# Patient Record
Sex: FEMALE | Race: WHITE | NOT HISPANIC OR LATINO | Employment: OTHER | URBAN - METROPOLITAN AREA
[De-identification: names, ages, dates, MRNs, and addresses within clinical notes are randomized per-mention and may not be internally consistent; named-entity substitution may affect disease eponyms.]

---

## 2017-08-08 ENCOUNTER — CARE COORDINATION (OUTPATIENT)
Dept: GASTROENTEROLOGY | Facility: CLINIC | Age: 47
End: 2017-08-08

## 2017-08-08 DIAGNOSIS — K63.8219 SMALL INTESTINAL BACTERIAL OVERGROWTH: Primary | ICD-10-CM

## 2017-08-08 RX ORDER — NEOMYCIN SULFATE 500 MG/1
500 TABLET ORAL 2 TIMES DAILY
Qty: 28 TABLET | Refills: 0 | Status: SHIPPED | OUTPATIENT
Start: 2017-08-08 | End: 2017-08-22

## 2017-08-15 ENCOUNTER — TELEPHONE (OUTPATIENT)
Dept: GASTROENTEROLOGY | Facility: CLINIC | Age: 47
End: 2017-08-15

## 2017-08-15 NOTE — TELEPHONE ENCOUNTER
Prior Authorization Specialty Medication Request    Medication/Dose: xifaxan 550mg TID for 14 days.  Diagnosis and ICD: SIBO   New/Renewal/Insurance Change PA: New     Important Lab Values:     Previously Tried and Failed Therapies:  Diagnosed with SIBO on elevated hydrogen and methane breath test.  She did not respond to Cipro and Flagyl, but had improvement on rifaximin twice.     Rationale:     Would you like to include any research articles?    If yes please include the hyperlink(s) below or fax @ [phone].    (Include Name and MRN)    If you received a fax notification from an outside Pharmacy;

## 2017-08-15 NOTE — TELEPHONE ENCOUNTER
PA Initiation    Medication: xifaxan 550mg TID for 14 days.  Insurance Company: [employer] - Phone [phone] Fax [phone]  Pharmacy Filling the Rx: [facility] # [id number] - [facility] [address] [place] [address]  Filling Pharmacy Phone: [phone]  Filling Pharmacy Fax: [phone]  Start Date: 8/8/2017

## 2017-08-18 NOTE — TELEPHONE ENCOUNTER
Prior Authorization Approval    Authorization Effective Date: 8/15/2017  Authorization Expiration Date: 8/29/2017  Medication: xifaxan 550mg-APPROVED  Approved Dose/Quantity:    Reference #: [id number]   Insurance Company: [employer] - Phone [phone] Fax [phone]  Expected CoPay: $455     CoPay Card Available:      Foundation Assistance Needed:    Which Pharmacy is filling the prescription (Not needed for infusion/clinic administered): [facility] # [id number] - [place] [address] [facility]  Pharmacy Notified: Yes  Patient Notified: Yes    PATIENT IS AWARE OF HIGH COPAY.  UNABLE TO ADJUST COPAY DUE TO MEMBER'S INSURANCE PLAN.

## 2017-10-18 ENCOUNTER — RECORDS - HEALTHEAST (OUTPATIENT)
Dept: ADMINISTRATIVE | Facility: OTHER | Age: 47
End: 2017-10-18

## 2017-10-22 ENCOUNTER — HEALTH MAINTENANCE LETTER (OUTPATIENT)
Age: 47
End: 2017-10-22

## 2017-10-30 ENCOUNTER — OFFICE VISIT - HEALTHEAST (OUTPATIENT)
Dept: FAMILY MEDICINE | Facility: CLINIC | Age: 47
End: 2017-10-30

## 2017-10-30 DIAGNOSIS — Z13.228 ENCOUNTER FOR SCREENING FOR OTHER METABOLIC DISORDERS: ICD-10-CM

## 2017-10-30 DIAGNOSIS — M79.7 FIBROMYALGIA: ICD-10-CM

## 2017-10-30 DIAGNOSIS — K51.20 ULCERATIVE (CHRONIC) PROCTITIS (H): ICD-10-CM

## 2017-10-30 DIAGNOSIS — Z00.00 ROUTINE GENERAL MEDICAL EXAMINATION AT A HEALTH CARE FACILITY: ICD-10-CM

## 2017-10-30 DIAGNOSIS — E55.9 VITAMIN D DEFICIENCY: ICD-10-CM

## 2017-10-30 DIAGNOSIS — Z12.31 VISIT FOR SCREENING MAMMOGRAM: ICD-10-CM

## 2017-10-30 DIAGNOSIS — E03.9 HYPOTHYROIDISM: ICD-10-CM

## 2017-10-30 DIAGNOSIS — G93.32 CHRONIC FATIGUE SYNDROME: ICD-10-CM

## 2017-10-30 LAB
CHOLEST SERPL-MCNC: 208 MG/DL
FASTING STATUS PATIENT QL REPORTED: YES
HDLC SERPL-MCNC: 65 MG/DL
LDLC SERPL CALC-MCNC: 133 MG/DL
TRIGL SERPL-MCNC: 48 MG/DL

## 2017-11-09 ENCOUNTER — COMMUNICATION - HEALTHEAST (OUTPATIENT)
Dept: FAMILY MEDICINE | Facility: CLINIC | Age: 47
End: 2017-11-09

## 2017-11-09 ASSESSMENT — ENCOUNTER SYMPTOMS
STIFFNESS: 0
DIZZINESS: 0
BLOOD IN STOOL: 0
WEAKNESS: 0
FEVER: 0
ABDOMINAL PAIN: 1
MUSCLE WEAKNESS: 1
POLYPHAGIA: 0
BRUISES/BLEEDS EASILY: 1
DECREASED APPETITE: 0
CONSTIPATION: 1
NIGHT SWEATS: 1
ALTERED TEMPERATURE REGULATION: 1
PANIC: 0
DIARRHEA: 0
HEADACHES: 0
BOWEL INCONTINENCE: 0
CHILLS: 0
NAUSEA: 0
JOINT SWELLING: 0
FATIGUE: 1
PARALYSIS: 0
INSOMNIA: 0
MEMORY LOSS: 0
SPEECH CHANGE: 0
INCREASED ENERGY: 0
ARTHRALGIAS: 0
WEIGHT LOSS: 0
BACK PAIN: 0
SWOLLEN GLANDS: 0
NUMBNESS: 0
TINGLING: 0
SEIZURES: 0
NECK PAIN: 1
DECREASED CONCENTRATION: 1
NERVOUS/ANXIOUS: 0
RECTAL PAIN: 0
JAUNDICE: 0
HEARTBURN: 0
LOSS OF CONSCIOUSNESS: 0
BLOATING: 1
MUSCLE CRAMPS: 0
VOMITING: 0
POLYDIPSIA: 0
DISTURBANCES IN COORDINATION: 0
DEPRESSION: 1
HALLUCINATIONS: 0
MYALGIAS: 1
WEIGHT GAIN: 1

## 2017-11-13 ENCOUNTER — TELEPHONE (OUTPATIENT)
Dept: GASTROENTEROLOGY | Facility: CLINIC | Age: 47
End: 2017-11-13

## 2017-11-14 ENCOUNTER — OFFICE VISIT (OUTPATIENT)
Dept: GASTROENTEROLOGY | Facility: CLINIC | Age: 47
End: 2017-11-14

## 2017-11-14 ENCOUNTER — RECORDS - HEALTHEAST (OUTPATIENT)
Dept: ADMINISTRATIVE | Facility: OTHER | Age: 47
End: 2017-11-14

## 2017-11-14 VITALS
HEART RATE: 74 BPM | TEMPERATURE: 97.9 F | OXYGEN SATURATION: 100 % | SYSTOLIC BLOOD PRESSURE: 110 MMHG | HEIGHT: 70 IN | DIASTOLIC BLOOD PRESSURE: 74 MMHG | BODY MASS INDEX: 22.33 KG/M2 | WEIGHT: 156 LBS

## 2017-11-14 DIAGNOSIS — K59.00 CONSTIPATION, UNSPECIFIED CONSTIPATION TYPE: ICD-10-CM

## 2017-11-14 DIAGNOSIS — M62.89 PELVIC FLOOR DYSFUNCTION: ICD-10-CM

## 2017-11-14 DIAGNOSIS — K63.8219 SMALL INTESTINAL BACTERIAL OVERGROWTH: Primary | ICD-10-CM

## 2017-11-14 DIAGNOSIS — K51.20 ULCERATIVE PROCTITIS WITHOUT COMPLICATION (H): ICD-10-CM

## 2017-11-14 ASSESSMENT — PAIN SCALES - GENERAL: PAINLEVEL: NO PAIN (0)

## 2017-11-14 NOTE — NURSING NOTE
Printed after visit summary given to along with verbal instructions.  Called pelvic floor and left a message requesting pt be called for an appt for biofeedback.  Follow up appt given.  Hydrogen Breath test scheduled.

## 2017-11-14 NOTE — NURSING NOTE
"Chief Complaint   Patient presents with     RECHECK     F/U       Vitals:    11/14/17 0915   BP: 110/74   BP Location: Left arm   Patient Position: Chair   Cuff Size: Adult Regular   Pulse: 74   Temp: 97.9  F (36.6  C)   SpO2: 100%   Weight: 156 lb   Height: 5' 10\"       Body mass index is 22.38 kg/(m^2).      Charlene Collazo                          "

## 2017-11-14 NOTE — MR AVS SNAPSHOT
After Visit Summary   11/14/2017    Doris Fritz    MRN: [id number]           Patient Information     Date Of Birth          1970        Visit Information        Provider Department      11/14/2017 9:20 AM Denzel Hall MD [facility]        Today's Diagnoses     Small intestinal bacterial overgrowth    -  1    Ulcerative proctitis without complication (H)        Constipation, unspecified constipation type        Pelvic floor dysfunction          Care Instructions    Good to see you again!    Continue the canasa suppositories as needed    Referral to the pelvic floor center for biofeedback/physicial therapy for your known pelvic floor center    Repeat the hydrogen breath test  You are scheduled December 22  Check in time  845   [facility]   Endoscopy Unit [address]   [address]  You will be mailed the instructions   [phone]     Follow up with your new naturopath and nutritionist    Exercise at least 5 times for 30 min per time    Keep hydrated    Follow up in 6 month or sooner if needed         Thanks Kaci Painting RN Care Coordinator for Dr. Hall   Phone   [phone]      Call with questions  For questions regarding your care Monday through Friday, contact the RN GI care coordinator,  Call   [phone] option 3 . Your call will be  returned same day, or if consultation is needed with the provider, it may be following business day - or you may send a My Chart message.    For medication refills (prescribed by the GI clinic), contact your pharmacy.    For appointment rescheduling/cancellation, contact [phone]     After hours, or if you have an immediate GI concern and cannot wait for a return call, contact the GI Fellow at [phone] and select option #4.              Follow-ups after your visit        Additional Services     GASTROENTEROLOGY ADULT REF PROCEDURE ONLY       Last Lab Result: Creatinine (mg/dL)       Date                      Value                 02/08/2016               0.85             ----------  Body mass index is 22.38 kg/(m^2).     Needed:  No  Language:  English    Patient will be contacted to schedule procedure.     Please be aware that coverage of these services is subject to the terms and limitations of your health insurance plan.  Call member services at your health plan with any benefit or coverage questions.  Any procedures must be performed at a [place] facility OR coordinated by your clinic's referral office.    Please bring the following with you to your appointment:    (1) Any X-Rays, CTs or MRIs which have been performed.  Contact the facility where they were done to arrange for  prior to your scheduled appointment.    (2) List of current medications   (3) This referral request   (4) Any documents/labs given to you for this referral                  Your next 10 appointments already scheduled     Dec 22, 2017   Procedure with Karen Chan MD   [facility], [place], Endoscopy ([facility], [facility])    [address] [id number] [phone]           The [facility] is located on the corner of [address] and [address] on the [facility]. It is easily accessible from virtually any point in the [facility] [place] [place] [facility] area, via [address] and [address].            Apr 10, 2018  9:20 AM CDT   (Arrive by 9:05 AM)   RETURN INFLAMMATORY BOWEL DISEASE with Denzel Hall MD   [facility] ([facility])    [address]  [address] [facility] [id number] [phone]              Future tests that were ordered for you today     Open Future Orders        Priority Expected Expires Ordered    BREATH HYDROGEN TEST Routine  12/29/2017 11/14/2017            Who to contact     Please call your clinic at [phone] to:    Ask questions about your  "health    Make or cancel appointments    Discuss your medicines    Learn about your test results    Speak to your doctor   If you have compliments or concerns about an experience at your clinic, or if you wish to file a complaint, please contact [facility] Patient Relations at [phone] or email us at [email or web address]         Additional Information About Your Visit        [email or web address] Information     [employer] gives you secure access to your electronic health record. If you see a primary care provider, you can also send messages to your care team and make appointments. If you have questions, please call your primary care clinic.  If you do not have a primary care provider, please call [phone] and they will assist you.      [employer] is an electronic gateway that provides easy, online access to your medical records. With [employer], you can request a clinic appointment, read your test results, renew a prescription or communicate with your care team.     To access your existing account, please contact your [facility] or call [phone] for assistance.        Care EveryWhere ID     This is your Care EveryWhere ID. This could be used by other organizations to access your [facility] medical records  [id number]        Your Vitals Were     Pulse Temperature Height Pulse Oximetry BMI (Body Mass Index)       74 97.9  F (36.6  C) 1.778 m (5' 10\") 100% 22.38 kg/m2        Blood Pressure from Last 3 Encounters:   11/14/17 110/74   05/16/16 115/62   02/08/16 126/80    Weight from Last 3 Encounters:   11/14/17 70.8 kg (156 lb)   05/16/16 71 kg (156 lb 8 oz)   02/08/16 71.7 kg (158 lb 1.6 oz)              We Performed the Following     CBC with platelets differential     Comprehensive metabolic panel     CRP inflammation     Erythrocyte sedimentation rate auto     GASTROENTEROLOGY ADULT REF PROCEDURE ONLY          Today's Medication Changes          These " changes are accurate as of: 11/14/17 10:19 AM.  If you have any questions, ask your nurse or doctor.               Stop taking these medicines if you haven't already. Please contact your care team if you have questions.     5-HTP 50 MG Caps           ALPHA LIPOIC ACID PO           B COMPLEX PO           CO Q 10 PO           GARLIC PO           IMMUNOPRO RX Powd           L-Glutamic Acid Powd           MAGNESIUM CITRATE PO           OMEGA-3 FISH OIL PO           OREGANO PO           PROBIOTIC DAILY PO           PROBIOTIC MULTI-ENZYME Tabs           UNABLE TO FIND           VITAMIN D-3 PO                    Primary Care Provider Office Phone # Fax #    Kathleen Salazar [phone]       [place] [address]        Equal Access to Services     MARCY CLOUD : Hadii joe Hawk, waaxda sharon, qaybta kaalmada kia, abhishek jerez . So [facility] [phone].    ATENCIÓN: Si habla español, tiene a aguilera disposición servicios gratuitos de asistencia lingüística. Llame al [phone].    We comply with applicable federal civil rights laws and [place] laws. We do not discriminate on the basis of race, color, national origin, age, disability, sex, sexual orientation, or gender identity.            Thank you!     Thank you for choosing [facility]  for your care. Our goal is always to provide you with excellent care. Hearing back from our patients is one way we can continue to improve our services. Please take a few minutes to complete the written survey that you may receive in the mail after your visit with us. Thank you!             Your Updated Medication List - Protect others around you: Learn how to safely use, store and throw away your medicines at www.disposemymeds.org.          This list is accurate as of: 11/14/17 10:19 AM.  Always use your most recent med list.                   Brand Name  Dispense Instructions for use Diagnosis    Enema Bottle Misc     1 Device    Place 1 Device rectally as needed 1 - 2 tap water enemas morning of precedure    Ulcerative (chronic) proctitis, without complications (H)       HYDROcodone-acetaminophen 5-500 MG per tablet    VICODIN     Take 1-2 tablets by mouth every 6 hours as needed for moderate to severe pain        magnesium 250 MG tablet      Take 1 tablet by mouth daily        mesalamine 1000 MG Suppository    CANASA    90 suppository    Place 1 suppository (1,000 mg) rectally 2 times daily Place 1 suppository rectally  2 times a day for 2 weeks then 1 rectally at bedtime    Ulcerative colitis (H)       PROZAC PO      Take 20 mg by mouth daily        TRAZODONE HCL PO      Take 50 mg by mouth At Bedtime

## 2017-11-14 NOTE — LETTER
"11/14/2017       RE: Doris Fritz  [address]  [place] [address]     Dear Colleague,    Thank you for referring your patient, Doris Fritz, to the [facility] at [facility]. Please see a copy of my visit note below.    OUTPATIENT GI FOLLOWUP VISIT        PRIMARY CARE PHYSICIAN:  Dr. Kathleen Salazar.       CHIEF COMPLAINT:  Ulcerative proctitis and abdominal bloating/constipation.       ULCERATIVE COLITIS HISTORY:   1.  Age at diagnosis:  Mid 30s.  Diagnosed by Dr. Taylor in the early 2000s.   2.  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  Most recent flex sig at the end of 2015.  Biopsies of the descending, transverse and sigmoid colon only showed melanosis coli.  Rectal biopsies showed chronic active colitis.   3.  Previous ulcerative colitis surgeries:  None.   4.  Current ulcerative colitis medications:  None.     5.  Previous ulcerative colitis medications:  She has taken Canasa suppositories intermittently only.       HISTORY OF PRESENT ILLNESS:  Ms. Fritz is here for followup today.      She says she has had a rough year.    Has been taking the canasa supp intermittently (about quarterly) if she has any mucous. This controls that well.    Her biggest complaint is constipation. Takes magnesium and PRN enemas but still has issues. If she eats a liquid diet this is controlled but cannot expand her diet.    She has not follow up with the pelvic floor center.    She has not taken rifaximin for SIBO in quite some time. She does feel bloated but this is usually related to when she is constipated.    She has an appt with a new naturopath who she says treats SIBO \"naturally\" with diet.     She has not really tolerated miralax (she likes magnesium better) and does not tolerate fiber. She has tried various diets including low FODMAP and modified paleo in the past.      REVIEW OF SYSTEMS:  A " complete review of systems was performed.  Pertinent positives and negatives are as stated above in the HPI.  The remainder of a complete review of systems is unremarkable.       ADDITIONAL PAST MEDICAL HISTORY:   1.  Fibromyalgia.   2.  Chronic Lyme.  It is not clear to me how this was diagnosed.  She follows with an alternative medicine doctor.  She takes herbal antimicrobials for this.  I have cautioned her multiple times about taking herbal medications.   3.  Diagnosed with SIBO on elevated hydrogen and methane breath test.  She did not respond to Cipro and Flagyl, but had improvement on rifaximin twice.   4.  Endometriosis.  Reports this was diagnosed by laparoscopy in the 1990s.  She has not had any treatment for this.  She does have some worsening symptoms while menstruating; however, she has not had recent issues with this.  Please see my initial consult note for further details of this.   5.  History of depression.   6.  Chronic pain.  This is associated with her chronic fatigue and fibromyalgia.   7.  Cognitive impairment, details are unclear.   8.  Recurrent UTIs.  She previously was on recurrent antibiotics for this.       FAMILY HISTORY:  This was reviewed.  No history of IBD, liver disease or pancreas disease.  No family history of colon cancer or colon polyps.       SOCIAL HISTORY:   Reviewed. She is currently on disability.  She is not , but does have a fiancee.  She has never had any children.  She is waiting to get  until she figures out her GI symptoms.  She is not sexually active; she does have a low libido.  She does not smoke.  She does not use marijuana.  She formerly had a history of drinking too much alcohol, but now drinks only rarely.  No changes in symptoms with alcohol.       MEDICATIONS:   Current Outpatient Prescriptions   Medication     HYDROcodone-acetaminophen (VICODIN) 5-500 MG per tablet     FLUoxetine HCl (PROZAC PO)     mesalamine (CANASA) 1000 MG suppository      magnesium 250 MG tablet     TRAZODONE HCL PO     [employer] (ENEMA BOTTLE) MISC     No current facility-administered medications for this visit.          PHYSICAL EXAMINATION:   VITAL SIGNS:  156 pounds.  Blood pressure 115/62, pulse 81, temperature 98.2, BMI 22, satting 99% on room air.   GENERAL:  She is awake, alert and oriented x3, with no focal deficits.  Pleasant and interactive.   HEENT:  Head is atraumatic and normocephalic.  Sclerae are without injection.  Oropharynx is clear.   ABDOMEN:  Soft, nontender and nondistended, positive bowel sounds.       LABORATORY DATA:   Reviewed in EPIC from 2016      ASSESSMENT AND PLAN:   1.  Ulcerative proctitis.  This has been most recently seen on a flex sig at the end of 2015.   Controlled on intermittent canasa.  2.  Constipation.  I think this is a combination of her ulcerative proctitis, as well as her pelvic floor dysfunction.   UC proctitis is controlled. She has not been able to manage with enemas or magnesium. Can try miralax but she declines. I think she will benefit from pelvic floor biofeedback and PT. I think her constipation is driving much of her bloating and symptoms. I think she will benefit from this. If no improvement we can try Linzess or Amitiza but the patient prefers to avoid medications which is reasonable.   3.  IBS and small intestinal bacterial overgrowth.  Again I think much of her symptoms could be related to constipation. Manage as above. We will recheck hydrogen breath test. She will follow up with a naturopath and nutritionist.  4. Depression. We discussed this as well, including the its impact on symptoms. She will meet with Dr. Jernigan.  5.  Health care maintenance.  I did discuss this with the patient.  I do recommend a yearly flu shot; however, she does not like flu shots, but she will think about this.  I also recommend a pneumonia vaccine, and a 5-year booster if this has not been done.  She should be up-to-date on her health  maintenance vaccines, including Tdap.  She is okay to get live vaccines if needed.  She should take calcium and vitamin D on a daily basis.  She has never had steroids, so I do not think she needs a DEXA scan.  She should avoid NSAIDs and tobacco.  She does not need yearly skin exams, as she is not immunosuppressed.     Follow up in 6 months or sooner if needed      Thank you very much for the opportunity to take part in the care of this patient.  Please do not hesitate to call with questions.     Denzel Hall MD    [facility]  Division of Gastroenterology, Hepatology and Nutrition

## 2017-11-14 NOTE — PROGRESS NOTES
"OUTPATIENT GI FOLLOWUP VISIT        PRIMARY CARE PHYSICIAN:  Dr. Kathleen Salazar.       CHIEF COMPLAINT:  Ulcerative proctitis and abdominal bloating/constipation.       ULCERATIVE COLITIS HISTORY:   1.  Age at diagnosis:  Mid 30s.  Diagnosed by Dr. Taylor in the early 2000s.   2.  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  Most recent flex sig at the end of 2015.  Biopsies of the descending, transverse and sigmoid colon only showed melanosis coli.  Rectal biopsies showed chronic active colitis.   3.  Previous ulcerative colitis surgeries:  None.   4.  Current ulcerative colitis medications:  None.     5.  Previous ulcerative colitis medications:  She has taken Canasa suppositories intermittently only.       HISTORY OF PRESENT ILLNESS:  Ms. Fritz is here for followup today.      She says she has had a rough year.    Has been taking the canasa supp intermittently (about quarterly) if she has any mucous. This controls that well.    Her biggest complaint is constipation. Takes magnesium and PRN enemas but still has issues. If she eats a liquid diet this is controlled but cannot expand her diet.    She has not follow up with the pelvic floor center.    She has not taken rifaximin for SIBO in quite some time. She does feel bloated but this is usually related to when she is constipated.    She has an appt with a new naturopath who she says treats SIBO \"naturally\" with diet.     She has not really tolerated miralax (she likes magnesium better) and does not tolerate fiber. She has tried various diets including low FODMAP and modified paleo in the past.      REVIEW OF SYSTEMS:  A complete review of systems was performed.  Pertinent positives and negatives are as stated above in the HPI.  The remainder of a complete review of systems is unremarkable.       ADDITIONAL PAST MEDICAL HISTORY:   1.  Fibromyalgia.   2.  Chronic Lyme.  It is not clear to me how this was diagnosed.  She follows " with an alternative medicine doctor.  She takes herbal antimicrobials for this.  I have cautioned her multiple times about taking herbal medications.   3.  Diagnosed with SIBO on elevated hydrogen and methane breath test.  She did not respond to Cipro and Flagyl, but had improvement on rifaximin twice.   4.  Endometriosis.  Reports this was diagnosed by laparoscopy in the 1990s.  She has not had any treatment for this.  She does have some worsening symptoms while menstruating; however, she has not had recent issues with this.  Please see my initial consult note for further details of this.   5.  History of depression.   6.  Chronic pain.  This is associated with her chronic fatigue and fibromyalgia.   7.  Cognitive impairment, details are unclear.   8.  Recurrent UTIs.  She previously was on recurrent antibiotics for this.       FAMILY HISTORY:  This was reviewed.  No history of IBD, liver disease or pancreas disease.  No family history of colon cancer or colon polyps.       SOCIAL HISTORY:  Reviewed. She is currently on disability.  She is not , but does have a fiancee.  She has never had any children.  She is waiting to get  until she figures out her GI symptoms.  She is not sexually active; she does have a low libido.  She does not smoke.  She does not use marijuana.  She formerly had a history of drinking too much alcohol, but now drinks only rarely.  No changes in symptoms with alcohol.       MEDICATIONS:   Current Outpatient Prescriptions   Medication     HYDROcodone-acetaminophen (VICODIN) 5-500 MG per tablet     FLUoxetine HCl (PROZAC PO)     mesalamine (CANASA) 1000 MG suppository     magnesium 250 MG tablet     TRAZODONE HCL PO     [employer] (ENEMA BOTTLE) MISC     No current facility-administered medications for this visit.          PHYSICAL EXAMINATION:   VITAL SIGNS:  156 pounds.  Blood pressure 115/62, pulse 81, temperature 98.2, BMI 22, satting 99% on room air.   GENERAL:  She is  awake, alert and oriented x3, with no focal deficits.  Pleasant and interactive.   HEENT:  Head is atraumatic and normocephalic.  Sclerae are without injection.  Oropharynx is clear.   ABDOMEN:  Soft, nontender and nondistended, positive bowel sounds.       LABORATORY DATA:  Reviewed in EPIC from 2016      ASSESSMENT AND PLAN:   1.  Ulcerative proctitis.  This has been most recently seen on a flex sig at the end of 2015.  Controlled on intermittent canasa.  2.  Constipation.  I think this is a combination of her ulcerative proctitis, as well as her pelvic floor dysfunction.  UC proctitis is controlled. She has not been able to manage with enemas or magnesium. Can try miralax but she declines. I think she will benefit from pelvic floor biofeedback and PT. I think her constipation is driving much of her bloating and symptoms. I think she will benefit from this. If no improvement we can try Linzess or Amitiza but the patient prefers to avoid medications which is reasonable.   3.  IBS and small intestinal bacterial overgrowth.  Again I think much of her symptoms could be related to constipation. Manage as above. We will recheck hydrogen breath test. She will follow up with a naturopath and nutritionist.  4. Depression. We discussed this as well, including the its impact on symptoms. She will meet with Dr. Jernigan.  5.  Health care maintenance.  I did discuss this with the patient.  I do recommend a yearly flu shot; however, she does not like flu shots, but she will think about this.  I also recommend a pneumonia vaccine, and a 5-year booster if this has not been done.  She should be up-to-date on her health maintenance vaccines, including Tdap.  She is okay to get live vaccines if needed.  She should take calcium and vitamin D on a daily basis.  She has never had steroids, so I do not think she needs a DEXA scan.  She should avoid NSAIDs and tobacco.  She does not need yearly skin exams, as she is not immunosuppressed.      Follow up in 6 months or sooner if needed      Thank you very much for the opportunity to take part in the care of this patient.  Please do not hesitate to call with questions.     Denzel Hall MD    [facility]  Division of Gastroenterology, Hepatology and Nutrition    Answers for HPI/ROS submitted by the patient on 11/9/2017   General Symptoms: Yes  Skin Symptoms: No  HENT Symptoms: No  EYE SYMPTOMS: No  HEART SYMPTOMS: No  LUNG SYMPTOMS: No  INTESTINAL SYMPTOMS: Yes  URINARY SYMPTOMS: No  GYNECOLOGIC SYMPTOMS: No  BREAST SYMPTOMS: No  SKELETAL SYMPTOMS: Yes  BLOOD SYMPTOMS: Yes  NERVOUS SYSTEM SYMPTOMS: Yes  MENTAL HEALTH SYMPTOMS: Yes  Fever: No  Loss of appetite: No  Weight loss: No  Weight gain: Yes  Fatigue: Yes  Night sweats: Yes  Chills: No  Increased stress: No  Excessive hunger: No  Excessive thirst: No  Feeling hot or cold when others believe the temperature is normal: Yes  Loss of height: No  Post-operative complications: No  Surgical site pain: No  Hallucinations: No  Change in or Loss of Energy: No  Hyperactivity: No  Confusion: No  Heart burn or indigestion: No  Nausea: No  Vomiting: No  Abdominal pain: Yes  Bloating: Yes  Constipation: Yes  Diarrhea: No  Blood in stool: No  Black stools: No  Rectal or Anal pain: No  Fecal incontinence: No  Yellowing of skin or eyes: No  Vomit with blood: No  Change in stools: No  Back pain: No  Muscle aches: Yes  Neck pain: Yes  Swollen joints: No  Joint pain: No  Bone pain: No  Muscle cramps: No  Muscle weakness: Yes  Joint stiffness: No  Bone fracture: No  Anemia: No  Swollen glands: No  Easy bleeding or bruising: Yes  Edema or swelling: No  Trouble with coordination: No  Dizziness or trouble with balance: No  Fainting or black-out spells: No  Memory loss: No  Headache: No  Seizures: No  Speech problems: No  Tingling: No  Weakness: No  Difficulty walking: No  Paralysis: No  Numbness: No  Nervous or Anxious: No  Depression:  Yes  Trouble sleeping: No  Trouble thinking or concentrating: Yes  Mood changes: No  Panic attacks: No

## 2017-11-14 NOTE — PATIENT INSTRUCTIONS
Good to see you again!    Continue the canasa suppositories as needed    Referral to the pelvic floor center for biofeedback/physicial therapy for your known pelvic floor center    Repeat the hydrogen breath test  You are scheduled December 22  Check in time  845   [facility]   Endoscopy Unit [facility]   [address]  You will be mailed the instructions   [phone]     Follow up with your new naturopath and nutritionist    Exercise at least 5 times for 30 min per time    Keep hydrated    Follow up in 6 month or sooner if needed         Thanks Kaci Painting RN Care Coordinator for Dr. Hall   Phone   [phone]      Call with questions  For questions regarding your care Monday through Friday, contact the RN GI care coordinator,  Call   [phone] option 3 . Your call will be  returned same day, or if consultation is needed with the provider, it may be following business day - or you may send a My Chart message.    For medication refills (prescribed by the GI clinic), contact your pharmacy.    For appointment rescheduling/cancellation, contact [phone]     After hours, or if you have an immediate GI concern and cannot wait for a return call, contact the GI Fellow at [phone] and select option #4.

## 2017-11-22 ENCOUNTER — CARE COORDINATION (OUTPATIENT)
Dept: GASTROENTEROLOGY | Facility: CLINIC | Age: 47
End: 2017-11-22

## 2017-11-22 DIAGNOSIS — K51.90 ULCERATIVE COLITIS (H): ICD-10-CM

## 2017-11-22 RX ORDER — MESALAMINE 1000 MG/1
SUPPOSITORY RECTAL
Qty: 90 SUPPOSITORY | Refills: 3 | Status: SHIPPED | OUTPATIENT
Start: 2017-11-22 | End: 2021-11-01

## 2017-12-22 ENCOUNTER — HOSPITAL ENCOUNTER (OUTPATIENT)
Facility: CLINIC | Age: 47
Discharge: HOME | End: 2017-12-22
Attending: INTERNAL MEDICINE | Admitting: INTERNAL MEDICINE
Payer: MEDICARE

## 2017-12-22 ENCOUNTER — SURGERY (OUTPATIENT)
Age: 47
End: 2017-12-22

## 2017-12-22 VITALS — HEIGHT: 70 IN | BODY MASS INDEX: 22.62 KG/M2 | WEIGHT: 158 LBS

## 2017-12-22 PROCEDURE — 91065 BREATH HYDROGEN/METHANE TEST: CPT | Performed by: INTERNAL MEDICINE

## 2017-12-22 NOTE — IP AVS SNAPSHOT
[facility], [place], Endoscopy    [address] [place] [address]    Phone:  [phone]                                       After Visit Summary   12/22/2017    Doris Fritz    MRN: [id number]           After Visit Summary Signature Page     I have received my discharge instructions, and my questions have been answered. I have discussed any challenges I see with this plan with the nurse or doctor.    ..........................................................................................................................................  Patient/Patient Representative Signature      ..........................................................................................................................................  Patient Representative Print Name and Relationship to Patient    ..................................................               ................................................  Date                                            Time    ..........................................................................................................................................  Reviewed by Signature/Title    ...................................................              ..............................................  Date                                                            Time

## 2017-12-22 NOTE — IP AVS SNAPSHOT
MRN:[id number]                      After Visit Summary   12/22/2017    Doris Fritz    MRN: [id number]           Thank you!     Thank you for choosing [facility] for your care. Our goal is always to provide you with excellent care. Hearing back from our patients is one way we can continue to improve our services. Please take a few minutes to complete the written survey that you may receive in the mail after you visit with us. Thank you!        Patient Information     Date Of Birth          1970        About your hospital stay     You were admitted on:  December 22, 2017 You last received care in the:  [facility], Endoscopy    You were discharged on:  December 22, 2017       Who to Call     For medical emergencies, please call [phone].  For non-urgent questions about your medical care, please call your primary care provider or clinic, [phone]  For questions related to your surgery, please call your surgery clinic        Attending Provider     Provider Karen Reynolds MD Internal Medicine       Primary Care Provider Office Phone # Fax #    Kathleen Salazar [phone]      Your next 10 appointments already scheduled     Apr 10, 2018  9:20 AM CDT   (Arrive by 9:05 AM)   RETURN INFLAMMATORY BOWEL DISEASE with Denzel Hall MD   [facility] ([facility] [place])    [address] [place] [address] [phone]              Further instructions from your care team       Hydrogen Breath Test Discharge Instructions    1. You may experience diarrhea for the next 12-24 hours.  2. Resume a regular diet.  3. Follow-up with your referring doctor in clinic.  4. If you have questions call [phone] from 7:00am-4:30pm     Radha Blanchard RN    Pending Results     No orders found from 12/20/2017 to 12/23/2017.            Admission Information     Date & Time Provider  "Department Dept. Phone    12/22/2017 Karen Chan MD [facility], [place], Endoscopy [phone]      Your Vitals Were     Height Weight BMI (Body Mass Index)             1.778 m (5' 10\") 71.7 kg (158 lb) 22.67 kg/m2         MyChart Information     [employer] gives you secure access to your electronic health record. If you see a primary care provider, you can also send messages to your care team and make appointments. If you have questions, please call your primary care clinic.  If you do not have a primary care provider, please call [phone] and they will assist you.        Care EveryWhere ID     This is your Care EveryWhere ID. This could be used by other organizations to access your [place] medical records  [id number]        Equal Access to Services     MARCY CLOUD : Danna Hawk, francesca herrera, yariel adam, abhishek simon. So [facility] [phone].    ATENCIÓN: Si habla español, tiene a aguilera disposición servicios gratuitos de asistencia lingüística. Marni al [phone].    We comply with applicable federal civil rights laws and [place] laws. We do not discriminate on the basis of race, color, national origin, age, disability, sex, sexual orientation, or gender identity.               Review of your medicines      UNREVIEWED medicines. Ask your doctor about these medicines        Dose / Directions    HYDROcodone-acetaminophen 5-500 MG per tablet   Commonly known as:  VICODIN        Dose:  1-2 tablet   Take 1-2 tablets by mouth every 6 hours as needed for moderate to severe pain   Refills:  0       magnesium 250 MG tablet   Indication:  take 1-2 tabs nightly        Dose:  1 tablet   Take 1 tablet by mouth daily   Refills:  0       mesalamine 1000 MG Suppository   Commonly known as:  CANASA   Used for:  Ulcerative colitis (H)        Place 1 suppository rectally  at bedtime as needed.   Quantity:  90 suppository   Refills:  3       PROZAC PO "        Dose:  20 mg   Take 20 mg by mouth daily   Refills:  0       TRAZODONE HCL PO   Indication:  take 1-2 tabs as needed        Dose:  50 mg   Take 50 mg by mouth At Bedtime   Refills:  0         CONTINUE these medicines which have NOT CHANGED        Dose / Directions    Enema Bottle Misc   Used for:  Ulcerative (chronic) proctitis, without complications (H)        Dose:  1 Device   Place 1 Device rectally as needed 1 - 2 tap water enemas morning of precedure   Quantity:  1 Device   Refills:  0                Protect others around you: Learn how to safely use, store and throw away your medicines at www.disposemymeds.org.             Medication List: This is a list of all your medications and when to take them. Check marks below indicate your daily home schedule. Keep this list as a reference.      Medications           Morning Afternoon Evening Bedtime As Needed    Enema Bottle Misc   Place 1 Device rectally as needed 1 - 2 tap water enemas morning of precedure                                HYDROcodone-acetaminophen 5-500 MG per tablet   Commonly known as:  VICODIN   Take 1-2 tablets by mouth every 6 hours as needed for moderate to severe pain                                magnesium 250 MG tablet   Take 1 tablet by mouth daily                                mesalamine 1000 MG Suppository   Commonly known as:  CANASA   Place 1 suppository rectally  at bedtime as needed.                                PROZAC PO   Take 20 mg by mouth daily                                TRAZODONE HCL PO   Take 50 mg by mouth At Bedtime

## 2017-12-22 NOTE — OR NURSING
Pt here for HBT. Procedure explained and consent given. Pt tolerated test with complaints of bloating increasing during test. Baseline breath obtained prior to pt drinking 25 gms dxylose. Hydrogen level from 1 ppm at baseline, 2 at 60 min, 3 at 120 min, 2 at 180 min. Methane levels remain 0 ppm throughout test. DC instructions given. Pt will follow up with referring provider for test results.

## 2017-12-22 NOTE — DISCHARGE INSTRUCTIONS
Hydrogen Breath Test Discharge Instructions    1. You may experience diarrhea for the next 12-24 hours.  2. Resume a regular diet.  3. Follow-up with your referring doctor in clinic.  4. If you have questions call [phone] from 7:00am-4:30pm     Radha Blanchard RN

## 2017-12-26 ENCOUNTER — OFFICE VISIT - HEALTHEAST (OUTPATIENT)
Dept: FAMILY MEDICINE | Facility: CLINIC | Age: 47
End: 2017-12-26

## 2017-12-26 ENCOUNTER — COMMUNICATION - HEALTHEAST (OUTPATIENT)
Dept: FAMILY MEDICINE | Facility: CLINIC | Age: 47
End: 2017-12-26

## 2017-12-26 DIAGNOSIS — J34.89 NASAL CRUSTING: ICD-10-CM

## 2017-12-26 DIAGNOSIS — R23.2 HOT FLASHES: ICD-10-CM

## 2017-12-26 DIAGNOSIS — B36.9 FUNGAL DERMATITIS: ICD-10-CM

## 2018-01-15 ENCOUNTER — OFFICE VISIT - HEALTHEAST (OUTPATIENT)
Dept: FAMILY MEDICINE | Facility: CLINIC | Age: 48
End: 2018-01-15

## 2018-01-15 DIAGNOSIS — R53.83 FATIGUE: ICD-10-CM

## 2018-01-15 LAB
ALBUMIN SERPL-MCNC: 3.6 G/DL (ref 3.5–5)
ALP SERPL-CCNC: 57 U/L (ref 45–120)
ALT SERPL W P-5'-P-CCNC: 16 U/L (ref 0–45)
ANION GAP SERPL CALCULATED.3IONS-SCNC: 11 MMOL/L (ref 5–18)
AST SERPL W P-5'-P-CCNC: 18 U/L (ref 0–40)
BILIRUB SERPL-MCNC: 0.7 MG/DL (ref 0–1)
BUN SERPL-MCNC: 9 MG/DL (ref 8–22)
C REACTIVE PROTEIN LHE: <0.1 MG/DL (ref 0–0.8)
CALCIUM SERPL-MCNC: 9.1 MG/DL (ref 8.5–10.5)
CHLORIDE BLD-SCNC: 104 MMOL/L (ref 98–107)
CO2 SERPL-SCNC: 26 MMOL/L (ref 22–31)
CREAT SERPL-MCNC: 0.75 MG/DL (ref 0.6–1.1)
ERYTHROCYTE [DISTWIDTH] IN BLOOD BY AUTOMATED COUNT: 12.7 % (ref 11–14.5)
ERYTHROCYTE [SEDIMENTATION RATE] IN BLOOD BY WESTERGREN METHOD: 4 MM/HR (ref 0–20)
FERRITIN SERPL-MCNC: 24 NG/ML (ref 10–130)
FOLATE SERPL-MCNC: 16.2 NG/ML
GFR SERPL CREATININE-BSD FRML MDRD: >60 ML/MIN/1.73M2
GLUCOSE BLD-MCNC: 88 MG/DL (ref 70–125)
HCT VFR BLD AUTO: 42.1 % (ref 35–47)
HGB BLD-MCNC: 13.8 G/DL (ref 12–16)
MCH RBC QN AUTO: 30.2 PG (ref 27–34)
MCHC RBC AUTO-ENTMCNC: 32.7 G/DL (ref 32–36)
MCV RBC AUTO: 92 FL (ref 80–100)
MONOCYTES NFR BLD AUTO: NEGATIVE %
PLATELET # BLD AUTO: 222 THOU/UL (ref 140–440)
PMV BLD AUTO: 6.9 FL (ref 7–10)
POTASSIUM BLD-SCNC: 4.4 MMOL/L (ref 3.5–5)
PROT SERPL-MCNC: 6.4 G/DL (ref 6–8)
RBC # BLD AUTO: 4.56 MILL/UL (ref 3.8–5.4)
SODIUM SERPL-SCNC: 141 MMOL/L (ref 136–145)
T3FREE SERPL-MCNC: 2.4 PG/ML (ref 1.9–3.9)
T4 FREE SERPL-MCNC: 1 NG/DL (ref 0.7–1.8)
TSH SERPL DL<=0.005 MIU/L-ACNC: 2.3 UIU/ML (ref 0.3–5)
VIT B12 SERPL-MCNC: 982 PG/ML (ref 213–816)
WBC: 5 THOU/UL (ref 4–11)

## 2018-02-13 ENCOUNTER — OFFICE VISIT - HEALTHEAST (OUTPATIENT)
Dept: FAMILY MEDICINE | Facility: CLINIC | Age: 48
End: 2018-02-13

## 2018-02-13 DIAGNOSIS — G93.32 CHRONIC FATIGUE SYNDROME: ICD-10-CM

## 2018-02-13 DIAGNOSIS — E03.8 SUBCLINICAL HYPOTHYROIDISM: ICD-10-CM

## 2018-02-13 ASSESSMENT — MIFFLIN-ST. JEOR: SCORE: 1423.95

## 2018-05-30 ENCOUNTER — COMMUNICATION - HEALTHEAST (OUTPATIENT)
Dept: FAMILY MEDICINE | Facility: CLINIC | Age: 48
End: 2018-05-30

## 2018-05-30 DIAGNOSIS — E03.8 SUBCLINICAL HYPOTHYROIDISM: ICD-10-CM

## 2018-05-30 DIAGNOSIS — G47.00 INSOMNIA: ICD-10-CM

## 2018-05-30 DIAGNOSIS — F32.4 MAJOR DEPRESSION IN PARTIAL REMISSION (H): ICD-10-CM

## 2018-06-05 ASSESSMENT — ENCOUNTER SYMPTOMS
FATIGUE: 1
HALLUCINATIONS: 0
DEPRESSION: 1
DECREASED LIBIDO: 1
NIGHT SWEATS: 1
DECREASED APPETITE: 0
INCREASED ENERGY: 0
POLYDIPSIA: 0
WEIGHT LOSS: 0
FEVER: 0
NERVOUS/ANXIOUS: 1
DECREASED CONCENTRATION: 1
CHILLS: 0
POLYPHAGIA: 0
ALTERED TEMPERATURE REGULATION: 1
PANIC: 0
WEIGHT GAIN: 1
INSOMNIA: 0
HOT FLASHES: 0

## 2018-06-18 ENCOUNTER — RECORDS - HEALTHEAST (OUTPATIENT)
Dept: ADMINISTRATIVE | Facility: OTHER | Age: 48
End: 2018-06-18

## 2018-06-18 LAB — MAMMOGRAM: NORMAL

## 2018-06-19 ENCOUNTER — RECORDS - HEALTHEAST (OUTPATIENT)
Dept: ADMINISTRATIVE | Facility: OTHER | Age: 48
End: 2018-06-19

## 2018-06-19 ENCOUNTER — OFFICE VISIT (OUTPATIENT)
Dept: GASTROENTEROLOGY | Facility: CLINIC | Age: 48
End: 2018-06-19
Payer: COMMERCIAL

## 2018-06-19 VITALS
BODY MASS INDEX: 22.04 KG/M2 | DIASTOLIC BLOOD PRESSURE: 73 MMHG | HEART RATE: 89 BPM | WEIGHT: 153.6 LBS | OXYGEN SATURATION: 100 % | SYSTOLIC BLOOD PRESSURE: 117 MMHG | TEMPERATURE: 98.2 F

## 2018-06-19 DIAGNOSIS — K51.20 ULCERATIVE PROCTITIS WITHOUT COMPLICATION (H): Primary | ICD-10-CM

## 2018-06-19 DIAGNOSIS — K51.20 ULCERATIVE PROCTITIS WITHOUT COMPLICATION (H): ICD-10-CM

## 2018-06-19 LAB
ALBUMIN SERPL-MCNC: 3.9 G/DL (ref 3.4–5)
ALP SERPL-CCNC: 72 U/L (ref 40–150)
ALT SERPL W P-5'-P-CCNC: 27 U/L (ref 0–50)
ANION GAP SERPL CALCULATED.3IONS-SCNC: 10 MMOL/L (ref 3–14)
AST SERPL W P-5'-P-CCNC: 19 U/L (ref 0–45)
BASOPHILS # BLD AUTO: 0.1 10E9/L (ref 0–0.2)
BASOPHILS NFR BLD AUTO: 1.1 %
BILIRUB SERPL-MCNC: 0.5 MG/DL (ref 0.2–1.3)
BUN SERPL-MCNC: 9 MG/DL (ref 7–30)
CALCIUM SERPL-MCNC: 8.2 MG/DL (ref 8.5–10.1)
CHLORIDE SERPL-SCNC: 105 MMOL/L (ref 94–109)
CO2 SERPL-SCNC: 25 MMOL/L (ref 20–32)
CREAT SERPL-MCNC: 0.81 MG/DL (ref 0.52–1.04)
CRP SERPL-MCNC: <2.9 MG/L (ref 0–8)
DIFFERENTIAL METHOD BLD: NORMAL
EOSINOPHIL # BLD AUTO: 0.4 10E9/L (ref 0–0.7)
EOSINOPHIL NFR BLD AUTO: 9 %
ERYTHROCYTE [DISTWIDTH] IN BLOOD BY AUTOMATED COUNT: 12.8 % (ref 10–15)
ERYTHROCYTE [SEDIMENTATION RATE] IN BLOOD BY WESTERGREN METHOD: 4 MM/H (ref 0–20)
GFR SERPL CREATININE-BSD FRML MDRD: 75 ML/MIN/1.7M2
GLUCOSE SERPL-MCNC: 88 MG/DL (ref 70–99)
HCT VFR BLD AUTO: 43.5 % (ref 35–47)
HGB BLD-MCNC: 14.7 G/DL (ref 11.7–15.7)
IMM GRANULOCYTES # BLD: 0 10E9/L (ref 0–0.4)
IMM GRANULOCYTES NFR BLD: 0.2 %
LYMPHOCYTES # BLD AUTO: 1.6 10E9/L (ref 0.8–5.3)
LYMPHOCYTES NFR BLD AUTO: 34 %
MCH RBC QN AUTO: 30.9 PG (ref 26.5–33)
MCHC RBC AUTO-ENTMCNC: 33.8 G/DL (ref 31.5–36.5)
MCV RBC AUTO: 92 FL (ref 78–100)
MONOCYTES # BLD AUTO: 0.5 10E9/L (ref 0–1.3)
MONOCYTES NFR BLD AUTO: 11.2 %
NEUTROPHILS # BLD AUTO: 2 10E9/L (ref 1.6–8.3)
NEUTROPHILS NFR BLD AUTO: 44.5 %
NRBC # BLD AUTO: 0 10*3/UL
NRBC BLD AUTO-RTO: 0 /100
PLATELET # BLD AUTO: 206 10E9/L (ref 150–450)
POTASSIUM SERPL-SCNC: 4.2 MMOL/L (ref 3.4–5.3)
PROT SERPL-MCNC: 7 G/DL (ref 6.8–8.8)
RBC # BLD AUTO: 4.75 10E12/L (ref 3.8–5.2)
SODIUM SERPL-SCNC: 140 MMOL/L (ref 133–144)
WBC # BLD AUTO: 4.6 10E9/L (ref 4–11)

## 2018-06-19 RX ORDER — CHLORAL HYDRATE 500 MG
2 CAPSULE ORAL
COMMUNITY
End: 2019-02-06

## 2018-06-19 RX ORDER — VITAMIN B COMPLEX
2 TABLET ORAL
COMMUNITY
End: 2019-02-06

## 2018-06-19 ASSESSMENT — PAIN SCALES - GENERAL: PAINLEVEL: NO PAIN (0)

## 2018-06-19 NOTE — PATIENT INSTRUCTIONS
Great to see you again today. I am so happy you are doing so well.    Keep up the good work    Continue the Canasa as needed    Check kidney function blood work today    Follow up in 1 year     Call with questions

## 2018-06-19 NOTE — MR AVS SNAPSHOT
After Visit Summary   6/19/2018    Doris Fritz    MRN: [id number]           Patient Information     Date Of Birth          1970        Visit Information        Provider Department      6/19/2018 10:00 AM Denzel Hall MD [facility]        Today's Diagnoses     Ulcerative proctitis without complication (H)    -  1      Care Instructions    Great to see you again today. I am so happy you are doing so well.    Keep up the good work    Continue the Canasa as needed    Check kidney function blood work today    Follow up in 1 year     Call with questions          Follow-ups after your visit        Your next 10 appointments already scheduled     Jun 19, 2018 10:45 AM CDT   LAB with  LAB   [facility] ([facility])    [address]  1st Floor  [facility] [address] [phone]           Please do not eat 10-12 hours before your appointment if you are coming in fasting for labs on lipids, cholesterol, or glucose (sugar). This does not apply to pregnant women. Water, hot tea and black coffee (with nothing added) are okay. Do not drink other fluids, diet soda or chew gum.              Future tests that were ordered for you today     Open Future Orders        Priority Expected Expires Ordered    Creatinine Routine  6/19/2019 6/19/2018            Who to contact     Please call your clinic at [phone] to:    Ask questions about your health    Make or cancel appointments    Discuss your medicines    Learn about your test results    Speak to your doctor            Additional Information About Your Visit        MyChart Information     [employer] gives you secure access to your electronic health record. If you see a primary care provider, you can also send messages to your care team and make appointments. If you have questions, please call your primary care clinic.  If you do not have a primary care provider, please call  [phone] and they will assist you.      [employer] is an electronic gateway that provides easy, online access to your medical records. With [employer], you can request a clinic appointment, read your test results, renew a prescription or communicate with your care team.     To access your existing account, please contact your [facility] or call [phone] for assistance.        Care EveryWhere ID     This is your Care EveryWhere ID. This could be used by other organizations to access your [place] medical records  [id number]        Your Vitals Were     Pulse Temperature Pulse Oximetry BMI (Body Mass Index)          89 98.2  F (36.8  C) (Oral) 100% 22.04 kg/m2         Blood Pressure from Last 3 Encounters:   06/19/18 117/73   11/14/17 110/74   05/16/16 115/62    Weight from Last 3 Encounters:   06/19/18 69.7 kg (153 lb 9.6 oz)   12/22/17 71.7 kg (158 lb)   11/14/17 70.8 kg (156 lb)               Primary Care Provider Office Phone # Fax #    Kathleenelisa Salazar [phone]       [place] [address]        Equal Access to Services     MARCY CLOUD AH: Hadii joe ybarra hadasho Soomaali, waaxda luqadaha, qaybta kaalmada adeegyada, abhishek simon. So [facility] [phone].    ATENCIÓN: Si habla español, tiene a aguilera disposición servicios gratuitos de asistencia lingüística. Llame al [phone].    We comply with applicable federal civil rights laws and [place] laws. We do not discriminate on the basis of race, color, national origin, age, disability, sex, sexual orientation, or gender identity.            Thank you!     Thank you for choosing [facility]  for your care. Our goal is always to provide you with excellent care. Hearing back from our patients is one way we can continue to improve our services. Please take a few minutes to complete the written survey that you may receive in  the mail after your visit with us. Thank you!             Your Updated Medication List - Protect others around you: Learn how to safely use, store and throw away your medicines at www.disposemymeds.org.          This list is accurate as of 6/19/18 10:28 AM.  Always use your most recent med list.                   Brand Name Dispense Instructions for use Diagnosis    Enema Bottle Misc     1 Device    Place 1 Device rectally as needed 1 - 2 tap water enemas morning of precedure    Ulcerative (chronic) proctitis, without complications (H)       HYDROcodone-acetaminophen 5-500 MG per tablet    VICODIN     Take 1-2 tablets by mouth every 6 hours as needed for moderate to severe pain        IRON PO      Take 2 tablets by mouth        Levothyroxine-Liothyronine 15 MG Tabs      Take one po in am        magnesium 250 MG tablet      Take 1 tablet by mouth daily        mesalamine 1000 MG Suppository    CANASA    90 suppository    Place 1 suppository rectally  at bedtime as needed.    Ulcerative colitis (H)       Omega-3 1000 MG Caps      Take 2 capsules by mouth        PROZAC PO      Take 20 mg by mouth daily        TRAZODONE HCL PO      Take 50 mg by mouth At Bedtime        Vitamin-B Complex Tabs      Take 2 tablets by mouth

## 2018-06-19 NOTE — NURSING NOTE
Chief Complaint   Patient presents with     Gastrointestinal Problem     6 month follow up for Irritable Bowel Disease       Vitals:    06/19/18 1004   BP: 117/73   BP Location: Left arm   Patient Position: Sitting   Cuff Size: Adult Regular   Pulse: 89   Temp: 98.2  F (36.8  C)   TempSrc: Oral   SpO2: 100%   Weight: 69.7 kg (153 lb 9.6 oz)       Body mass index is 22.04 kg/(m^2).      Sugey English LPN

## 2018-06-19 NOTE — LETTER
6/19/2018       RE: Doris Fritz  [address]  [facility] [address]     Dear Colleague,    Thank you for referring your patient, Doris Fritz, to the [facility] GASTRO[facility]OLOGY AND IBD CLINIC at [facility]. Please see a copy of my visit note below.    OUTPATIENT GI FOLLOWUP VISIT        PRIMARY CARE PHYSICIAN:  Dr. Kathleen Salazar.       CHIEF COMPLAINT:  Ulcerative proctitis and abdominal bloating/constipation.       ULCERATIVE COLITIS HISTORY:   1.  Age at diagnosis:  Mid 30s.  Diagnosed by Dr. Taylor in the early 2000s.   2.  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  Most recent flex sig at the end of 2015.  Biopsies of the descending, transverse and sigmoid colon only showed melanosis coli.  Rectal biopsies showed chronic active colitis.   3.  Previous ulcerative colitis surgeries:  None.   4.  Current ulcerative colitis medications:  None.     5.  Previous ulcerative colitis medications:  She has taken Canasa suppositories intermittently only.       HPI/Today:    Doris is doing great today!    She is exercising regularly which really helps.    She did go to biofeedback (pelvic floor). Very helpful.    Having 1 BM every other day. Also added flax seed crackers.    No EIM.    Has not needed Canasa recently. No symptoms.    Please see prior notes.      REVIEW OF SYSTEMS:  A complete review of systems was performed.  Pertinent positives and negatives are as stated above in the HPI.  The remainder of a complete review of systems is unremarkable.       ADDITIONAL PAST MEDICAL HISTORY:   1.  Fibromyalgia.   2.  Chronic Lyme.  It is not clear to me how this was diagnosed.  She follows with an alternative medicine doctor.  She takes herbal antimicrobials for this.  I have cautioned her multiple times about taking herbal medications.   3.  Diagnosed with SIBO on elevated hydrogen and methane breath test.  She did not  respond to Cipro and Flagyl, but had improvement on rifaximin twice.   4.  Endometriosis.  Reports this was diagnosed by laparoscopy in the 1990s.  She has not had any treatment for this.  She does have some worsening symptoms while menstruating; however, she has not had recent issues with this.  Please see my initial consult note for further details of this.   5.  History of depression.   6.  Chronic pain.  This is associated with her chronic fatigue and fibromyalgia.   7.  Cognitive impairment, details are unclear.   8.  Recurrent UTIs.  She previously was on recurrent antibiotics for this.       FAMILY HISTORY:  This was reviewed.  No history of IBD, liver disease or pancreas disease.  No family history of colon cancer or colon polyps.       SOCIAL HISTORY:  Reviewed with no changes      MEDICATIONS:   Current Outpatient Prescriptions   Medication     FLUoxetine HCl (PROZAC PO)     HYDROcodone-acetaminophen (VICODIN) 5-500 MG per tablet     magnesium 250 MG tablet     Thyroid (LEVOTHYROXINE-LIOTHYRONINE) 15 MG TABS     TRAZODONE HCL PO     B Complex Vitamins (VITAMIN-B COMPLEX) TABS     IRON PO     mesalamine (CANASA) 1000 MG Suppository     Omega-3 1000 MG CAPS     Rubber Goods (ENEMA BOTTLE) MISC     No current facility-administered medications for this visit.          PHYSICAL EXAMINATION:   /73 (BP Location: Left arm, Patient Position: Sitting, Cuff Size: Adult Regular)  Pulse 89  Temp 98.2  F (36.8  C) (Oral)  Wt 69.7 kg (153 lb 9.6 oz)  SpO2 100%  BMI 22.04 kg/m2  GENERAL:  She is awake, alert and oriented x3, with no focal deficits.  Pleasant and interactive.   HEENT:  Head is atraumatic and normocephalic.  Sclerae are without injection.  Oropharynx is clear.   ABDOMEN:  Soft, nontender and nondistended, positive bowel sounds.       LABORATORY DATA:  Reviewed in care everywhere from 1/2018      ASSESSMENT AND PLAN:   1.  Ulcerative proctitis.  PRN canasa. No changes today    2.   Constipation/pelvic floor dysfunction. Improved with biofeedback and flaxseed crackers. Has not tolerated other fiber or miralax. Continue exercise.    3.  IBS and small intestinal bacterial overgrowth. Hydrogen breath test negative. Manage conservatively    4. Depression. No changes    5.  Health care maintenance.  I did discuss this with the patient.  I do recommend a yearly flu shot; however, she does not like flu shots, but she will think about this.  I also recommend a pneumonia vaccine, and a 5-year booster if this has not been done.  She should be up-to-date on her health maintenance vaccines, including Tdap.  She is okay to get live vaccines if needed.  She should take calcium and vitamin D on a daily basis.  She has never had steroids, so I do not think she needs a DEXA scan.  She should avoid NSAIDs and tobacco.  She does not need yearly skin exams, as she is not immunosuppressed.      Follow up in 12 months or sooner if needed      Thank you very much for the opportunity to take part in the care of this patient.  Please do not hesitate to call with questions.      Denzel Hall MD    [facility]  Division of Gastroenterology, Hepatology and Nutrition

## 2018-06-19 NOTE — PROGRESS NOTES
OUTPATIENT GI FOLLOWUP VISIT        PRIMARY CARE PHYSICIAN:  Dr. Kathleen Salazar.       CHIEF COMPLAINT:  Ulcerative proctitis and abdominal bloating/constipation.       ULCERATIVE COLITIS HISTORY:   1.  Age at diagnosis:  Mid 30s.  Diagnosed by Dr. Taylor in the early 2000s.   2.  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  Most recent flex sig at the end of 2015.  Biopsies of the descending, transverse and sigmoid colon only showed melanosis coli.  Rectal biopsies showed chronic active colitis.   3.  Previous ulcerative colitis surgeries:  None.   4.  Current ulcerative colitis medications:  None.     5.  Previous ulcerative colitis medications:  She has taken Canasa suppositories intermittently only.       HPI/Today:    Doris is doing great today!    She is exercising regularly which really helps.    She did go to biofeedback (pelvic floor). Very helpful.    Having 1 BM every other day. Also added flax seed crackers.    No EIM.    Has not needed Canasa recently. No symptoms.    Please see prior notes.      REVIEW OF SYSTEMS:  A complete review of systems was performed.  Pertinent positives and negatives are as stated above in the HPI.  The remainder of a complete review of systems is unremarkable.       ADDITIONAL PAST MEDICAL HISTORY:   1.  Fibromyalgia.   2.  Chronic Lyme.  It is not clear to me how this was diagnosed.  She follows with an alternative medicine doctor.  She takes herbal antimicrobials for this.  I have cautioned her multiple times about taking herbal medications.   3.  Diagnosed with SIBO on elevated hydrogen and methane breath test.  She did not respond to Cipro and Flagyl, but had improvement on rifaximin twice.   4.  Endometriosis.  Reports this was diagnosed by laparoscopy in the 1990s.  She has not had any treatment for this.  She does have some worsening symptoms while menstruating; however, she has not had recent issues with this.  Please see my  initial consult note for further details of this.   5.  History of depression.   6.  Chronic pain.  This is associated with her chronic fatigue and fibromyalgia.   7.  Cognitive impairment, details are unclear.   8.  Recurrent UTIs.  She previously was on recurrent antibiotics for this.       FAMILY HISTORY:  This was reviewed.  No history of IBD, liver disease or pancreas disease.  No family history of colon cancer or colon polyps.       SOCIAL HISTORY:  Reviewed with no changes      MEDICATIONS:   Current Outpatient Prescriptions   Medication     FLUoxetine HCl (PROZAC PO)     HYDROcodone-acetaminophen (VICODIN) 5-500 MG per tablet     magnesium 250 MG tablet     Thyroid (LEVOTHYROXINE-LIOTHYRONINE) 15 MG TABS     TRAZODONE HCL PO     B Complex Vitamins (VITAMIN-B COMPLEX) TABS     IRON PO     mesalamine (CANASA) 1000 MG Suppository     Omega-3 1000 MG CAPS     Rubber Goods (ENEMA BOTTLE) MISC     No current facility-administered medications for this visit.          PHYSICAL EXAMINATION:   /73 (BP Location: Left arm, Patient Position: Sitting, Cuff Size: Adult Regular)  Pulse 89  Temp 98.2  F (36.8  C) (Oral)  Wt 69.7 kg (153 lb 9.6 oz)  SpO2 100%  BMI 22.04 kg/m2  GENERAL:  She is awake, alert and oriented x3, with no focal deficits.  Pleasant and interactive.   HEENT:  Head is atraumatic and normocephalic.  Sclerae are without injection.  Oropharynx is clear.   ABDOMEN:  Soft, nontender and nondistended, positive bowel sounds.       LABORATORY DATA:  Reviewed in care everywhere from 1/2018      ASSESSMENT AND PLAN:   1.  Ulcerative proctitis.  PRN canasa. No changes today    2.  Constipation/pelvic floor dysfunction. Improved with biofeedback and flaxseed crackers. Has not tolerated other fiber or miralax. Continue exercise.    3.  IBS and small intestinal bacterial overgrowth. Hydrogen breath test negative. Manage conservatively    4. Depression. No changes    5.  Health care maintenance.  I did  discuss this with the patient.  I do recommend a yearly flu shot; however, she does not like flu shots, but she will think about this.  I also recommend a pneumonia vaccine, and a 5-year booster if this has not been done.  She should be up-to-date on her health maintenance vaccines, including Tdap.  She is okay to get live vaccines if needed.  She should take calcium and vitamin D on a daily basis.  She has never had steroids, so I do not think she needs a DEXA scan.  She should avoid NSAIDs and tobacco.  She does not need yearly skin exams, as she is not immunosuppressed.      Follow up in 12 months or sooner if needed      Thank you very much for the opportunity to take part in the care of this patient.  Please do not hesitate to call with questions.      Denzel Hall MD    [facility]  Division of Gastroenterology, Hepatology and Nutrition    Answers for HPI/ROS submitted by the patient on 6/5/2018   General Symptoms: Yes  Skin Symptoms: No  HENT Symptoms: No  EYE SYMPTOMS: No  HEART SYMPTOMS: No  LUNG SYMPTOMS: No  INTESTINAL SYMPTOMS: No  URINARY SYMPTOMS: No  GYNECOLOGIC SYMPTOMS: Yes  BREAST SYMPTOMS: No  SKELETAL SYMPTOMS: No  BLOOD SYMPTOMS: No  NERVOUS SYSTEM SYMPTOMS: No  MENTAL HEALTH SYMPTOMS: Yes  Fever: No  Loss of appetite: No  Weight loss: No  Weight gain: Yes  Fatigue: Yes  Night sweats: Yes  Chills: No  Increased stress: No  Excessive hunger: No  Excessive thirst: No  Feeling hot or cold when others believe the temperature is normal: Yes  Loss of height: No  Post-operative complications: No  Surgical site pain: No  Hallucinations: No  Change in or Loss of Energy: No  Hyperactivity: No  Confusion: No  Bleeding or spotting between periods: No  Heavy or painful periods: No  Irregular periods: No  Vaginal discharge: No  Hot flashes: No  Vaginal dryness: Yes  Genital ulcers: No  Reduced libido: Yes  Painful intercourse: Yes  Difficulty with sexual arousal:  Yes  Post-menopausal bleeding: No  Nervous or Anxious: Yes  Depression: Yes  Trouble sleeping: No  Trouble thinking or concentrating: Yes  Mood changes: No  Panic attacks: No

## 2018-07-17 ENCOUNTER — AMBULATORY - HEALTHEAST (OUTPATIENT)
Dept: LAB | Facility: CLINIC | Age: 48
End: 2018-07-17

## 2018-07-17 DIAGNOSIS — E03.8 SUBCLINICAL HYPOTHYROIDISM: ICD-10-CM

## 2018-07-17 LAB
T3FREE SERPL-MCNC: 2.2 PG/ML (ref 1.9–3.9)
T4 FREE SERPL-MCNC: 1 NG/DL (ref 0.7–1.8)
TSH SERPL DL<=0.005 MIU/L-ACNC: 1.06 UIU/ML (ref 0.3–5)

## 2018-07-18 ENCOUNTER — COMMUNICATION - HEALTHEAST (OUTPATIENT)
Dept: FAMILY MEDICINE | Facility: CLINIC | Age: 48
End: 2018-07-18

## 2018-07-23 ENCOUNTER — RECORDS - HEALTHEAST (OUTPATIENT)
Dept: ADMINISTRATIVE | Facility: OTHER | Age: 48
End: 2018-07-23

## 2018-08-21 ENCOUNTER — COMMUNICATION - HEALTHEAST (OUTPATIENT)
Dept: FAMILY MEDICINE | Facility: CLINIC | Age: 48
End: 2018-08-21

## 2018-08-21 DIAGNOSIS — E03.8 SUBCLINICAL HYPOTHYROIDISM: ICD-10-CM

## 2018-08-24 ENCOUNTER — TRANSFERRED RECORDS (OUTPATIENT)
Dept: HEALTH INFORMATION MANAGEMENT | Facility: CLINIC | Age: 48
End: 2018-08-24

## 2018-08-24 ENCOUNTER — OFFICE VISIT - HEALTHEAST (OUTPATIENT)
Dept: FAMILY MEDICINE | Facility: CLINIC | Age: 48
End: 2018-08-24

## 2018-08-24 DIAGNOSIS — E03.9 HYPOTHYROIDISM, UNSPECIFIED TYPE: ICD-10-CM

## 2018-08-24 DIAGNOSIS — R63.5 WEIGHT GAIN: ICD-10-CM

## 2018-08-24 DIAGNOSIS — K92.9 DIGESTIVE DISORDER: ICD-10-CM

## 2018-08-24 LAB
CRP SERPL HS-MCNC: 3.2 MG/L (ref 0–3)
HBA1C MFR BLD: 4.7 % (ref 3.5–6)

## 2018-08-24 ASSESSMENT — MIFFLIN-ST. JEOR: SCORE: 1408.07

## 2018-08-28 ENCOUNTER — COMMUNICATION - HEALTHEAST (OUTPATIENT)
Dept: FAMILY MEDICINE | Facility: CLINIC | Age: 48
End: 2018-08-28

## 2018-08-28 LAB — T3REVERSE SERPL-MCNC: 18.2 NG/DL (ref 9–27)

## 2018-09-13 ENCOUNTER — OFFICE VISIT - HEALTHEAST (OUTPATIENT)
Dept: FAMILY MEDICINE | Facility: CLINIC | Age: 48
End: 2018-09-13

## 2018-09-13 DIAGNOSIS — Z71.9 HEALTH EDUCATION: ICD-10-CM

## 2018-09-14 ENCOUNTER — TRANSFERRED RECORDS (OUTPATIENT)
Dept: HEALTH INFORMATION MANAGEMENT | Facility: CLINIC | Age: 48
End: 2018-09-14

## 2018-09-14 ENCOUNTER — OFFICE VISIT - HEALTHEAST (OUTPATIENT)
Dept: FAMILY MEDICINE | Facility: CLINIC | Age: 48
End: 2018-09-14

## 2018-09-14 DIAGNOSIS — K58.9 IRRITABLE BOWEL SYNDROME: ICD-10-CM

## 2018-09-14 DIAGNOSIS — R19.8 ENLARGEMENT ABDOMEN: ICD-10-CM

## 2018-09-14 DIAGNOSIS — F50.9 EATING DISORDER: ICD-10-CM

## 2018-09-14 DIAGNOSIS — K59.00 CONSTIPATION: ICD-10-CM

## 2018-09-14 ASSESSMENT — MIFFLIN-ST. JEOR: SCORE: 1402.17

## 2018-09-18 ENCOUNTER — HOSPITAL ENCOUNTER (OUTPATIENT)
Dept: CT IMAGING | Facility: HOSPITAL | Age: 48
Discharge: HOME | End: 2018-09-18
Attending: FAMILY MEDICINE

## 2018-09-18 ENCOUNTER — TRANSFERRED RECORDS (OUTPATIENT)
Dept: HEALTH INFORMATION MANAGEMENT | Facility: CLINIC | Age: 48
End: 2018-09-18

## 2018-09-18 ENCOUNTER — COMMUNICATION - HEALTHEAST (OUTPATIENT)
Dept: FAMILY MEDICINE | Facility: CLINIC | Age: 48
End: 2018-09-18

## 2018-09-18 DIAGNOSIS — R19.8 ENLARGEMENT ABDOMEN: ICD-10-CM

## 2018-09-18 DIAGNOSIS — R19.00 PELVIC MASS: ICD-10-CM

## 2018-09-21 ENCOUNTER — TRANSFERRED RECORDS (OUTPATIENT)
Dept: HEALTH INFORMATION MANAGEMENT | Facility: CLINIC | Age: 48
End: 2018-09-21

## 2018-09-21 ENCOUNTER — HOSPITAL ENCOUNTER (OUTPATIENT)
Dept: ULTRASOUND IMAGING | Facility: HOSPITAL | Age: 48
Discharge: HOME | End: 2018-09-21
Attending: FAMILY MEDICINE

## 2018-09-21 ENCOUNTER — COMMUNICATION - HEALTHEAST (OUTPATIENT)
Dept: FAMILY MEDICINE | Facility: CLINIC | Age: 48
End: 2018-09-21

## 2018-09-21 DIAGNOSIS — R19.00 PELVIC MASS: ICD-10-CM

## 2018-10-01 ENCOUNTER — CARE COORDINATION (OUTPATIENT)
Dept: GASTROENTEROLOGY | Facility: CLINIC | Age: 48
End: 2018-10-01

## 2018-10-01 NOTE — PROGRESS NOTES
Left a message for pt to sign a release of information for images to be pushed to the [facility] so our radiologist can read the images. Emailed the release of form for pt to sign and return.

## 2018-10-11 ENCOUNTER — CARE COORDINATION (OUTPATIENT)
Dept: GASTROENTEROLOGY | Facility: CLINIC | Age: 48
End: 2018-10-11

## 2018-10-11 DIAGNOSIS — K51.20 ULCERATIVE PROCTITIS (H): Primary | ICD-10-CM

## 2018-10-11 DIAGNOSIS — R14.0 BLOATING: ICD-10-CM

## 2018-10-11 DIAGNOSIS — K59.00 CONSTIPATION: ICD-10-CM

## 2018-10-12 ENCOUNTER — HOSPITAL ENCOUNTER (INPATIENT)
Dept: GENERAL RADIOLOGY | Facility: CLINIC | Age: 48
End: 2018-10-12
Attending: INTERNAL MEDICINE

## 2018-10-12 DIAGNOSIS — R14.0 BLOATING: ICD-10-CM

## 2018-10-12 DIAGNOSIS — K59.00 CONSTIPATION: ICD-10-CM

## 2018-10-12 DIAGNOSIS — K51.20 ULCERATIVE PROCTITIS (H): ICD-10-CM

## 2018-10-18 ENCOUNTER — CARE COORDINATION (OUTPATIENT)
Dept: GASTROENTEROLOGY | Facility: CLINIC | Age: 48
End: 2018-10-18

## 2018-10-18 DIAGNOSIS — N36.1 URETHRA, DIVERTICULUM: ICD-10-CM

## 2018-10-18 DIAGNOSIS — N39.0 CHRONIC UTI: Primary | ICD-10-CM

## 2018-10-23 ENCOUNTER — PRE VISIT (OUTPATIENT)
Dept: UROLOGY | Facility: CLINIC | Age: 48
End: 2018-10-23

## 2018-10-23 NOTE — TELEPHONE ENCOUNTER
MEDICAL RECORDS REQUEST   [facility]  Urology Clinic  [address] [place] [address]  PHONE: [phone]  Fax: [phone]        FUTURE VISIT INFORMATION                                                   Doris Fritz : 1970 scheduled for future visit at [facility]    APPOINTMENT INFORMATION:    Date: 2018    Provider:  Paco Brand    Reason for Visit/Diagnosis: Consult for UTI    REFERRAL INFORMATION:    Referring provider:  Denzel Hall    Specialty: MD    Referring providers clinic: GI    Clinic contact number: [phone]     RECORDS REQUESTED FOR VISIT                                                     NOTES  STATUS/DETAILS   OFFICE NOTE from referring provider  yes   OFFICE NOTE from other specialist  yes   DISCHARGE SUMMARY from hospital  no   DISCHARGE REPORT from the ER  no   OPERATIVE REPORT  no   MEDICATION LIST  yes       PRE-VISIT CHECKLIST      Record collection complete Yes   Appointment appropriately scheduled           (right time/right provider) Yes   MyChart activation Yes   Questionnaire complete If no, please explain in process     Completed by: Hayley Gerard

## 2018-11-15 ENCOUNTER — TRANSFERRED RECORDS (OUTPATIENT)
Dept: HEALTH INFORMATION MANAGEMENT | Facility: CLINIC | Age: 48
End: 2018-11-15

## 2018-11-16 ENCOUNTER — COMMUNICATION - HEALTHEAST (OUTPATIENT)
Dept: FAMILY MEDICINE | Facility: CLINIC | Age: 48
End: 2018-11-16

## 2018-11-20 ENCOUNTER — PRE VISIT (OUTPATIENT)
Dept: UROLOGY | Facility: CLINIC | Age: 48
End: 2018-11-20

## 2018-11-20 NOTE — TELEPHONE ENCOUNTER
Patient with history of rUTIs coming in for consult. Patient chart reviewed, no need for call, all records available and ready for appointment.

## 2018-11-25 ASSESSMENT — ENCOUNTER SYMPTOMS
ALTERED TEMPERATURE REGULATION: 0
BRUISES/BLEEDS EASILY: 1
DYSURIA: 0
POOR WOUND HEALING: 1
DEPRESSION: 1
SWOLLEN GLANDS: 0
NAIL CHANGES: 0
NERVOUS/ANXIOUS: 0
HOT FLASHES: 1
DIARRHEA: 0
JOINT SWELLING: 0
INSOMNIA: 0
NAUSEA: 0
FEVER: 0
DIFFICULTY URINATING: 1
MUSCLE WEAKNESS: 1
RECTAL PAIN: 0
ABDOMINAL PAIN: 0
WEIGHT LOSS: 0
STIFFNESS: 1
CHILLS: 0
BACK PAIN: 1
JAUNDICE: 0
HALLUCINATIONS: 0
VOMITING: 0
ARTHRALGIAS: 0
CONSTIPATION: 1
MUSCLE CRAMPS: 0
SKIN CHANGES: 0
MYALGIAS: 1
FLANK PAIN: 0
BLOOD IN STOOL: 0
POLYPHAGIA: 0
BLOATING: 1
FATIGUE: 1
HEARTBURN: 0
INCREASED ENERGY: 0
PANIC: 0
POLYDIPSIA: 0
NIGHT SWEATS: 0
BOWEL INCONTINENCE: 0
DECREASED LIBIDO: 1
WEIGHT GAIN: 1
DECREASED CONCENTRATION: 1
DECREASED APPETITE: 1
NECK PAIN: 1
HEMATURIA: 0

## 2018-11-28 ENCOUNTER — OFFICE VISIT (OUTPATIENT)
Dept: UROLOGY | Facility: CLINIC | Age: 48
End: 2018-11-28
Payer: COMMERCIAL

## 2018-11-28 ENCOUNTER — RECORDS - HEALTHEAST (OUTPATIENT)
Dept: ADMINISTRATIVE | Facility: OTHER | Age: 48
End: 2018-11-28

## 2018-11-28 VITALS
BODY MASS INDEX: 22.48 KG/M2 | HEART RATE: 71 BPM | SYSTOLIC BLOOD PRESSURE: 123 MMHG | HEIGHT: 70 IN | DIASTOLIC BLOOD PRESSURE: 79 MMHG | WEIGHT: 157 LBS

## 2018-11-28 DIAGNOSIS — N36.1 URETHRAL DIVERTICULUM: Primary | ICD-10-CM

## 2018-11-28 RX ORDER — THYROID 15 MG/1
TABLET ORAL
COMMUNITY
Start: 2018-08-21 | End: 2019-02-06

## 2018-11-28 ASSESSMENT — PAIN SCALES - GENERAL: PAINLEVEL: NO PAIN (0)

## 2018-11-28 NOTE — NURSING NOTE
"Chief Complaint   Patient presents with     Consult     rUTIs and urethral diverticulum       Blood pressure 123/79, pulse 71, height 1.778 m (5' 10\"), weight 71.2 kg (157 lb), not currently breastfeeding. Body mass index is 22.53 kg/(m^2).    There is no problem list on file for this patient.      Allergies   Allergen Reactions     Tramadol Other (See Comments)     serotonin syndrome     Trazodone Other (See Comments)       Current Outpatient Prescriptions   Medication Sig Dispense Refill     B Complex Vitamins (VITAMIN-B COMPLEX) TABS Take 2 tablets by mouth       FLUoxetine HCl (PROZAC PO) Take 20 mg by mouth daily       HYDROcodone-acetaminophen (VICODIN) 5-500 MG per tablet Take 1-2 tablets by mouth every 6 hours as needed for moderate to severe pain       magnesium 250 MG tablet Take 1 tablet by mouth daily       Omega-3 1000 MG CAPS Take 2 capsules by mouth       Rubber Goods (ENEMA BOTTLE) MISC Place 1 Device rectally as needed 1 - 2 tap water enemas morning of precedure 1 Device 0     thyroid (ARMOUR THYROID) 15 MG tablet Take one po in am       TRAZODONE HCL PO Take 50 mg by mouth At Bedtime       IRON PO Take 2 tablets by mouth       mesalamine (CANASA) 1000 MG Suppository Place 1 suppository rectally  at bedtime as needed. (Patient not taking: Reported on 6/19/2018) 90 suppository 3     Thyroid (LEVOTHYROXINE-LIOTHYRONINE) 15 MG TABS Take one po in am         Social History   Substance Use Topics     Smoking status: Never Smoker     Smokeless tobacco: Never Used     Alcohol use 1.2 oz/week     0 Standard drinks or equivalent, 1 Glasses of wine, 1 Shots of liquor per week      Comment: occasional wine or vodka and soda       Tiara Mendosa LPN  11/28/2018  8:59 AM    "

## 2018-11-28 NOTE — MR AVS SNAPSHOT
"              After Visit Summary   11/28/2018    Doris Fritz    MRN: [id number]           Patient Information     Date Of Birth          1970        Visit Information        Provider Department      11/28/2018 9:00 AM Paco Brand MD [facility] Urology and [facility]        Today's Diagnoses     Urethral diverticulum    -  1       Follow-ups after your visit        Follow-up notes from your care team     Return if symptoms worsen or fail to improve.      Who to contact     Please call your clinic at [phone] to:    Ask questions about your health    Make or cancel appointments    Discuss your medicines    Learn about your test results    Speak to your doctor            Additional Information About Your Visit        [employer] Information     [employer] gives you secure access to your electronic health record. If you see a primary care provider, you can also send messages to your care team and make appointments. If you have questions, please call your primary care clinic.  If you do not have a primary care provider, please call [phone] and they will assist you.      [employer] is an electronic gateway that provides easy, online access to your medical records. With [employer], you can request a clinic appointment, read your test results, renew a prescription or communicate with your care team.     To access your existing account, please contact your [facility] or call [phone] for assistance.        Care EveryWhere ID     This is your Care EveryWhere ID. This could be used by other organizations to access your [facility] medical records  [id number]        Your Vitals Were     Pulse Height BMI (Body Mass Index)             71 1.778 m (5' 10\") 22.53 kg/m2          Blood Pressure from Last 3 Encounters:   11/28/18 123/79   06/19/18 117/73   11/14/17 110/74    Weight from Last 3 Encounters:   11/28/18 71.2 kg (157 lb)   06/19/18 69.7 kg (153 lb 9.6 " oz)   12/22/17 71.7 kg (158 lb)              We Performed the Following     POST-VOID RESIDUAL BLADDER SCAN        Primary Care Provider Office Phone # Fax #    Kathleen Salazra [phone]       [place] [phone]        Equal Access to Services     LENNYALMA AI : Hadii aad ku hadasho Soomaali, waaxda luqadaha, qaybta kaalmada adeegyada, waxay idiin hayaan adezehra dorado lajazmin . So [facility] [phone].    ATENCIÓN: Si habla español, tiene a aguilera disposición servicios gratuitos de asistencia lingüística. Marni al [phone].    We comply with applicable federal civil rights laws and [place] laws. We do not discriminate on the basis of race, color, national origin, age, disability, sex, sexual orientation, or gender identity.            Thank you!     Thank you for choosing [facility] UROLOGY AND [facility] FOR PROST[facility] AND UROLOGIC CANCERS  for your care. Our goal is always to provide you with excellent care. Hearing back from our patients is one way we can continue to improve our services. Please take a few minutes to complete the written survey that you may receive in the mail after your visit with us. Thank you!             Your Updated Medication List - Protect others around you: Learn how to safely use, store and throw away your medicines at www.disposemymeds.org.          This list is accurate as of 11/28/18 10:20 AM.  Always use your most recent med list.                   Brand Name Dispense Instructions for use Diagnosis    Enema Bottle Misc     1 Device    Place 1 Device rectally as needed 1 - 2 tap water enemas morning of precedure    Ulcerative (chronic) proctitis, without complications (H)       HYDROcodone-acetaminophen 5-500 MG per tablet    VICODIN     Take 1-2 tablets by mouth every 6 hours as needed for moderate to severe pain        IRON PO      Take 2 tablets by mouth        * Levothyroxine-Liothyronine 15 MG Tabs      Take one po in am         * LAUREN THYROID 15 MG tablet   Generic drug:  thyroid      Take one po in am        magnesium 250 MG tablet      Take 1 tablet by mouth daily        mesalamine 1000 MG suppository    CANASA    90 suppository    Place 1 suppository rectally  at bedtime as needed.    Ulcerative colitis (H)       Omega-3 1000 MG capsule      Take 2 capsules by mouth        PROZAC PO      Take 20 mg by mouth daily        TRAZODONE HCL PO      Take 50 mg by mouth At Bedtime        Vitamin-B Complex Tabs      Take 2 tablets by mouth        * Notice:  This list has 2 medication(s) that are the same as other medications prescribed for you. Read the directions carefully, and ask your doctor or other care provider to review them with you.

## 2018-11-28 NOTE — PROGRESS NOTES
"We are pleased to see Mr. Doris Fritz in consultation at the request of Denzel Hall for the evaluation of chief complaint listed below    Chief Complaint:    Urethral diverticulum          History of Present Illness:   Doris Fritz is a(n) 48 year old female w/ PMH of ulcerative colitis, endometriosis,  and fibromyalgia, and urologic history of UTIs (patient doesn't have Cx taken, usually takes abx course when she calls in, frequency is once a year for the past couple years, presenting symptoms are frequency and dysuria) who presents for incidental finding of urethral diverticulum on CT scan done for UC.   Other than when she has a UTI, patient urinary symptoms are notable for frequency with small voids, nocturia 1-4, and \"shy bladder\" which she describes that she has difficulty starting stream since she was a kid, she has to close her eras in public restrooms and relax to be able to urinate            Past Medical History:     Past Medical History:   Diagnosis Date     Depressive disorder      Fibromyalgia    Ulcerative Colitis   Endometriosis            Past Surgical History:     Past Surgical History:   Procedure Laterality Date     ABDOMEN SURGERY      laparoscopy check ovaries     AS REDUCTION OF LARGE BREAST              Social History:   Doesn't work at the moment   Smoking: none   Alcohol: occasional   IV Drug Use: None         Family History:   History reviewed. No pertinent family history.  No urologic cancers in the family.         Allergies:     Allergies   Allergen Reactions     Tramadol Other (See Comments)     serotonin syndrome     Trazodone Other (See Comments)            Medications:     Current Outpatient Prescriptions   Medication Sig     B Complex Vitamins (VITAMIN-B COMPLEX) TABS Take 2 tablets by mouth     FLUoxetine HCl (PROZAC PO) Take 20 mg by mouth daily     HYDROcodone-acetaminophen (VICODIN) 5-500 MG per tablet Take 1-2 tablets by mouth every 6 hours as needed for " "moderate to severe pain     magnesium 250 MG tablet Take 1 tablet by mouth daily     Omega-3 1000 MG CAPS Take 2 capsules by mouth     Rubber Goods (ENEMA BOTTLE) MISC Place 1 Device rectally as needed 1 - 2 tap water enemas morning of precedure     thyroid (ARMOUR THYROID) 15 MG tablet Take one po in am     TRAZODONE HCL PO Take 50 mg by mouth At Bedtime     IRON PO Take 2 tablets by mouth     mesalamine (CANASA) 1000 MG Suppository Place 1 suppository rectally  at bedtime as needed. (Patient not taking: Reported on 6/19/2018)     Thyroid (LEVOTHYROXINE-LIOTHYRONINE) 15 MG TABS Take one po in am     No current facility-administered medications for this visit.             REVIEW OF SYSTEMS:    See HPI for pertinent details.  Remainder of 10-point ROS negative.         PHYSICAL EXAM   /79  Pulse 71  Ht 1.778 m (5' 10\")  Wt 71.2 kg (157 lb)  BMI 22.53 kg/m2  GENERAL: No acute distress. Well nourished.   HEENT:  Sclerae anicteric.  Conjunctivae pink.  Moist mucous membranes.  NECK:  Supple.  No lymphadenopathy.  CARDIAC:  Regular rate and rhythm.  LUNGS:  Non-labored breathing  BACK:  No costovertebral tenderness.  ABDOMEN: Soft, non-tender, no surgical scars, no organomegaly, non-tender.  SKIN: No rashes.  Dry.     EXTREMITIES:  Warm, well perfused.  no lower extremity edema bilaterally.  NEURO: normal gait, no focal deficits.           LABS AND IMAGING:   CT scan images reviewed today in urine   10/12/18      IMPRESSION:   1. 4.5 cm exophytic uterine fibroid.   2. 3.2 cm urethral diverticulum. Correlate with patient's clinical  history regarding urinary symptoms.  3. Moderate amount of stool in the colon. No evidence for active  inflammatory bowel disease on this routine CT abdomen pelvis.        ASSESSMENT/plan:   Doris Fritz is a 48 year old female who presents for incidental finding of urethral diverticulum on CT scan for UC   Patient doesn't have symptoms from it (feeling of bulge, painful " intercourse, recurrent UTIs) and isnt interested in treatment a this time.  - We explained the indication for treatment as well as surgical excision, approach and complications. Patient wants to hold off   - recommended PT for dysfunctional voiding to see if that is contributing to her straining, she had PT done for constipation that helped. She will use those techniques and would like to avoid anymore PT for now   - follow up ELENA Chapman MD  Urology PGY4    Thank you for allowing me to participate in the care of  Ms. Doris Fritz and I will keep you updated on her progress.  Patient was seen, evaluated and plan was formulated in conjunction with me and I agree with the above.    Paco Brand MD    CC: Rafael    Answers for HPI/ROS submitted by the patient on 11/25/2018   General Symptoms: Yes  Skin Symptoms: Yes  HENT Symptoms: No  EYE SYMPTOMS: No  HEART SYMPTOMS: No  LUNG SYMPTOMS: No  INTESTINAL SYMPTOMS: Yes  URINARY SYMPTOMS: Yes  GYNECOLOGIC SYMPTOMS: Yes  BREAST SYMPTOMS: No  SKELETAL SYMPTOMS: Yes  BLOOD SYMPTOMS: Yes  NERVOUS SYSTEM SYMPTOMS: No  MENTAL HEALTH SYMPTOMS: Yes  Fever: No  Loss of appetite: Yes  Weight loss: No  Weight gain: Yes  Fatigue: Yes  Night sweats: No  Chills: No  Increased stress: No  Excessive hunger: No  Excessive thirst: No  Feeling hot or cold when others believe the temperature is normal: No  Loss of height: No  Post-operative complications: No  Surgical site pain: No  Hallucinations: No  Change in or Loss of Energy: No  Hyperactivity: No  Confusion: No  Changes in hair: No  Changes in moles/birth marks: No  Itching: No  Rashes: No  Changes in nails: No  Acne: No  Hair in places you don't want it: No  Change in facial hair: No  Warts: No  Non-healing sores: Yes  Scarring: No  Flaking of skin: No  Color changes of hands/feet in cold : No  Sun sensitivity: No  Skin thickening: No  Heart burn or indigestion: No  Nausea: No  Vomiting: No  Abdominal pain:  No  Bloating: Yes  Constipation: Yes  Diarrhea: No  Blood in stool: No  Black stools: No  Rectal or Anal pain: No  Fecal incontinence: No  Yellowing of skin or eyes: No  Vomit with blood: No  Change in stools: No  Trouble holding urine or incontinence: No  Pain or burning: No  Trouble starting or stopping: Yes  Increased frequency of urination: No  Blood in urine: No  Decreased frequency of urination: No  Frequent nighttime urination: No  Flank pain: No  Difficulty emptying bladder: Yes  Back pain: Yes  Muscle aches: Yes  Neck pain: Yes  Swollen joints: No  Joint pain: No  Bone pain: No  Muscle cramps: No  Muscle weakness: Yes  Joint stiffness: Yes  Bone fracture: No  Anemia: No  Swollen glands: No  Easy bleeding or bruising: Yes  Edema or swelling: No  Bleeding or spotting between periods: No  Heavy or painful periods: No  Irregular periods: No  Vaginal discharge: No  Hot flashes: Yes  Vaginal dryness: Yes  Genital ulcers: No  Reduced libido: Yes  Painful intercourse: Yes  Difficulty with sexual arousal: Yes  Post-menopausal bleeding: No  Nervous or Anxious: No  Depression: Yes  Trouble sleeping: No  Trouble thinking or concentrating: Yes  Mood changes: No  Panic attacks: No

## 2018-11-28 NOTE — LETTER
"11/28/2018       RE: Doris Fritz  [address]  [facility] [address]     Dear Colleague,    Thank you for referring your patient, Doris Fritz, to the [facility] UROLOGIC CANCERS at [facility]. Please see a copy of my visit note below.    We are pleased to see Mr. Doris Fritz in consultation at the request of Denzel Hall for the evaluation of chief complaint listed below    Chief Complaint:    Urethral diverticulum          History of Present Illness:   Doris Fritz is a(n) 48 year old female w/ PMH of ulcerative colitis, endometriosis,  and fibromyalgia, and urologic history of UTIs (patient doesn't have Cx taken, usually takes abx course when she calls in, frequency is once a year for the past couple years, presenting symptoms are frequency and dysuria) who presents for incidental finding of urethral diverticulum on CT scan done for UC.   Other than when she has a UTI, patient urinary symptoms are notable for frequency with small voids, nocturia 1-4, and \"shy bladder\" which she describes that she has difficulty starting stream since she was a kid, she has to close her eras in public restrooms and relax to be able to urinate            Past Medical History:     Past Medical History:   Diagnosis Date     Depressive disorder      Fibromyalgia    Ulcerative Colitis   Endometriosis            Past Surgical History:     Past Surgical History:   Procedure Laterality Date     ABDOMEN SURGERY      laparoscopy check ovaries     AS REDUCTION OF LARGE BREAST              Social History:   Doesn't work at the moment   Smoking: none   Alcohol: occasional   IV Drug Use: None         Family History:   History reviewed. No pertinent family history.  No urologic cancers in the family.         Allergies:     Allergies   Allergen Reactions     Tramadol Other (See Comments)     serotonin syndrome     Trazodone Other (See " "Comments)            Medications:     Current Outpatient Prescriptions   Medication Sig     B Complex Vitamins (VITAMIN-B COMPLEX) TABS Take 2 tablets by mouth     FLUoxetine HCl (PROZAC PO) Take 20 mg by mouth daily     HYDROcodone-acetaminophen (VICODIN) 5-500 MG per tablet Take 1-2 tablets by mouth every 6 hours as needed for moderate to severe pain     magnesium 250 MG tablet Take 1 tablet by mouth daily     Omega-3 1000 MG CAPS Take 2 capsules by mouth     Rubber Goods (ENEMA BOTTLE) MISC Place 1 Device rectally as needed 1 - 2 tap water enemas morning of precedure     thyroid (ARMOUR THYROID) 15 MG tablet Take one po in am     TRAZODONE HCL PO Take 50 mg by mouth At Bedtime     IRON PO Take 2 tablets by mouth     mesalamine (CANASA) 1000 MG Suppository Place 1 suppository rectally  at bedtime as needed. (Patient not taking: Reported on 6/19/2018)     Thyroid (LEVOTHYROXINE-LIOTHYRONINE) 15 MG TABS Take one po in am     No current facility-administered medications for this visit.           PHYSICAL EXAM   /79  Pulse 71  Ht 1.778 m (5' 10\")  Wt 71.2 kg (157 lb)  BMI 22.53 kg/m2  GENERAL: No acute distress. Well nourished.   HEENT:  Sclerae anicteric.  Conjunctivae pink.  Moist mucous membranes.  NECK:  Supple.  No lymphadenopathy.  CARDIAC:  Regular rate and rhythm.  LUNGS:  Non-labored breathing  BACK:  No costovertebral tenderness.  ABDOMEN: Soft, non-tender, no surgical scars, no organomegaly, non-tender.  SKIN: No rashes.  Dry.     EXTREMITIES:  Warm, well perfused.  no lower extremity edema bilaterally.  NEURO: normal gait, no focal deficits.           LABS AND IMAGING:   CT scan images reviewed today in urine   10/12/18      IMPRESSION:   1. 4.5 cm exophytic uterine fibroid.   2. 3.2 cm urethral diverticulum. Correlate with patient's clinical  history regarding urinary symptoms.  3. Moderate amount of stool in the colon. No evidence for active  inflammatory bowel disease on this routine CT " abdomen pelvis.        ASSESSMENT/plan:   Doris Fritz is a 48 year old female who presents for incidental finding of urethral diverticulum on CT scan for UC   Patient doesn't have symptoms from it (feeling of bulge, painful intercourse, recurrent UTIs) and isnt interested in treatment a this time.  - We explained the indication for treatment as well as surgical excision, approach and complications. Patient wants to hold off   - recommended PT for dysfunctional voiding to see if that is contributing to her straining, she had PT done for constipation that helped. She will use those techniques and would like to avoid anymore PT for now   - follow up ELENA Chapman MD  Urology PGY4    Thank you for allowing me to participate in the care of  Ms. Doris Fritz and I will keep you updated on her progress.  Patient was seen, evaluated and plan was formulated in conjunction with me and I agree with the above.    Paco Brand MD    CC: Rafael

## 2019-02-06 ENCOUNTER — OFFICE VISIT (OUTPATIENT)
Dept: FAMILY MEDICINE | Facility: CLINIC | Age: 49
End: 2019-02-06
Payer: COMMERCIAL

## 2019-02-06 VITALS
OXYGEN SATURATION: 98 % | HEART RATE: 83 BPM | SYSTOLIC BLOOD PRESSURE: 110 MMHG | BODY MASS INDEX: 22.12 KG/M2 | DIASTOLIC BLOOD PRESSURE: 78 MMHG | TEMPERATURE: 97.7 F | HEIGHT: 71 IN | WEIGHT: 158 LBS

## 2019-02-06 DIAGNOSIS — K59.09 CHRONIC CONSTIPATION: Primary | ICD-10-CM

## 2019-02-06 DIAGNOSIS — F41.9 ANXIETY AND DEPRESSION: ICD-10-CM

## 2019-02-06 DIAGNOSIS — T78.1XXA GASTROINTESTINAL FOOD SENSITIVITY: ICD-10-CM

## 2019-02-06 DIAGNOSIS — M79.7 FIBROMYALGIA: ICD-10-CM

## 2019-02-06 DIAGNOSIS — F32.A ANXIETY AND DEPRESSION: ICD-10-CM

## 2019-02-06 DIAGNOSIS — Z23 NEED FOR TDAP VACCINATION: ICD-10-CM

## 2019-02-06 DIAGNOSIS — R53.82 CHRONIC FATIGUE: ICD-10-CM

## 2019-02-06 RX ORDER — TRAZODONE HYDROCHLORIDE 50 MG/1
50 TABLET, FILM COATED ORAL AT BEDTIME
Qty: 90 TABLET | Refills: 3 | Status: SHIPPED | OUTPATIENT
Start: 2019-02-06 | End: 2022-11-11

## 2019-02-06 ASSESSMENT — MIFFLIN-ST. JEOR: SCORE: 1438.81

## 2019-02-06 NOTE — PROGRESS NOTES
Doris Fritz is a 48 year old female here to establish care. She would like to discuss the following issues:    Food sensitivities  Doris reports she has many food sensitivities. She cannot eat dairy, nuts,  Egg or wheat. Ingesting these foods triggers bloating and constipation. No hives or vomiting. She has seen Johnny Grigsby twice to help with her symptoms.     Chronic constipation  She reports previous hx of proctitis, first diagnosed in 2003. She reports she gets a flare about once a year and uses mesalamine suppositories prn.  She has chronic constipation and usually uses magnesium or enemas to alleviate her symptoms. She has never had a small bowel work up. Has never had a trial of Amitiza or Linzess and is not sure she wants to take medication. Her last colonoscopy at [facility] was in 2010. She had a Flex sig in 2015 at the [facility] with Dr. Hall.     Fibromyalgia  She reports chronic history of muscle tightness in her neck, shoulders, lower back, and hips. She does a stretching program and has a walking program. She finds massage therapy and acupuncture helpful. She reports she has tried and failed Lyrica and Gabapentin. Prolonged sitting or standing can trigger symptoms. No headaches.     Endometriosis  She has followed with ob/gyn for history of endometriosis. She has had laparoscopy, has been on OCP to help treat. She has been in menopause x 2 yrs   She now has hot flashes. She is sexually active with male partner and reports vaginal dryness and discomfort with intercourse.     Chronic fatigue  She reports chronic fatigue history spanning at least 3 yrs. She reports her sleep is consistent, 6-8 hr but she awakens, without refreshed sleep. She tires easily with activity. She was recently started on iodine supplements by Dr. Grigsby.     Depression and anxiety  She currently sees a psychiatrist and a counselor at the [facility]. She takes fluoxetine 40mg daily  "and trazodone 50mg po q hs which helps with sleep. Previous hx of bullemia in remission, no hx of anorexia. No use of alcohol or drugs. No suicidal ideation. No hospitalizations. She is asking if I would be willing to write trazodone rx for her. This works well. She understands that constipation is a known side effect.     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 (on disability)    Habits  Tob: none  Etoh: rare intake  Caffeine 2-3 per day  Calcium: rare intake, hx of low vitamin D  Exercise 3x per week    Patient Active Problem List   Diagnosis     Urethral diverticulum       Current Outpatient Medications   Medication Sig Dispense Refill     FLUoxetine HCl (PROZAC PO) Take 40 mg by mouth daily        HYDROcodone-acetaminophen (VICODIN) 5-500 MG per tablet Take 1-2 tablets by mouth every 6 hours as needed for moderate to severe pain       magnesium 250 MG tablet Take 1 tablet by mouth daily       mesalamine (CANASA) 1000 MG Suppository Place 1 suppository rectally  at bedtime as needed. 90 suppository 3     traZODone (DESYREL) 50 MG tablet Take 1 tablet (50 mg) by mouth At Bedtime 90 tablet 3     vitamin B6 (PYRIDOXINE) 250 MG tablet Take 1 tablet (250 mg) by mouth daily 90 tablet 3       Allergies   Allergen Reactions     Tramadol Other (See Comments)     serotonin syndrome     Trazodone Other (See Comments)      EXAM  /78   Pulse 83   Temp 97.7  F (36.5  C) (Oral)   Ht 1.805 m (5' 11.06\")   Wt 71.7 kg (158 lb)   SpO2 98%   BMI 22.00 kg/m    Gen: Alert, pleasant, NAD  HEENT:  Conjunctiva nl, TM normal bilaterally, OP clear, no posterior erythema  COR: S1,S2,  no murmur  Lungs: CTA bilaterally, no rhonchi, wheezes or rales  Abdomen: Soft, " non tender, normal bowel sounds, no HSM or mass  Ext: no peripheral edema, pulses full  MS: Point tenderness over trapezius, rhomboid muscles bilaterally    PHQ9 score = 11  TJ 7 score = 8    Assessment:  (K59.09) Chronic constipation  (primary encounter diagnosis)  Comment: longstanding issue.   Plan: GASTROENTEROLOGY ADULT REF CONSULT ONLY        Recommend she make an appt with GI clinic. May benefit from comprehensive workup, SIBO testing. Discussed trial of miralax, use once daily x 4-6 days then titrate to effect.     (Z23) Need for Tdap vaccination  Comment: due   Plan: TDAP VACCINE (BOOSTRIX)        given    (F41.9,  F32.9) Anxiety and depression  Comment: longstanding issues, sees psychiatrist and counselor  Plan: traZODone (DESYREL) 50 MG tablet        Ok with refilling trazodone, continue counseling, discussed that trazodone may trigger constipation    (R53.82) Chronic fatigue  Comment: ongoing, disabled  Plan: continue work with Dr. Grigsby    (R19.8) Gastrointestinal food sensitivity  Comment: avoids, dairy, wheat, egg, nut  Plan: work with GI clinic regarding food sensitivities. Consider FODMAP diet    (M79.7) Fibromyalgia  Comment: ongoing issues, muscle tension, fatigue, failed lyrica and gabapentin  Plan: massage, acupuncture, daily walking program, continue counseling.    Lenora Do MD  Internal Medicine/Pediatrics

## 2019-02-06 NOTE — NURSING NOTE
"48 year old  Chief Complaint   Patient presents with     Establish Care     pt coming from [facility]       Blood pressure 110/78, pulse 83, temperature 97.7  F (36.5  C), temperature source Oral, height 1.805 m (5' 11.06\"), weight 71.7 kg (158 lb), SpO2 98 %, not currently breastfeeding. Body mass index is 22 kg/m .  Patient Active Problem List   Diagnosis     Urethral diverticulum       Wt Readings from Last 2 Encounters:   02/06/19 71.7 kg (158 lb)   11/28/18 71.2 kg (157 lb)     BP Readings from Last 3 Encounters:   02/06/19 110/78   11/28/18 123/79   06/19/18 117/73         Current Outpatient Medications   Medication     B Complex Vitamins (VITAMIN-B COMPLEX) TABS     FLUoxetine HCl (PROZAC PO)     HYDROcodone-acetaminophen (VICODIN) 5-500 MG per tablet     IRON PO     magnesium 250 MG tablet     mesalamine (CANASA) 1000 MG Suppository     Omega-3 1000 MG CAPS     Rubber Goods (ENEMA BOTTLE) MISC     thyroid (ARMOUR THYROID) 15 MG tablet     Thyroid (LEVOTHYROXINE-LIOTHYRONINE) 15 MG TABS     TRAZODONE HCL PO     No current facility-administered medications for this visit.        Social History     Tobacco Use     Smoking status: Never Smoker     Smokeless tobacco: Never Used   Substance Use Topics     Alcohol use: Yes     Alcohol/week: 1.2 oz     Types: 1 Glasses of wine, 1 Shots of liquor per week     Comment: occasional wine or vodka and soda     Drug use: No       Health Maintenance Due   Topic Date Due     HIV SCREEN (SYSTEM ASSIGNED)  02/25/1987     LIPID SCREEN Q5 YR FEMALE (SYSTEM ASSIGNED)  02/25/2014     DTAP/TDAP/TD IMMUNIZATION (3 - Td) 07/31/2018     ZOSTER IMMUNIZATION (1 of 2) 02/25/2019       No results found for: PAP      February 6, 2019 8:35 AM  "

## 2019-02-10 PROBLEM — T78.1XXA GASTROINTESTINAL FOOD SENSITIVITY: Status: ACTIVE | Noted: 2019-02-10

## 2019-02-10 PROBLEM — R53.82 CHRONIC FATIGUE: Status: ACTIVE | Noted: 2019-02-10

## 2019-02-10 PROBLEM — M79.7 FIBROMYALGIA: Status: ACTIVE | Noted: 2019-02-10

## 2019-02-10 PROBLEM — F41.9 ANXIETY AND DEPRESSION: Status: ACTIVE | Noted: 2019-02-10

## 2019-02-10 PROBLEM — F32.A ANXIETY AND DEPRESSION: Status: ACTIVE | Noted: 2019-02-10

## 2019-02-10 PROBLEM — K59.09 CHRONIC CONSTIPATION: Status: ACTIVE | Noted: 2019-02-10

## 2019-02-22 ENCOUNTER — TRANSFERRED RECORDS (OUTPATIENT)
Dept: HEALTH INFORMATION MANAGEMENT | Facility: CLINIC | Age: 49
End: 2019-02-22

## 2019-02-25 ENCOUNTER — ANCILLARY PROCEDURE (OUTPATIENT)
Dept: GENERAL RADIOLOGY | Facility: CLINIC | Age: 49
End: 2019-02-25
Attending: FAMILY MEDICINE
Payer: COMMERCIAL

## 2019-02-25 ENCOUNTER — ANCILLARY PROCEDURE (OUTPATIENT)
Dept: CT IMAGING | Facility: CLINIC | Age: 49
End: 2019-02-25
Attending: FAMILY MEDICINE
Payer: COMMERCIAL

## 2019-02-25 ENCOUNTER — OFFICE VISIT (OUTPATIENT)
Dept: ORTHOPEDICS | Facility: CLINIC | Age: 49
End: 2019-02-25
Payer: COMMERCIAL

## 2019-02-25 ENCOUNTER — PRE VISIT (OUTPATIENT)
Dept: ORTHOPEDICS | Facility: CLINIC | Age: 49
End: 2019-02-25

## 2019-02-25 VITALS — HEIGHT: 71 IN | RESPIRATION RATE: 16 BRPM | WEIGHT: 158 LBS | BODY MASS INDEX: 22.12 KG/M2

## 2019-02-25 DIAGNOSIS — M79.671 RIGHT FOOT PAIN: Primary | ICD-10-CM

## 2019-02-25 DIAGNOSIS — S92.341A CLOSED DISPLACED FRACTURE OF FOURTH METATARSAL BONE OF RIGHT FOOT, INITIAL ENCOUNTER: ICD-10-CM

## 2019-02-25 DIAGNOSIS — S92.324A CLOSED NONDISPLACED FRACTURE OF SECOND METATARSAL BONE OF RIGHT FOOT, INITIAL ENCOUNTER: ICD-10-CM

## 2019-02-25 DIAGNOSIS — M79.671 RIGHT FOOT PAIN: ICD-10-CM

## 2019-02-25 DIAGNOSIS — S92.334A CLOSED NONDISPLACED FRACTURE OF THIRD METATARSAL BONE OF RIGHT FOOT, INITIAL ENCOUNTER: ICD-10-CM

## 2019-02-25 RX ORDER — HYDROCODONE BITARTRATE AND ACETAMINOPHEN 5; 325 MG/1; MG/1
1-2 TABLET ORAL EVERY 6 HOURS PRN
Qty: 35 TABLET | Refills: 0 | Status: SHIPPED | OUTPATIENT
Start: 2019-02-25 | End: 2019-04-08

## 2019-02-25 ASSESSMENT — MIFFLIN-ST. JEOR: SCORE: 1433.76

## 2019-02-25 NOTE — LETTER
2019     Seen today: yes    Patient:  Doris Fritz  :   1970  MRN:     [id number]  Physician: JAIME GARCIA    To whom it may concern,    Doris Fritz is under my professional care for fractures in her right foot. She needs to be in a boot and use crutches for this injury. Please allow her to go through airport security with her boot and crutches.             Jaime Garcia MD

## 2019-02-25 NOTE — PROGRESS NOTES
Subjective:   Doris Fritz is a 49 year old female who presents with a right foot fracture. The patient reports getting tangled with the dog while she was visiting her father in [place]. She doesn't recall if the foot twisted. The patient reports swelling.     She was walking her father's dog and in the hallway of his home on a wooden floor pulled her down and she had a twisting right foot injury.  She was seen at an urgent care and had radiographs.  I am reviewing those radiographs today and they demonstrate a nondisplaced proximal second and third metatarsal fractures and a mildly displaced proximal fourth metatarsal fracture of the right foot.  She has been in a postop shoe and utilizing crutches.  She has been having any difficulty trying to weight-bear.  She has not had prior fractures.    Background:   Date of injury: 2/21/2019  Duration of symptoms: 4 days  Mechanism of Injury: Acute  Intensity: 5/10   Aggravating factors: WB  Relieving Factors: Crutches NWB, tylenol   Prior Evaluation: [facility], Xrays    PAST MEDICAL, SOCIAL, SURGICAL AND FAMILY HISTORY: She  has a past medical history of Depressive disorder and Fibromyalgia.  She  has a past surgical history that includes Abdomen surgery; REDUCTION OF LARGE BREAST; Dental surgery; and Dilation and curettage.  Her family history includes Anxiety Disorder in her sister; Chronic Obstructive Pulmonary Disease in her father; Heart Disease in her paternal grandfather; Hypothyroidism in her sister; Psoriasis in her sister; Pulmonary fibrosis (age of onset: 65) in her mother.  She reports that  has never smoked. she has never used smokeless tobacco. She reports that she drinks about 1.2 oz of alcohol per week. She reports that she does not use drugs.    ALLERGIES: She is allergic to tramadol.    CURRENT MEDICATIONS: She has a current medication list which includes the following prescription(s): fluoxetine hcl,  "hydrocodone-acetaminophen, magnesium, mesalamine, trazodone, and vitamin b6.     REVIEW OF SYSTEMS: 12 point review of systems is negative except as noted above.     Exam:   Resp 16   Ht 1.805 m (5' 11.06\")   Wt 71.7 kg (158 lb)   BMI 22.00 kg/m        CONSTITUTIONAL: healthy, alert and no distress  GAIT: normal and crutches  NEUROLOGIC: Non-focal  PSYCHIATRIC: affect normal/bright and mentation appears normal.    MUSCULOSKELETAL:   Right foot: She is nontender along the shaft of the first metatarsal she is nontender along the shaft of the fifth metatarsal.  The midfoot is nontender.  She is nontender over the talar dome or talar neck or the calcaneus.  The medial and lateral malleoli are nontender.  Distal color, motor, sensory is intact.  She is discretely tender at the proximal aspect of the second third and fourth metatarsals consistent with where her fractures have occurred.  She has some dorsal foot ecchymosis consistent with approximately a 3-4-day old injury.  The Zakiya Eyad articulation is tender to palpation. She is unable to weight bear.       Assessment/Plan:   (M79.671) Right foot pain  (primary encounter diagnosis)  Plan: X-ray rt Foot G/E 3 vws*    (S92.324A) Closed nondisplaced fracture of second metatarsal bone of right foot, initial encounter  Plan: CT Foot Right w/o Contrast,         HYDROcodone-acetaminophen (NORCO) 5-325 MG         tablet    (S92.334A) Closed nondisplaced fracture of third metatarsal bone of right foot, initial encounter  Plan: CT Foot Right w/o Contrast,         HYDROcodone-acetaminophen (NORCO) 5-325 MG         tablet    (S92.341A) Closed displaced fracture of fourth metatarsal bone of right foot, initial encounter  Plan: CT Foot Right w/o Contrast,         HYDROcodone-acetaminophen (NORCO) 5-325 MG         tablet          Doris is a 49-year old female who sustained fractures of the right first second third and fourth metatarsals.  She was unable to weight-bear and so I " was concerned about an avulsion at the medial proximal aspect of the second metatarsal which would involve the Lisfranc articulation.  To that end I obtained a CT of the foot.  I reviewed this with [facility] radiology and we agree that there is no fracture of the Lisfranc articulation.  They will further review it for possible ligamentous injury.  I placed her in a long or tall boot.  She can partial weight-bear as tolerated.  She will utilize crutches.  She understands she is not to weight-bear with pain.  She is given some Norco to use primarily at night and she understands that use will be primarily as 1-2 tabs p.o. nightly and then she can repeat in the middle of the night if needed.  For the next 5-7 days she will wear her boot during the day and at night.  If she finds that she is not weightbearing or limiting her weightbearing to a significant degree that I have asked her to start aspirin for DVT prophylaxis.  She will return in 2 weeks to see me.  At that point in time we will obtain 3 views of the foot and these can be weightbearing at that time.  That will allow us to see if there is any significant gapping of the Lisfranc articulation.  She will take 5000 units of vitamin D per day while she is healing.  She will also start calcium 500 mg p.o. twice daily.  All other questions were answered for her and her .    Thank you for allowing me to participate in her care.    3 view radiographs of the right foot are obtained and demonstrate a nondisplaced second and fourth metatarsal fractures of the right foot and a minimally displaced third metatarsal fracture.  There appears to be an evulsion of the medial aspect of the proximal second metatarsal at the Lisfranc articulation.

## 2019-02-25 NOTE — LETTER
2/25/2019      RE: Doris Fritz  [address]  [facility] [address]        Subjective:   Doris Fritz is a 49 year old female who presents with a right foot fracture. The patient reports getting tangled with the dog while she was visiting her father in [place]. She doesn't recall if the foot twisted. The patient reports swelling.     She was walking her father's dog and in the hallway of his home on a wooden floor pulled her down and she had a twisting right foot injury.  She was seen at an urgent care and had radiographs.  I am reviewing those radiographs today and they demonstrate a nondisplaced proximal second and third metatarsal fractures and a mildly displaced proximal fourth metatarsal fracture of the right foot.  She has been in a postop shoe and utilizing crutches.  She has been having any difficulty trying to weight-bear.  She has not had prior fractures.    Background:   Date of injury: 2/21/2019  Duration of symptoms: 4 days  Mechanism of Injury: Acute  Intensity: 5/10   Aggravating factors: WB  Relieving Factors: Crutches NWB, tylenol   Prior Evaluation: [facility], Xrays    PAST MEDICAL, SOCIAL, SURGICAL AND FAMILY HISTORY: She  has a past medical history of Depressive disorder and Fibromyalgia.  She  has a past surgical history that includes Abdomen surgery; REDUCTION OF LARGE BREAST; Dental surgery; and Dilation and curettage.  Her family history includes Anxiety Disorder in her sister; Chronic Obstructive Pulmonary Disease in her father; Heart Disease in her paternal grandfather; Hypothyroidism in her sister; Psoriasis in her sister; Pulmonary fibrosis (age of onset: 65) in her mother.  She reports that  has never smoked. she has never used smokeless tobacco. She reports that she drinks about 1.2 oz of alcohol per week. She reports that she does not use drugs.    ALLERGIES: She is allergic to tramadol.    CURRENT MEDICATIONS: She has a current medication  "list which includes the following prescription(s): fluoxetine hcl, hydrocodone-acetaminophen, magnesium, mesalamine, trazodone, and vitamin b6.     REVIEW OF SYSTEMS: 12 point review of systems is negative except as noted above.     Exam:   Resp 16   Ht 1.805 m (5' 11.06\")   Wt 71.7 kg (158 lb)   BMI 22.00 kg/m         CONSTITUTIONAL: healthy, alert and no distress  GAIT: normal and crutches  NEUROLOGIC: Non-focal  PSYCHIATRIC: affect normal/bright and mentation appears normal.    MUSCULOSKELETAL:   Right foot: She is nontender along the shaft of the first metatarsal she is nontender along the shaft of the fifth metatarsal.  The midfoot is nontender.  She is nontender over the talar dome or talar neck or the calcaneus.  The medial and lateral malleoli are nontender.  Distal color, motor, sensory is intact.  She is discretely tender at the proximal aspect of the second third and fourth metatarsals consistent with where her fractures have occurred.  She has some dorsal foot ecchymosis consistent with approximately a 3-4-day old injury.  The Zakiya Eyad articulation is tender to palpation. She is unable to weight bear.       Assessment/Plan:   (M79.675) Right foot pain  (primary encounter diagnosis)  Plan: X-ray rt Foot G/E 3 vws*    (S92.324A) Closed nondisplaced fracture of second metatarsal bone of right foot, initial encounter  Plan: CT Foot Right w/o Contrast,         HYDROcodone-acetaminophen (NORCO) 5-325 MG         tablet    (S92.334A) Closed nondisplaced fracture of third metatarsal bone of right foot, initial encounter  Plan: CT Foot Right w/o Contrast,         HYDROcodone-acetaminophen (NORCO) 5-325 MG         tablet    (S92.341A) Closed displaced fracture of fourth metatarsal bone of right foot, initial encounter  Plan: CT Foot Right w/o Contrast,         HYDROcodone-acetaminophen (NORCO) 5-325 MG         tablet          Doris is a 49-year old female who sustained fractures of the right first second " third and fourth metatarsals.  She was unable to weight-bear and so I was concerned about an avulsion at the medial proximal aspect of the second metatarsal which would involve the Lisfranc articulation.  To that end I obtained a CT of the foot.  I reviewed this with [facility] and we agree that there is no fracture of the Lisfranc articulation.  They will further review it for possible ligamentous injury.  I placed her in a long or tall boot.  She can partial weight-bear as tolerated.  She will utilize crutches.  She understands she is not to weight-bear with pain.  She is given some Norco to use primarily at night and she understands that use will be primarily as 1-2 tabs p.o. nightly and then she can repeat in the middle of the night if needed.  For the next 5-7 days she will wear her boot during the day and at night.  If she finds that she is not weightbearing or limiting her weightbearing to a significant degree that I have asked her to start aspirin for DVT prophylaxis.  She will return in 2 weeks to see me.  At that point in time we will obtain 3 views of the foot and these can be weightbearing at that time.  That will allow us to see if there is any significant gapping of the Lisfranc articulation.  She will take 5000 units of vitamin D per day while she is healing.  She will also start calcium 500 mg p.o. twice daily.  All other questions were answered for her and her .    Thank you for allowing me to participate in her care.    3 view radiographs of the right foot are obtained and demonstrate a nondisplaced second and fourth metatarsal fractures of the right foot and a minimally displaced third metatarsal fracture.  There appears to be an evulsion of the medial aspect of the proximal second metatarsal at the Lisfranc articulation.    Fernando Landeros MD

## 2019-02-26 ENCOUNTER — DOCUMENTATION ONLY (OUTPATIENT)
Dept: CARE COORDINATION | Facility: CLINIC | Age: 49
End: 2019-02-26

## 2019-02-26 ENCOUNTER — TELEPHONE (OUTPATIENT)
Dept: ORTHOPEDICS | Facility: CLINIC | Age: 49
End: 2019-02-26

## 2019-02-26 DIAGNOSIS — S92.309A METATARSAL FRACTURE: Primary | ICD-10-CM

## 2019-02-27 NOTE — TELEPHONE ENCOUNTER
Called patient at the request of Dr. Landeros, informing her that he consulted with Dr. Coppola, our foot and ankle surgeon, and that she should be nonweightbearing in the boot instead of partial weightbearing. She was offered a rollabout to use, which she agreed to. Order was placed for [facility]. She asked about traveling in the next week and was informed that they would be able to check the rollabout at the airport gate and she should use crutches to get on and off the plane. Or if she chooses to cancel the trip, Dr. Landeros would write a letter to the airline to potential avoid change fees. She verbalized understanding and will call back with any questions or concerns.

## 2019-02-28 ENCOUNTER — TELEPHONE (OUTPATIENT)
Dept: ORTHOPEDICS | Facility: CLINIC | Age: 49
End: 2019-02-28

## 2019-02-28 NOTE — TELEPHONE ENCOUNTER
[facility] Call Center    Phone Message    May a detailed message be left on voicemail: yes    Reason for Call: Other: Pt requesting call back. Pt stated that she was told she would be getting a call from  [employer] in regards to a knee cart. Pt stated she was instructed to call the clinic if she did not receive that call     Action Taken: Message routed to:  [facility] ([facility]): sports med

## 2019-02-28 NOTE — TELEPHONE ENCOUNTER
Returned call to patient and supplied her with the phone number to [facility] to set up a rental for a roll-a-bout. She had no further questions.

## 2019-03-08 DIAGNOSIS — M79.671 RIGHT FOOT PAIN: Primary | ICD-10-CM

## 2019-03-11 ENCOUNTER — OFFICE VISIT (OUTPATIENT)
Dept: ORTHOPEDICS | Facility: CLINIC | Age: 49
End: 2019-03-11
Payer: COMMERCIAL

## 2019-03-11 ENCOUNTER — ANCILLARY PROCEDURE (OUTPATIENT)
Dept: GENERAL RADIOLOGY | Facility: CLINIC | Age: 49
End: 2019-03-11
Payer: COMMERCIAL

## 2019-03-11 DIAGNOSIS — M79.671 RIGHT FOOT PAIN: ICD-10-CM

## 2019-03-11 DIAGNOSIS — S92.314D CLOSED NONDISPLACED FRACTURE OF FIRST METATARSAL BONE OF RIGHT FOOT WITH ROUTINE HEALING, SUBSEQUENT ENCOUNTER: Primary | ICD-10-CM

## 2019-03-11 DIAGNOSIS — S92.334D CLOSED NONDISPLACED FRACTURE OF THIRD METATARSAL BONE OF RIGHT FOOT WITH ROUTINE HEALING, SUBSEQUENT ENCOUNTER: ICD-10-CM

## 2019-03-11 DIAGNOSIS — S92.341D CLOSED DISPLACED FRACTURE OF FOURTH METATARSAL BONE OF RIGHT FOOT WITH ROUTINE HEALING, SUBSEQUENT ENCOUNTER: ICD-10-CM

## 2019-03-11 DIAGNOSIS — S92.324D CLOSED NONDISPLACED FRACTURE OF SECOND METATARSAL BONE OF RIGHT FOOT WITH ROUTINE HEALING, SUBSEQUENT ENCOUNTER: ICD-10-CM

## 2019-03-11 NOTE — PROGRESS NOTES
Subjective:   Doris Fritz is a 49 year old female who follows up with right foot fracture.  She is doing well.  She has been nonweightbearing.  She has no calf or leg pain or swelling.  She has had no interval trauma.  She is taking her vitamin D supplementation and calcium as directed.    Date of injury: 2/21/19  Date last seen: 3/8/2019  Following Therapeutic Plan: Yes, boot and rollabout   Pain: Improving  Function: Improving    PAST MEDICAL, SOCIAL, SURGICAL AND FAMILY HISTORY: She  has a past medical history of Depressive disorder and Fibromyalgia.  She  has a past surgical history that includes Abdomen surgery; REDUCTION OF LARGE BREAST; Dental surgery; and Dilation and curettage.  Her family history includes Anxiety Disorder in her sister; Chronic Obstructive Pulmonary Disease in her father; Heart Disease in her paternal grandfather; Hypothyroidism in her sister; Psoriasis in her sister; Pulmonary fibrosis (age of onset: 65) in her mother.  She reports that  has never smoked. she has never used smokeless tobacco. She reports that she drinks about 1.2 oz of alcohol per week. She reports that she does not use drugs.    ALLERGIES: She is allergic to tramadol.    CURRENT MEDICATIONS: She has a current medication list which includes the following prescription(s): fluoxetine hcl, magnesium, mesalamine, trazodone, and vitamin b6.     REVIEW OF SYSTEMS: 10 point review of systems is negative except as noted above. No leg swelling, proximal or calf leg pain, no cough, no dyspnea, no hemoptysis.     Exam:   There were no vitals taken for this visit.      CONSTITUTIONAL: healthy, alert and no distress  GAIT: Roll-a-bout  NEUROLOGIC: Non-focal  PSYCHIATRIC: affect normal/bright and mentation appears normal.    MUSCULOSKELETAL:   Right foot: There is very mild dorsal lateral soft tissue swelling.  She is essentially nontender over the plantar aspect of the proximal first metatarsal.  She is nontender at the Lisfranc  articulation.  She is mildly tender at the proximal second metatarsal near her fracture site.  She is moderately tender over the proximal third and fourth metatarsals at the fracture site.  The fifth metatarsal is nontender.  Distal color, motor, and sensory are intact.    Repeat radiographs demonstrate no change in alignment of the first through fourth metatarsal fractures.       Assessment/Plan:   (S92.314D) Closed nondisplaced fracture of first metatarsal bone of right foot with routine healing, subsequent encounter  (primary encounter diagnosis)  (S92.324D) Closed nondisplaced fracture of second metatarsal bone of right foot with routine healing, subsequent encounter  (S92.334D) Closed nondisplaced fracture of third metatarsal bone of right foot with routine healing, subsequent encounter  (S92.341D) Closed displaced fracture of fourth metatarsal bone of right foot with routine healing, subsequent encounter  Comment: She is doing well.  She will continue with vitamin D and calcium supplementation.  She will continue nonweightbearing status.  She can remove the boot at night when she is in bed as long as she keeps her bed sheets loose at the foot of the bed.  She will return to see me in 4 weeks with repeat radiographs at that time.  These radiographs can be out of the boot and weightbearing.  If she has demonstrated healing then will spend the next 2 weeks weightbearing in her boot.  She is continuing with aspirin 325 mg/day as DVT prophylaxis.

## 2019-03-11 NOTE — LETTER
3/11/2019    RE: Doris Fritz  [address]  [facility] [address]      Subjective:   Doris Fritz is a 49 year old female who follows up with right foot fracture.  She is doing well.  She has been nonweightbearing.  She has no calf or leg pain or swelling.  She has had no interval trauma.  She is taking her vitamin D supplementation and calcium as directed.    Date of injury: 2/21/19  Date last seen: 3/8/2019  Following Therapeutic Plan: Yes, boot and rollabout   Pain: Improving  Function: Improving    PAST MEDICAL, SOCIAL, SURGICAL AND FAMILY HISTORY: She  has a past medical history of Depressive disorder and Fibromyalgia.  She  has a past surgical history that includes Abdomen surgery; REDUCTION OF LARGE BREAST; Dental surgery; and Dilation and curettage.  Her family history includes Anxiety Disorder in her sister; Chronic Obstructive Pulmonary Disease in her father; Heart Disease in her paternal grandfather; Hypothyroidism in her sister; Psoriasis in her sister; Pulmonary fibrosis (age of onset: 65) in her mother.  She reports that  has never smoked. she has never used smokeless tobacco. She reports that she drinks about 1.2 oz of alcohol per week. She reports that she does not use drugs.    ALLERGIES: She is allergic to tramadol.    CURRENT MEDICATIONS: She has a current medication list which includes the following prescription(s): fluoxetine hcl, magnesium, mesalamine, trazodone, and vitamin b6.     REVIEW OF SYSTEMS: 10 point review of systems is negative except as noted above. No leg swelling, proximal or calf leg pain, no cough, no dyspnea, no hemoptysis.     Exam:   There were no vitals taken for this visit.      CONSTITUTIONAL: healthy, alert and no distress  GAIT: Roll-a-bout  NEUROLOGIC: Non-focal  PSYCHIATRIC: affect normal/bright and mentation appears normal.    MUSCULOSKELETAL:   Right foot: There is very mild dorsal lateral soft tissue swelling.  She is essentially nontender over  the plantar aspect of the proximal first metatarsal.  She is nontender at the Lisfranc articulation.  She is mildly tender at the proximal second metatarsal near her fracture site.  She is moderately tender over the proximal third and fourth metatarsals at the fracture site.  The fifth metatarsal is nontender.  Distal color, motor, and sensory are intact.    Repeat radiographs demonstrate no change in alignment of the first through fourth metatarsal fractures.       Assessment/Plan:   (S92.314D) Closed nondisplaced fracture of first metatarsal bone of right foot with routine healing, subsequent encounter  (primary encounter diagnosis)  (S92.324D) Closed nondisplaced fracture of second metatarsal bone of right foot with routine healing, subsequent encounter  (S92.334D) Closed nondisplaced fracture of third metatarsal bone of right foot with routine healing, subsequent encounter  (S92.341D) Closed displaced fracture of fourth metatarsal bone of right foot with routine healing, subsequent encounter  Comment: She is doing well.  She will continue with vitamin D and calcium supplementation.  She will continue nonweightbearing status.  She can remove the boot at night when she is in bed as long as she keeps her bed sheets loose at the foot of the bed.  She will return to see me in 4 weeks with repeat radiographs at that time.  These radiographs can be out of the boot and weightbearing.  If she has demonstrated healing then will spend the next 2 weeks weightbearing in her boot.  She is continuing with aspirin 325 mg/day as DVT prophylaxis.    Fernando Landeros MD

## 2019-04-08 ENCOUNTER — ANCILLARY PROCEDURE (OUTPATIENT)
Dept: GENERAL RADIOLOGY | Facility: CLINIC | Age: 49
End: 2019-04-08
Payer: COMMERCIAL

## 2019-04-08 ENCOUNTER — OFFICE VISIT (OUTPATIENT)
Dept: ORTHOPEDICS | Facility: CLINIC | Age: 49
End: 2019-04-08
Payer: COMMERCIAL

## 2019-04-08 DIAGNOSIS — S92.324D CLOSED NONDISPLACED FRACTURE OF SECOND METATARSAL BONE OF RIGHT FOOT WITH ROUTINE HEALING, SUBSEQUENT ENCOUNTER: ICD-10-CM

## 2019-04-08 DIAGNOSIS — S92.334D CLOSED NONDISPLACED FRACTURE OF THIRD METATARSAL BONE OF RIGHT FOOT WITH ROUTINE HEALING, SUBSEQUENT ENCOUNTER: ICD-10-CM

## 2019-04-08 DIAGNOSIS — S92.314D CLOSED NONDISPLACED FRACTURE OF FIRST METATARSAL BONE OF RIGHT FOOT WITH ROUTINE HEALING, SUBSEQUENT ENCOUNTER: Primary | ICD-10-CM

## 2019-04-08 DIAGNOSIS — S92.341D CLOSED DISPLACED FRACTURE OF FOURTH METATARSAL BONE OF RIGHT FOOT WITH ROUTINE HEALING, SUBSEQUENT ENCOUNTER: ICD-10-CM

## 2019-04-08 DIAGNOSIS — S92.314D CLOSED NONDISPLACED FRACTURE OF FIRST METATARSAL BONE OF RIGHT FOOT WITH ROUTINE HEALING, SUBSEQUENT ENCOUNTER: ICD-10-CM

## 2019-04-08 LAB — DEPRECATED CALCIDIOL+CALCIFEROL SERPL-MC: 71 UG/L (ref 20–75)

## 2019-04-08 NOTE — PROGRESS NOTES
Subjective:   Doris Fritz is a 49 year old female who presents for a recheck of the right foot.  She sustained a first, second, third, and fourth proximal metatarsal fractures either nondisplaced or minimally displaced for approximately 6 weeks prior.  She has been in a boot and nonweightbearing.  She feels much improved but still does not feel that she is improved enough to be weightbearing or such that she could drive a vehicle.  She is taking calcium twice daily.  She is taking a vitamin D at 3000 international units/day and this is a supplemental dose.  She notes that in the past she was vitamin D deficient but has never taken replacement level dosing.  She has had no interval trauma.    Date of injury: 2/21/19  Date last seen: 3/11/2019  Following Therapeutic Plan: Yes NON-WB  Pain: Improving  Function: Unchanged  Interval History:      PAST MEDICAL, SOCIAL, SURGICAL AND FAMILY HISTORY: She  has a past medical history of Depressive disorder and Fibromyalgia.  She  has a past surgical history that includes Abdomen surgery; REDUCTION OF LARGE BREAST; Dental surgery; and Dilation and curettage.  Her family history includes Anxiety Disorder in her sister; Chronic Obstructive Pulmonary Disease in her father; Heart Disease in her paternal grandfather; Hypothyroidism in her sister; Psoriasis in her sister; Pulmonary fibrosis (age of onset: 65) in her mother.  She reports that she has never smoked. She has never used smokeless tobacco. She reports that she drinks about 1.2 oz of alcohol per week. She reports that she does not use drugs.    ALLERGIES: She is allergic to tramadol.    CURRENT MEDICATIONS: She has a current medication list which includes the following prescription(s): fluoxetine hcl, magnesium, mesalamine, trazodone, and vitamin b6.     REVIEW OF SYSTEMS: 10 point review of systems is negative except as noted above.     Exam:   There were no vitals taken for this visit.      CONSTITUTIONAL: healthy,  alert and no distress  SKIN: no suspicious lesions or rashes  GAIT: cam boot and scooter  NEUROLOGIC: Non-focal  PSYCHIATRIC: affect normal/bright and mentation appears normal.    MUSCULOSKELETAL:   Right foot: There is no swelling, no deformity, no erythema, skin is in good condition.  She is mildly tender at the proximal second, third, and fourth metatarsals.  She is nontender at the plantar base of the first metatarsal.  Distal color, motor, and sensory are intact.    Right foot radiographs demonstrate continued visible fractures at the second third and fourth metatarsals which are non-or minimally displaced.  We have reviewed her prior CT demonstrating an intact Lisfranc articulation as well as small avulsion at the base of the first metatarsal.  She has had no change in alignment.  There is no demonstrable healing.       Assessment/Plan:   (S92.314D) Closed nondisplaced fracture of first metatarsal bone of right foot with routine healing, subsequent encounter  (primary encounter diagnosis)  Plan: Vitamin D Deficiency    (S92.324D) Closed nondisplaced fracture of second metatarsal bone of right foot with routine healing, subsequent encounter  Plan: Vitamin D Deficiency    (S92.334D) Closed nondisplaced fracture of third metatarsal bone of right foot with routine healing, subsequent encounter  Plan: Vitamin D Deficiency    (S92.341D) Closed displaced fracture of fourth metatarsal bone of right foot with routine healing, subsequent encounter  Plan: Vitamin D Deficiency          Doris is a 49-year-old female with forefoot fractures.  She has delayed healing.  We are going to check a vitamin D level today.  She will remain in her boot and nonweightbearing for an additional 3 weeks.  When she returns in 3 weeks we will obtain 3 views of the right foot radiographs.  If she is vitamin D deficient or low normal then I am going to go ahead and replace her with 50,000 international units/week in addition to her current  supplemental dose of 3000 international units/day.  We will plan on a 12-week replacement and then repeat vitamin D testing at 14-16 weeks.  If she has normal vitamin D status then she will follow-up as scheduled in 3 weeks.      [facility] on [address]

## 2019-04-08 NOTE — LETTER
4/8/2019    RE: Doris Fritz  [address]  [facility] [place] [address]        Subjective:   Doris Fritz is a 49 year old female who presents for a recheck of the right foot.  She sustained a first, second, third, and fourth proximal metatarsal fractures either nondisplaced or minimally displaced for approximately 6 weeks prior.  She has been in a boot and nonweightbearing.  She feels much improved but still does not feel that she is improved enough to be weightbearing or such that she could drive a vehicle.  She is taking calcium twice daily.  She is taking a vitamin D at 3000 international units/day and this is a supplemental dose.  She notes that in the past she was vitamin D deficient but has never taken replacement level dosing.  She has had no interval trauma.    Date of injury: 2/21/19  Date last seen: 3/11/2019  Following Therapeutic Plan: Yes NON-WB  Pain: Improving  Function: Unchanged  Interval History:      PAST MEDICAL, SOCIAL, SURGICAL AND FAMILY HISTORY: She  has a past medical history of Depressive disorder and Fibromyalgia.  She  has a past surgical history that includes Abdomen surgery; REDUCTION OF LARGE BREAST; Dental surgery; and Dilation and curettage.  Her family history includes Anxiety Disorder in her sister; Chronic Obstructive Pulmonary Disease in her father; Heart Disease in her paternal grandfather; Hypothyroidism in her sister; Psoriasis in her sister; Pulmonary fibrosis (age of onset: 65) in her mother.  She reports that she has never smoked. She has never used smokeless tobacco. She reports that she drinks about 1.2 oz of alcohol per week. She reports that she does not use drugs.    ALLERGIES: She is allergic to tramadol.    CURRENT MEDICATIONS: She has a current medication list which includes the following prescription(s): fluoxetine hcl, magnesium, mesalamine, trazodone, and vitamin b6.     REVIEW OF SYSTEMS: 10 point review of systems is negative except as noted  above.     Exam:   There were no vitals taken for this visit.      CONSTITUTIONAL: healthy, alert and no distress  SKIN: no suspicious lesions or rashes  GAIT: cam boot and scooter  NEUROLOGIC: Non-focal  PSYCHIATRIC: affect normal/bright and mentation appears normal.    MUSCULOSKELETAL:   Right foot: There is no swelling, no deformity, no erythema, skin is in good condition.  She is mildly tender at the proximal second, third, and fourth metatarsals.  She is nontender at the plantar base of the first metatarsal.  Distal color, motor, and sensory are intact.    Right foot radiographs demonstrate continued visible fractures at the second third and fourth metatarsals which are non-or minimally displaced.  We have reviewed her prior CT demonstrating an intact Lisfranc articulation as well as small avulsion at the base of the first metatarsal.  She has had no change in alignment.  There is no demonstrable healing.       Assessment/Plan:   (S92.314D) Closed nondisplaced fracture of first metatarsal bone of right foot with routine healing, subsequent encounter  (primary encounter diagnosis)  Plan: Vitamin D Deficiency    (S92.324D) Closed nondisplaced fracture of second metatarsal bone of right foot with routine healing, subsequent encounter  Plan: Vitamin D Deficiency    (S92.334D) Closed nondisplaced fracture of third metatarsal bone of right foot with routine healing, subsequent encounter  Plan: Vitamin D Deficiency    (S92.341D) Closed displaced fracture of fourth metatarsal bone of right foot with routine healing, subsequent encounter  Plan: Vitamin D Deficiency          Doris is a 49-year-old female with forefoot fractures.  She has delayed healing.  We are going to check a vitamin D level today.  She will remain in her boot and nonweightbearing for an additional 3 weeks.  When she returns in 3 weeks we will obtain 3 views of the right foot radiographs.  If she is vitamin D deficient or low normal then I am going  to go ahead and replace her with 50,000 international units/week in addition to her current supplemental dose of 3000 international units/day.  We will plan on a 12-week replacement and then repeat vitamin D testing at 14-16 weeks.  If she has normal vitamin D status then she will follow-up as scheduled in 3 weeks.    [facility] on [address]    Fernando Landeros MD

## 2019-04-15 ENCOUNTER — OFFICE VISIT (OUTPATIENT)
Dept: FAMILY MEDICINE | Facility: CLINIC | Age: 49
End: 2019-04-15
Payer: COMMERCIAL

## 2019-04-15 VITALS
WEIGHT: 161 LBS | BODY MASS INDEX: 22.54 KG/M2 | HEIGHT: 71 IN | SYSTOLIC BLOOD PRESSURE: 131 MMHG | OXYGEN SATURATION: 95 % | HEART RATE: 100 BPM | DIASTOLIC BLOOD PRESSURE: 83 MMHG

## 2019-04-15 DIAGNOSIS — S92.301G CLOSED NONDISPLACED FRACTURE OF METATARSAL BONE OF RIGHT FOOT WITH DELAYED HEALING, UNSPECIFIED METATARSAL, SUBSEQUENT ENCOUNTER: Primary | ICD-10-CM

## 2019-04-15 DIAGNOSIS — Z78.0 MENOPAUSE: ICD-10-CM

## 2019-04-15 ASSESSMENT — MIFFLIN-ST. JEOR: SCORE: 1447.42

## 2019-04-15 NOTE — PROGRESS NOTES
Doris Fritz is a 49 year old female here for the following issues:    Closed nondisplaced fracture of metatarsal bone of right foot with delayed healing  Doris got tangled in her dogs leash and fell on 2/21/19 while in [place], fracturing 3 metatarsal bones of her right foot. . She was seen by Dr. Landeros in sports medicine on 2/25/19, and an x-ray confirmed the fractures (nondisplaced). She has been in a boot and is nonweightbearing. Her pain has improved, but xrays done on 4/8/19, showed very slow healing of her fracture. . She has been taking calcium 375mg daily and vitamin D 6000 IU daily. At her last appointment, she had repeat x-rays, which showed redemonstration of known mildly displaced fractures involving the metatarsals as described, with some ongoing healing. Dr. Landeros rechecked her vitamin D, which was normal at 70, and advised her to continue wearing her boot and to avoid bearing weight for another 3 weeks.    No hx of previous fracture. She was on calcium and vitamin D before the fracture, and has increased her dose since then, to a total of 6800 iu of vitamin D daily, and calcium 370mg daily. She is lactose and soy intolerant but she eats nuts and green leafy vegetables.     She takes a B6 supplement, vitamin K, magnesium glycinate, and a multivitamin. She has been menopausal for 2 years,, no HRT. Nonsmoker. She eats a varied diet. She has a history of chronic constipation. She notes that her feet are often cold, and wonders about a circulation problem. No family history of osteoporosis. No personal history of kidney stones.     She has a post-surgery shoe that she wears at night, and denies any trouble sleeping. She has her next appointment with Dr. Landeros in 2 weeks.    Depression and anxiety  She currently sees a psychiatrist and a counselor at [facility]. She takes fluoxetine 40mg daily and trazodone 50mg po q hs which helps with sleep. She reports that her  "recent injury has impacted her mental health.    PHQ9 score = 16  TJ 7 score = 15    Patient Active Problem List   Diagnosis     Urethral diverticulum     Chronic constipation     Anxiety and depression     Chronic fatigue     Gastrointestinal food sensitivity     Fibromyalgia       Current Outpatient Medications   Medication Sig Dispense Refill     FLUoxetine HCl (PROZAC PO) Take 40 mg by mouth daily        magnesium 250 MG tablet Take 1 tablet by mouth daily       mesalamine (CANASA) 1000 MG Suppository Place 1 suppository rectally  at bedtime as needed. 90 suppository 3     traZODone (DESYREL) 50 MG tablet Take 1 tablet (50 mg) by mouth At Bedtime 90 tablet 3     vitamin B6 (PYRIDOXINE) 250 MG tablet Take 1 tablet (250 mg) by mouth daily 90 tablet 3       Allergies   Allergen Reactions     Tramadol Other (See Comments)     serotonin syndrome        EXAM  /83   Pulse 100   Ht 1.805 m (5' 11.06\")   Wt 73 kg (161 lb)   SpO2 95%   BMI 22.42 kg/m    Gen: Alert, pleasant, NAD  Right ankle: mild tenderness with palpation over dorsum of right foot        Assessment:  (S92.301G) Closed nondisplaced fracture of metatarsal bone of right foot with delayed healing, unspecified metatarsal, subsequent encounter  (primary encounter diagnosis)  Comment: slow healing metatarsal fractures, check for underlying cause  Plan: Dexa hip/pelvis/spine*        Gave calcium and Vitamin D guidelines to patient.     Total visit time today 25 minutes.  More than 50% of the time was spent in counseling on fracture and diagnosis of osteopenia.        Lenora Do MD  Internal Medicine    I, Gifty Pardo, am serving as a scribe to document services personally performed by Dr. Lenora Do, based on data collection and the provider's statements to me. Dr. Do has reviewed, edited, and approved the above note.     "

## 2019-04-15 NOTE — NURSING NOTE
"49 year old  Chief Complaint   Patient presents with     RECHECK     pt wants to discuss her injuries to her foot and the bones not healing correctly.       Blood pressure 131/83, pulse 100, height 1.805 m (5' 11.06\"), weight 73 kg (161 lb), SpO2 95 %, not currently breastfeeding. Body mass index is 22.42 kg/m .  Patient Active Problem List   Diagnosis     Urethral diverticulum     Chronic constipation     Anxiety and depression     Chronic fatigue     Gastrointestinal food sensitivity     Fibromyalgia       Wt Readings from Last 2 Encounters:   04/15/19 73 kg (161 lb)   02/25/19 71.7 kg (158 lb)     BP Readings from Last 3 Encounters:   04/15/19 131/83   02/06/19 110/78   11/28/18 123/79         Current Outpatient Medications   Medication     FLUoxetine HCl (PROZAC PO)     magnesium 250 MG tablet     mesalamine (CANASA) 1000 MG Suppository     traZODone (DESYREL) 50 MG tablet     vitamin B6 (PYRIDOXINE) 250 MG tablet     No current facility-administered medications for this visit.        Social History     Tobacco Use     Smoking status: Never Smoker     Smokeless tobacco: Never Used   Substance Use Topics     Alcohol use: Yes     Alcohol/week: 1.2 oz     Types: 1 Glasses of wine, 1 Shots of liquor per week     Comment: occasional wine or vodka and soda     Drug use: No       Health Maintenance Due   Topic Date Due     DEPRESSION ACTION PLAN  02/25/1987     HIV SCREEN (SYSTEM ASSIGNED)  02/25/1987     PHQ-9 Q6 MONTHS  02/25/1987     LIPID SCREEN Q5 YR FEMALE (SYSTEM ASSIGNED)  02/25/2014     ZOSTER IMMUNIZATION (1 of 2) 02/25/2019       No results found for: PAP      April 15, 2019 3:57 PM  "

## 2019-04-17 ENCOUNTER — MYC MEDICAL ADVICE (OUTPATIENT)
Dept: FAMILY MEDICINE | Facility: CLINIC | Age: 49
End: 2019-04-17

## 2019-04-26 ENCOUNTER — ANCILLARY PROCEDURE (OUTPATIENT)
Dept: BONE DENSITY | Facility: CLINIC | Age: 49
End: 2019-04-26
Attending: INTERNAL MEDICINE
Payer: COMMERCIAL

## 2019-04-26 DIAGNOSIS — Z78.0 MENOPAUSE: ICD-10-CM

## 2019-04-29 ENCOUNTER — OFFICE VISIT (OUTPATIENT)
Dept: ORTHOPEDICS | Facility: CLINIC | Age: 49
End: 2019-04-29
Payer: COMMERCIAL

## 2019-04-29 VITALS — BODY MASS INDEX: 22.54 KG/M2 | RESPIRATION RATE: 16 BRPM | WEIGHT: 161 LBS | HEIGHT: 71 IN

## 2019-04-29 DIAGNOSIS — S92.314D CLOSED NONDISPLACED FRACTURE OF FIRST METATARSAL BONE OF RIGHT FOOT WITH ROUTINE HEALING, SUBSEQUENT ENCOUNTER: Primary | ICD-10-CM

## 2019-04-29 DIAGNOSIS — S92.324D CLOSED NONDISPLACED FRACTURE OF SECOND METATARSAL BONE OF RIGHT FOOT WITH ROUTINE HEALING, SUBSEQUENT ENCOUNTER: ICD-10-CM

## 2019-04-29 DIAGNOSIS — S92.334D CLOSED NONDISPLACED FRACTURE OF THIRD METATARSAL BONE OF RIGHT FOOT WITH ROUTINE HEALING, SUBSEQUENT ENCOUNTER: ICD-10-CM

## 2019-04-29 DIAGNOSIS — S92.341D CLOSED DISPLACED FRACTURE OF FOURTH METATARSAL BONE OF RIGHT FOOT WITH ROUTINE HEALING, SUBSEQUENT ENCOUNTER: ICD-10-CM

## 2019-04-29 ASSESSMENT — MIFFLIN-ST. JEOR: SCORE: 1447.37

## 2019-04-29 NOTE — PROGRESS NOTES
" Subjective:   Doris Fritz is a 49 year old female who is here following closed nondisplaced fracture of her right 1-4 metatarsals.  Her vitamin D level is normal.  She continues taking calcium and vitamin D supplements.  She had a DEXA scan recently and I have reviewed that with her.  I do not have the database for her Z scores but she will be on the borderline between normal and osteopenia and explained that to her.  She will continue with her calcium and vitamin D supplementation is doing.  She has been continuing with the scooter and her boot use.  She has had no interval trauma.    Date of injury: 2/21/19  Date last seen: 4/8/2019  Following Therapeutic Plan: Yes Cam boot and scooter  Pain: Improving  Function: NA  Interval History:      PAST MEDICAL, SOCIAL, SURGICAL AND FAMILY HISTORY: She  has a past medical history of Depressive disorder and Fibromyalgia.  She  has a past surgical history that includes Abdomen surgery; REDUCTION OF LARGE BREAST; Dental surgery; and Dilation and curettage.  Her family history includes Anxiety Disorder in her sister; Chronic Obstructive Pulmonary Disease in her father; Heart Disease in her paternal grandfather; Hypothyroidism in her sister; Psoriasis in her sister; Pulmonary fibrosis (age of onset: 65) in her mother.  She reports that she has never smoked. She has never used smokeless tobacco. She reports that she drinks about 1.2 oz of alcohol per week. She reports that she does not use drugs.    ALLERGIES: She is allergic to tramadol.    CURRENT MEDICATIONS: She has a current medication list which includes the following prescription(s): fluoxetine hcl, magnesium, mesalamine, trazodone, and vitamin b6.     REVIEW OF SYSTEMS: 12 point review of systems is negative except as noted above.     Exam:   Resp 16   Ht 1.805 m (5' 11.06\")   Wt 73 kg (161 lb)   BMI 22.42 kg/m        CONSTITUTIONAL: healthy, alert and no distress  SKIN: no suspicious lesions or " rashes  NEUROLOGIC: Non-focal  PSYCHIATRIC: affect normal/bright and mentation appears normal.    MUSCULOSKELETAL:   Right foot: She is mildly tender to palpation on the plantar aspect of the first metatarsal and across the dorsal aspect of the second third and fourth metatarsals.  There is no significant swelling.  There is no other bony tenderness.  She does note that she has some paresthesias over the dorsal aspect of the second third and fourth toes and distal forefoot.  Otherwise distal color, motor and other sensory is intact.       Assessment/Plan:   Doris is a 49-year-old female who sustained nondisplaced fractures of the first through fourth metatarsals.  She is somewhat prolonged in her recovery.  She will transition to protected weightbearing in a boot at this time.  She will continue with her calcium and vitamin D.  She will return in 3 to 4 weeks and will get repeat 3 views of the right foot at that time and I anticipate she will be able to transition to a cast shoe or other stiff soled shoe at that time.  We did discuss that in order to be eligible for a bone stimulator she will be need to be 90 days out from her fracture with continued fracture lines on her radiographs.    Thank you for allowing me to participate in her care.

## 2019-04-29 NOTE — LETTER
"  4/29/2019      RE: Doris Fritz  [address]  [facility] [address]        Subjective:   Doris Fritz is a 49 year old female who is here following closed nondisplaced fracture of her right 1-4 metatarsals.  Her vitamin D level is normal.  She continues taking calcium and vitamin D supplements.  She had a DEXA scan recently and I have reviewed that with her.  I do not have the database for her Z scores but she will be on the borderline between normal and osteopenia and explained that to her.  She will continue with her calcium and vitamin D supplementation is doing.  She has been continuing with the scooter and her boot use.  She has had no interval trauma.    Date of injury: 2/21/19  Date last seen: 4/8/2019  Following Therapeutic Plan: Yes Cam boot and scooter  Pain: Improving  Function: NA  Interval History:      PAST MEDICAL, SOCIAL, SURGICAL AND FAMILY HISTORY: She  has a past medical history of Depressive disorder and Fibromyalgia.  She  has a past surgical history that includes Abdomen surgery; REDUCTION OF LARGE BREAST; Dental surgery; and Dilation and curettage.  Her family history includes Anxiety Disorder in her sister; Chronic Obstructive Pulmonary Disease in her father; Heart Disease in her paternal grandfather; Hypothyroidism in her sister; Psoriasis in her sister; Pulmonary fibrosis (age of onset: 65) in her mother.  She reports that she has never smoked. She has never used smokeless tobacco. She reports that she drinks about 1.2 oz of alcohol per week. She reports that she does not use drugs.    ALLERGIES: She is allergic to tramadol.    CURRENT MEDICATIONS: She has a current medication list which includes the following prescription(s): fluoxetine hcl, magnesium, mesalamine, trazodone, and vitamin b6.     REVIEW OF SYSTEMS: 12 point review of systems is negative except as noted above.     Exam:   Resp 16   Ht 1.805 m (5' 11.06\")   Wt 73 kg (161 lb)   BMI 22.42 kg/m       "   CONSTITUTIONAL: healthy, alert and no distress  SKIN: no suspicious lesions or rashes  NEUROLOGIC: Non-focal  PSYCHIATRIC: affect normal/bright and mentation appears normal.    MUSCULOSKELETAL:   Right foot: She is mildly tender to palpation on the plantar aspect of the first metatarsal and across the dorsal aspect of the second third and fourth metatarsals.  There is no significant swelling.  There is no other bony tenderness.  She does note that she has some paresthesias over the dorsal aspect of the second third and fourth toes and distal forefoot.  Otherwise distal color, motor and other sensory is intact.       Assessment/Plan:   Doris is a 49-year-old female who sustained nondisplaced fractures of the first through fourth metatarsals.  She is somewhat prolonged in her recovery.  She will transition to protected weightbearing in a boot at this time.  She will continue with her calcium and vitamin D.  She will return in 3 to 4 weeks and will get repeat 3 views of the right foot at that time and I anticipate she will be able to transition to a cast shoe or other stiff soled shoe at that time.  We did discuss that in order to be eligible for a bone stimulator she will be need to be 90 days out from her fracture with continued fracture lines on her radiographs.    Thank you for allowing me to participate in her care.    Fernando Landeros MD

## 2019-05-03 ASSESSMENT — ANXIETY QUESTIONNAIRES
7. FEELING AFRAID AS IF SOMETHING AWFUL MIGHT HAPPEN: MORE THAN HALF THE DAYS
2. NOT BEING ABLE TO STOP OR CONTROL WORRYING: MORE THAN HALF THE DAYS
6. BECOMING EASILY ANNOYED OR IRRITABLE: MORE THAN HALF THE DAYS
IF YOU CHECKED OFF ANY PROBLEMS ON THIS QUESTIONNAIRE, HOW DIFFICULT HAVE THESE PROBLEMS MADE IT FOR YOU TO DO YOUR WORK, TAKE CARE OF THINGS AT HOME, OR GET ALONG WITH OTHER PEOPLE: SOMEWHAT DIFFICULT
5. BEING SO RESTLESS THAT IT IS HARD TO SIT STILL: MORE THAN HALF THE DAYS
3. WORRYING TOO MUCH ABOUT DIFFERENT THINGS: NEARLY EVERY DAY
1. FEELING NERVOUS, ANXIOUS, OR ON EDGE: MORE THAN HALF THE DAYS
GAD7 TOTAL SCORE: 15

## 2019-05-03 ASSESSMENT — PATIENT HEALTH QUESTIONNAIRE - PHQ9
5. POOR APPETITE OR OVEREATING: MORE THAN HALF THE DAYS
SUM OF ALL RESPONSES TO PHQ QUESTIONS 1-9: 16

## 2019-05-04 ASSESSMENT — ANXIETY QUESTIONNAIRES: GAD7 TOTAL SCORE: 15

## 2019-05-22 DIAGNOSIS — M79.671 RIGHT FOOT PAIN: Primary | ICD-10-CM

## 2019-05-23 ENCOUNTER — ANCILLARY PROCEDURE (OUTPATIENT)
Dept: GENERAL RADIOLOGY | Facility: CLINIC | Age: 49
End: 2019-05-23
Payer: COMMERCIAL

## 2019-05-23 ENCOUNTER — OFFICE VISIT (OUTPATIENT)
Dept: ORTHOPEDICS | Facility: CLINIC | Age: 49
End: 2019-05-23
Payer: COMMERCIAL

## 2019-05-23 VITALS — WEIGHT: 161 LBS | BODY MASS INDEX: 22.54 KG/M2 | HEIGHT: 71 IN

## 2019-05-23 DIAGNOSIS — S92.334K CLOSED NONDISPLACED FRACTURE OF THIRD METATARSAL BONE OF RIGHT FOOT WITH NONUNION, SUBSEQUENT ENCOUNTER: ICD-10-CM

## 2019-05-23 DIAGNOSIS — S92.314K CLOSED NONDISPLACED FRACTURE OF FIRST METATARSAL BONE OF RIGHT FOOT WITH NONUNION, SUBSEQUENT ENCOUNTER: Primary | ICD-10-CM

## 2019-05-23 DIAGNOSIS — M79.671 RIGHT FOOT PAIN: ICD-10-CM

## 2019-05-23 DIAGNOSIS — S92.341D CLOSED DISPLACED FRACTURE OF FOURTH METATARSAL BONE OF RIGHT FOOT WITH ROUTINE HEALING, SUBSEQUENT ENCOUNTER: ICD-10-CM

## 2019-05-23 DIAGNOSIS — S92.324K CLOSED NONDISPLACED FRACTURE OF SECOND METATARSAL BONE OF RIGHT FOOT WITH NONUNION, SUBSEQUENT ENCOUNTER: ICD-10-CM

## 2019-05-23 ASSESSMENT — MIFFLIN-ST. JEOR: SCORE: 1446.42

## 2019-05-23 NOTE — PROGRESS NOTES
Subjective:   Doris Fritz is a 49 year old female who is returning for her Right foot fx follow up.    Date of injury: 2/21/19  Date last seen: 5/22/2019  Following Therapeutic Plan: Yes wearing CAM boot  Pain: Improved, but it fluctuates  Function: Improving    She states that she is continued to have some discomfort and has remained in her boot.  She has had some discomfort with weightbearing activity in the boot so she has also maintained her rollabout.  She is taking her calcium and vitamin D.  She has had no interval trauma.    Repeat radiographs today demonstrate continued fracture line visibility.    On examination of the right foot, she has no swelling or deformity but continues to have tenderness at the base of the first metatarsal as well as the proximal second and third metatarsals.    Assessment: Doris is a 49-year-old female on calcium and vitamin D who has nondisplaced fractures of the first, second, third metatarsals.  She is greater than 3 months from the time of her initial radiographs in her injury.  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    Plan: I am going to order a bone stimulator for her use.  She will use this for a full 6 weeks and then return to see me with follow-up radiographs of the right foot including 3 views.  I am going to extend her use of her rollabout to 3 months from today.  I am going to place her in a short boot due to way her and breakdown of her tall boot.  She will continue with her calcium and vitamin D is doing.

## 2019-05-23 NOTE — LETTER
5/23/2019      RE: Doris Fritz  [address]  [facility] [address]        Subjective:   Doris Fritz is a 49 year old female who is returning for her Right foot fx follow up.    Date of injury: 2/21/19  Date last seen: 5/22/2019  Following Therapeutic Plan: Yes wearing CAM boot  Pain: Improved, but it fluctuates  Function: Improving    She states that she is continued to have some discomfort and has remained in her boot.  She has had some discomfort with weightbearing activity in the boot so she has also maintained her rollabout.  She is taking her calcium and vitamin D.  She has had no interval trauma.    Repeat radiographs today demonstrate continued fracture line visibility.    On examination of the right foot, she has no swelling or deformity but continues to have tenderness at the base of the first metatarsal as well as the proximal second and third metatarsals.    Assessment: Doris is a 49-year-old female on calcium and vitamin D who has nondisplaced fractures of the first, second, third metatarsals.  She is greater than 3 months from the time of her initial radiographs in her injury.  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    Plan: I am going to order a bone stimulator for her use.  She will use this for a full 6 weeks and then return to see me with follow-up radiographs of the right foot including 3 views.  I am going to extend her use of her rollabout to 3 months from today.  I am going to place her in a short boot due to way her and breakdown of her tall boot.  She will continue with her calcium and vitamin D is doing.      Fernando Landeros MD

## 2019-05-30 ENCOUNTER — MEDICAL CORRESPONDENCE (OUTPATIENT)
Dept: HEALTH INFORMATION MANAGEMENT | Facility: CLINIC | Age: 49
End: 2019-05-30

## 2019-06-04 ENCOUNTER — MYC MEDICAL ADVICE (OUTPATIENT)
Dept: ORTHOPEDICS | Facility: CLINIC | Age: 49
End: 2019-06-04

## 2019-06-04 DIAGNOSIS — S92.314K CLOSED NONDISPLACED FRACTURE OF FIRST METATARSAL BONE OF RIGHT FOOT WITH NONUNION, SUBSEQUENT ENCOUNTER: Primary | ICD-10-CM

## 2019-07-08 ENCOUNTER — ANCILLARY PROCEDURE (OUTPATIENT)
Dept: GENERAL RADIOLOGY | Facility: CLINIC | Age: 49
End: 2019-07-08
Attending: FAMILY MEDICINE
Payer: COMMERCIAL

## 2019-07-08 ENCOUNTER — OFFICE VISIT (OUTPATIENT)
Dept: ORTHOPEDICS | Facility: CLINIC | Age: 49
End: 2019-07-08
Payer: COMMERCIAL

## 2019-07-08 VITALS — DIASTOLIC BLOOD PRESSURE: 76 MMHG | SYSTOLIC BLOOD PRESSURE: 138 MMHG | HEART RATE: 74 BPM | RESPIRATION RATE: 16 BRPM

## 2019-07-08 DIAGNOSIS — S92.314K CLOSED NONDISPLACED FRACTURE OF FIRST METATARSAL BONE OF RIGHT FOOT WITH NONUNION, SUBSEQUENT ENCOUNTER: ICD-10-CM

## 2019-07-08 DIAGNOSIS — S92.324K CLOSED NONDISPLACED FRACTURE OF SECOND METATARSAL BONE OF RIGHT FOOT WITH NONUNION, SUBSEQUENT ENCOUNTER: ICD-10-CM

## 2019-07-08 DIAGNOSIS — S92.314K CLOSED NONDISPLACED FRACTURE OF FIRST METATARSAL BONE OF RIGHT FOOT WITH NONUNION, SUBSEQUENT ENCOUNTER: Primary | ICD-10-CM

## 2019-07-08 DIAGNOSIS — S92.334K CLOSED NONDISPLACED FRACTURE OF THIRD METATARSAL BONE OF RIGHT FOOT WITH NONUNION, SUBSEQUENT ENCOUNTER: ICD-10-CM

## 2019-07-08 DIAGNOSIS — S92.341D CLOSED DISPLACED FRACTURE OF FOURTH METATARSAL BONE OF RIGHT FOOT WITH ROUTINE HEALING, SUBSEQUENT ENCOUNTER: ICD-10-CM

## 2019-07-08 RX ORDER — ASPIRIN 81 MG
2 TABLET, DELAYED RELEASE (ENTERIC COATED) ORAL
COMMUNITY

## 2019-07-08 NOTE — LETTER
7/8/2019      RE: Doris Fritz  [address]  [facility] [address]        Subjective:   Doris Fritz is a 49 year old female who is here following up on a closed nondisplaced fractures of the right first, second, third and fourth metatarsals.  She has been using a bone stimulator for the past 27 days.  She continues in her short cam walker.  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  She has been on her feet anywhere from 2 to 6 hours/day.  She was improving very nicely and in the last week has noted some increase in aching in the right foot.  She has had no interval trauma.    Repeat radiographs are obtained today and they demonstrate interval healing of the second third and fourth metatarsal fractures.    Date of injury: 2/21/19  Date last seen: 5/23/2019  Following Therapeutic Plan: Yes Boot and Bone stim   Pain: Unchanged  Function: Unchanged  Interval History:      PAST MEDICAL, SOCIAL, SURGICAL AND FAMILY HISTORY: She  has a past medical history of Depressive disorder and Fibromyalgia.  She  has a past surgical history that includes Abdomen surgery; REDUCTION OF LARGE BREAST; Dental surgery; and Dilation and curettage.  Her family history includes Anxiety Disorder in her sister; Chronic Obstructive Pulmonary Disease in her father; Heart Disease in her paternal grandfather; Hypothyroidism in her sister; Psoriasis in her sister; Pulmonary fibrosis (age of onset: 65) in her mother.  She reports that she has never smoked. She has never used smokeless tobacco. She reports that she drinks about 1.2 oz of alcohol per week. She reports that she does not use drugs.    ALLERGIES: She is allergic to tramadol.    CURRENT MEDICATIONS: She has a current medication list which includes the following prescription(s): fluoxetine hcl, magnesium, mesalamine, multivitamin, therapeutic with minerals, order for dme, order for  dme, trazodone, and vitamin b6.     REVIEW OF SYSTEMS: 6 point review of systems is negative except as noted above.     Exam:   There were no vitals taken for this visit.      CONSTITUTIONAL: healthy, alert and no distress  GAIT: normal  NEUROLOGIC: Non-focal  PSYCHIATRIC: affect normal/bright and mentation appears normal.    MUSCULOSKELETAL: Right foot: There is minimal tenderness over the proximal second metatarsal but otherwise is nontender at the plantar aspect of the proximal first metatarsal, the proximal third or fourth metatarsals.  Distal color, motor, and sensory are intact.       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  She will continue use of her boot when she is at work or walking her dog.  At other times she can come out into her orthotic and supportive athletic wear.  She will continue use the bone stimulator for another 30 days and will follow-up with me at that time.  If her symptoms continue to improve she will not obtain radiographs at follow-up.  If her symptoms are worsening at that time then she will obtain radiographs at follow-up.          Fernando Landeros MD

## 2019-07-08 NOTE — PROGRESS NOTES
Subjective:   Doris Fritz is a 49 year old female who is here following up on a closed nondisplaced fractures of the right first, second, third and fourth metatarsals.  She has been using a bone stimulator for the past 27 days.  She continues in her short cam walker.  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  She has been on her feet anywhere from 2 to 6 hours/day.  She was improving very nicely and in the last week has noted some increase in aching in the right foot.  She has had no interval trauma.    Repeat radiographs are obtained today and they demonstrate interval healing of the second third and fourth metatarsal fractures.    Date of injury: 2/21/19  Date last seen: 5/23/2019  Following Therapeutic Plan: Yes Boot and Bone stim   Pain: Unchanged  Function: Unchanged  Interval History:      PAST MEDICAL, SOCIAL, SURGICAL AND FAMILY HISTORY: She  has a past medical history of Depressive disorder and Fibromyalgia.  She  has a past surgical history that includes Abdomen surgery; REDUCTION OF LARGE BREAST; Dental surgery; and Dilation and curettage.  Her family history includes Anxiety Disorder in her sister; Chronic Obstructive Pulmonary Disease in her father; Heart Disease in her paternal grandfather; Hypothyroidism in her sister; Psoriasis in her sister; Pulmonary fibrosis (age of onset: 65) in her mother.  She reports that she has never smoked. She has never used smokeless tobacco. She reports that she drinks about 1.2 oz of alcohol per week. She reports that she does not use drugs.    ALLERGIES: She is allergic to tramadol.    CURRENT MEDICATIONS: She has a current medication list which includes the following prescription(s): fluoxetine hcl, magnesium, mesalamine, multivitamin, therapeutic with minerals, order for dme, order for dme, trazodone, and vitamin b6.     REVIEW OF SYSTEMS: 6 point review of systems is  negative except as noted above.     Exam:   There were no vitals taken for this visit.      CONSTITUTIONAL: healthy, alert and no distress  GAIT: normal  NEUROLOGIC: Non-focal  PSYCHIATRIC: affect normal/bright and mentation appears normal.    MUSCULOSKELETAL: Right foot: There is minimal tenderness over the proximal second metatarsal but otherwise is nontender at the plantar aspect of the proximal first metatarsal, the proximal third or fourth metatarsals.  Distal color, motor, and sensory are intact.       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  She will continue use of her boot when she is at work or walking her dog.  At other times she can come out into her orthotic and supportive athletic wear.  She will continue use the bone stimulator for another 30 days and will follow-up with me at that time.  If her symptoms continue to improve she will not obtain radiographs at follow-up.  If her symptoms are worsening at that time then she will obtain radiographs at follow-up.

## 2019-08-03 ENCOUNTER — OFFICE VISIT (OUTPATIENT)
Dept: FAMILY MEDICINE | Facility: CLINIC | Age: 49
End: 2019-08-03
Payer: COMMERCIAL

## 2019-08-03 VITALS
RESPIRATION RATE: 16 BRPM | OXYGEN SATURATION: 97 % | TEMPERATURE: 97.8 F | SYSTOLIC BLOOD PRESSURE: 112 MMHG | HEIGHT: 70 IN | WEIGHT: 159.5 LBS | HEART RATE: 77 BPM | BODY MASS INDEX: 22.83 KG/M2 | DIASTOLIC BLOOD PRESSURE: 76 MMHG

## 2019-08-03 DIAGNOSIS — R53.82 CHRONIC FATIGUE: Primary | ICD-10-CM

## 2019-08-03 DIAGNOSIS — F32.A ANXIETY AND DEPRESSION: ICD-10-CM

## 2019-08-03 DIAGNOSIS — R14.0 BLOATING: ICD-10-CM

## 2019-08-03 DIAGNOSIS — F41.9 ANXIETY AND DEPRESSION: ICD-10-CM

## 2019-08-03 PROBLEM — R63.5 WEIGHT GAIN: Status: ACTIVE | Noted: 2019-08-03

## 2019-08-03 LAB
% GRANULOCYTES: 56.3 %G (ref 40–75)
ERYTHROCYTE [DISTWIDTH] IN BLOOD BY AUTOMATED COUNT: 12.5 %
GRANULOCYTES #: 1.9 K/UL (ref 1.6–8.3)
HBA1C MFR BLD: 5.1 % (ref 4.1–5.7)
HCT VFR BLD AUTO: 45.2 % (ref 35–47)
HEMOGLOBIN: 14.6 G/DL (ref 11.7–15.7)
LYMPHOCYTES # BLD AUTO: 0.9 K/UL (ref 0.8–5.3)
LYMPHOCYTES NFR BLD AUTO: 28.6 %L (ref 20–48)
MCH RBC QN AUTO: 30.7 PG (ref 26.5–35)
MCHC RBC AUTO-ENTMCNC: 32.3 G/DL (ref 32–36)
MCV RBC AUTO: 95.2 FL (ref 78–100)
MID #: 0.5 K/UL (ref 0–2.2)
MID %: 15.1 %M (ref 0–20)
PLATELET # BLD AUTO: 203 K/UL (ref 150–450)
PROLACTIN SERPL-MCNC: 12 UG/L (ref 3–27)
RBC # BLD AUTO: 4.75 M/UL (ref 3.8–5.2)
WBC # BLD AUTO: 3.3 K/UL (ref 4–11)

## 2019-08-03 RX ORDER — LORAZEPAM 0.5 MG/1
TABLET ORAL
Qty: 20 TABLET | Refills: 0 | COMMUNITY
Start: 2019-08-03 | End: 2022-11-11

## 2019-08-03 ASSESSMENT — MIFFLIN-ST. JEOR: SCORE: 1423.74

## 2019-08-03 NOTE — NURSING NOTE
"49 year old  Chief Complaint   Patient presents with     Weight Problem     Pt has concenr for metabolism issues due to poor appetite and abnormal weight changes. May be related to recent fasting.        Blood pressure 112/76, pulse 77, temperature 97.8  F (36.6  C), temperature source Oral, resp. rate 16, height 1.778 m (5' 10\"), weight 72.3 kg (159 lb 8 oz), SpO2 97 %, not currently breastfeeding. Body mass index is 22.89 kg/m .  Patient Active Problem List   Diagnosis     Urethral diverticulum     Chronic constipation     Anxiety and depression     Chronic fatigue     Gastrointestinal food sensitivity     Fibromyalgia     Weight gain       Wt Readings from Last 3 Encounters:   08/03/19 72.3 kg (159 lb 8 oz)   05/23/19 73 kg (161 lb)   04/29/19 73 kg (161 lb)     BP Readings from Last 6 Encounters:   08/03/19 112/76   07/08/19 138/76   04/15/19 131/83   02/06/19 110/78   11/28/18 123/79   06/19/18 117/73       Current Outpatient Medications   Medication     FLUoxetine HCl (PROZAC PO)     magnesium 250 MG tablet     mesalamine (CANASA) 1000 MG Suppository     multivitamin, therapeutic with minerals (THERA-VIT-M) TABS tablet     order for DME     order for DME     traZODone (DESYREL) 50 MG tablet     vitamin B6 (PYRIDOXINE) 250 MG tablet     No current facility-administered medications for this visit.        Social History     Tobacco Use     Smoking status: Never Smoker     Smokeless tobacco: Never Used   Substance Use Topics     Alcohol use: Yes     Alcohol/week: 1.2 oz     Types: 1 Glasses of wine, 1 Shots of liquor per week     Comment: occasional wine or vodka and soda     Drug use: No       Health Maintenance Due   Topic Date Due     DEPRESSION ACTION PLAN  1969     HIV SCREENING  02/25/1984     MEDICARE ANNUAL WELLNESS VISIT  02/25/1987     LIPID  02/25/2014     ZOSTER IMMUNIZATION (1 of 2) 02/25/2019       No results found for: LIZBETH Martin, HOLDEN  August 3, 2019 8:05 AM    "

## 2019-08-03 NOTE — PROGRESS NOTES
"Doris Fritz is a 49 year old female here for the following issues:    Weight concerns  oDris is a 48 yo woman concerned she has \"metabolism issues\". She has poor appetite and weight has been stable but she feels like she is \"getting bigger\", not fitting into her clothes. She  recently fasted for 117 hours and thinks this changed her metabolism and made her gain weight. Symptoms have been ongoing x 3 yr, worsening in past 9 months. She is in menopause, no menses x 2 yr. She continues to exercise. She has some associated fatigue. No night sweats.     No vision changes, no headaches.     Family hx   Hyperthyroid-mom  Hypothyroid, sister and MGM  No adrenal issues    Wt Readings from Last 2 Encounters:   08/03/19 72.3 kg (159 lb 8 oz)   05/23/19 73 kg (161 lb)       Anxiety and depression  Doris is treated for anxiety and depression. She follows with a psychiatric nurse, at the [facility]. She takes fluoxetine 20mg daily and trazodone 50mg po q hs. She takes lorazepam sparingly.    PHQ9 score = 17 (no SI)  TJ 7 score = 13      Hx of foot fracture  Doris suffered fractures to 3 metatarsal bones of her right foot in February 2019. She is following with orthopedics. She has delayed healing so now she is using a bone stimulating machine. Pain is improving. She is able to bear weight. Pain is minimal.    Patient Active Problem List   Diagnosis     Urethral diverticulum     Chronic constipation     Anxiety and depression     Chronic fatigue     Gastrointestinal food sensitivity     Fibromyalgia       Current Outpatient Medications   Medication Sig Dispense Refill     FLUoxetine HCl (PROZAC PO) Take 40 mg by mouth daily        magnesium 250 MG tablet Take 1 tablet by mouth daily       mesalamine (CANASA) 1000 MG Suppository Place 1 suppository rectally  at bedtime as needed. 90 suppository 3     multivitamin, therapeutic with minerals (THERA-VIT-M) TABS tablet Take 2 tablets by mouth       " "order for DME Roll-A-Bout Walker. Patient can use for ambulation after foot fracture. Allow until the end of June 1 Units 0     order for DME Roll-A-Bout Walker. Patient can use for ambulation for 3 more months 1 Units 0     traZODone (DESYREL) 50 MG tablet Take 1 tablet (50 mg) by mouth At Bedtime 90 tablet 3     vitamin B6 (PYRIDOXINE) 250 MG tablet Take 1 tablet (250 mg) by mouth daily 90 tablet 3       Allergies   Allergen Reactions     Tramadol Other (See Comments)     serotonin syndrome        EXAM  /76 (BP Location: Right arm, Patient Position: Sitting, Cuff Size: Adult Regular)   Pulse 77   Temp 97.8  F (36.6  C) (Oral)   Resp 16   Ht 1.778 m (5' 10\")   Wt 72.3 kg (159 lb 8 oz)   LMP  (LMP Unknown)   SpO2 97%   BMI 22.89 kg/m    Gen: Alert, pleasant, NAD  HEENT:  Conjunctiva nl, TM normal bilaterally, OP clear, no posterior erythema  Neck: no LAD or TM  COR: S1,S2, no murmur  Lungs: CTA bilaterally, no rhonchi, wheezes or rales  Abdomen: Soft, non tender, normal bowel sounds, no HSM or mass  Ext: no peripheral edema, pulses full      Assessment:  (R53.82) Chronic fatigue  (primary encounter diagnosis)  Comment: etiology is unclear, likely multifactorial.   Plan: T3 reverse, Prolactin, Hemoglobin A1c ([facility]), CBC with Diff Plt ([facility]),         ENDOCRINOLOGY ADULT REFERRAL, Lipid Profile,         Basic Metabolic Panel ([facility]), TSH with         free T4 reflex,         Will look for reversible causes. Pt was requesting referral to see an endocrinologist.     (F41.9,  F32.9) Anxiety and depression  Comment: currently followed by mental health provider  Plan: continue current medications    (R14.0) Bloating  Comment: worsening over past 3 months, review of outside pelvic US (9/2018) indicates a left sided lesion, possibly uterine fibroid. Follow up was recommended in 3-6 months. No current bleeding she is postmenopausal.  Plan: US Pelvic W/Transvaginal*,         Recommend repeating " pelvic US and if lesion is growing will need to see ob/gyn again    Lenora Do MD  Internal Medicine/Pediatrics

## 2019-08-05 DIAGNOSIS — R53.82 CHRONIC FATIGUE: ICD-10-CM

## 2019-08-05 LAB
BUN SERPL-MCNC: 7 MG/DL (ref 7–30)
CALCIUM SERPL-MCNC: 8.7 MG/DL (ref 8.5–10.4)
CHLORIDE SERPLBLD-SCNC: 98 MMOL/L (ref 94–109)
CHOLEST SERPL-MCNC: 193 MG/DL
CO2 SERPL-SCNC: 29 MMOL/L (ref 20–32)
CREAT SERPL-MCNC: 0.7 MG/DL (ref 0.6–1.3)
EGFR CALCULATED (BLACK REFERENCE): 113.9
EGFR CALCULATED (NON BLACK REFERENCE): 94.1
GLUCOSE SERPL-MCNC: 121 MG/DL (ref 60–99)
HDLC SERPL-MCNC: 85 MG/DL
LDLC SERPL CALC-MCNC: 81 MG/DL
NONHDLC SERPL-MCNC: 108 MG/DL
POTASSIUM SERPL-SCNC: 3.6 MMOL/L (ref 3.4–5.3)
SODIUM SERPL-SCNC: 139 MMOL/L (ref 137.3–146.3)
T4 FREE SERPL-MCNC: 1.02 NG/DL (ref 0.76–1.46)
TRIGL SERPL-MCNC: 132 MG/DL
TSH SERPL DL<=0.005 MIU/L-ACNC: 0.28 MU/L (ref 0.4–4)

## 2019-08-07 LAB — T3REVERSE SERPL-MCNC: 17.1 NG/DL (ref 9–27)

## 2019-08-15 ASSESSMENT — ANXIETY QUESTIONNAIRES
1. FEELING NERVOUS, ANXIOUS, OR ON EDGE: MORE THAN HALF THE DAYS
6. BECOMING EASILY ANNOYED OR IRRITABLE: MORE THAN HALF THE DAYS
GAD7 TOTAL SCORE: 12
IF YOU CHECKED OFF ANY PROBLEMS ON THIS QUESTIONNAIRE, HOW DIFFICULT HAVE THESE PROBLEMS MADE IT FOR YOU TO DO YOUR WORK, TAKE CARE OF THINGS AT HOME, OR GET ALONG WITH OTHER PEOPLE: SOMEWHAT DIFFICULT
5. BEING SO RESTLESS THAT IT IS HARD TO SIT STILL: MORE THAN HALF THE DAYS
2. NOT BEING ABLE TO STOP OR CONTROL WORRYING: MORE THAN HALF THE DAYS
7. FEELING AFRAID AS IF SOMETHING AWFUL MIGHT HAPPEN: NOT AT ALL
3. WORRYING TOO MUCH ABOUT DIFFERENT THINGS: MORE THAN HALF THE DAYS

## 2019-08-15 ASSESSMENT — PATIENT HEALTH QUESTIONNAIRE - PHQ9: 5. POOR APPETITE OR OVEREATING: MORE THAN HALF THE DAYS

## 2019-08-16 ASSESSMENT — ANXIETY QUESTIONNAIRES: GAD7 TOTAL SCORE: 12

## 2019-08-19 ENCOUNTER — OFFICE VISIT (OUTPATIENT)
Dept: ORTHOPEDICS | Facility: CLINIC | Age: 49
End: 2019-08-19
Payer: COMMERCIAL

## 2019-08-19 VITALS — WEIGHT: 159 LBS | HEIGHT: 70 IN | BODY MASS INDEX: 22.76 KG/M2

## 2019-08-19 DIAGNOSIS — S92.334K CLOSED NONDISPLACED FRACTURE OF THIRD METATARSAL BONE OF RIGHT FOOT WITH NONUNION, SUBSEQUENT ENCOUNTER: ICD-10-CM

## 2019-08-19 DIAGNOSIS — S92.314K CLOSED NONDISPLACED FRACTURE OF FIRST METATARSAL BONE OF RIGHT FOOT WITH NONUNION, SUBSEQUENT ENCOUNTER: Primary | ICD-10-CM

## 2019-08-19 DIAGNOSIS — S92.324K CLOSED NONDISPLACED FRACTURE OF SECOND METATARSAL BONE OF RIGHT FOOT WITH NONUNION, SUBSEQUENT ENCOUNTER: ICD-10-CM

## 2019-08-19 DIAGNOSIS — S92.341D CLOSED DISPLACED FRACTURE OF FOURTH METATARSAL BONE OF RIGHT FOOT WITH ROUTINE HEALING, SUBSEQUENT ENCOUNTER: ICD-10-CM

## 2019-08-19 ASSESSMENT — PAIN SCALES - GENERAL: PAINLEVEL: NO PAIN (0)

## 2019-08-19 ASSESSMENT — MIFFLIN-ST. JEOR: SCORE: 1421.47

## 2019-08-19 NOTE — PROGRESS NOTES
August 19, 2019: Doris Fritz is a 49 year old female who is seen in f/u up for a right foot fracture.  She has used her bone stimulator for about 70 consecutive days.  She is having no pain in the right foot.  She is ambulating and walking for exercise without difficulty.    On examination: She is in no acute distress.  Gait is normal.  Right foot: She is nontender over the proximal second, third, fourth metatarsals.  There is no midfoot tenderness or laxity.  There is no foot swelling.  Distal color, motor, and sensory are intact.    Assessment: Status post transverse fracture of the right second, third, and fourth metatarsals with prior evidence of interval healing on her radiographs of July 2019.  She is doing remarkably well.  She will continue with use of her bone stimulator through 90 days and then can discontinue it.  She can return to her regular activity which will involve yoga and walking.  We did discuss local pain that can occur with weather change and so she is prepared for this possibly occurring in September October timeframe.  She will follow-up on a as needed basis.

## 2019-08-19 NOTE — LETTER
8/19/2019      RE: Doris Fritz  [address]  [facility] [address]       August 19, 2019: Doris Fritz is a 49 year old female who is seen in f/u up for a right foot fracture.  She has used her bone stimulator for about 70 consecutive days.  She is having no pain in the right foot.  She is ambulating and walking for exercise without difficulty.    On examination: She is in no acute distress.  Gait is normal.  Right foot: She is nontender over the proximal second, third, fourth metatarsals.  There is no midfoot tenderness or laxity.  There is no foot swelling.  Distal color, motor, and sensory are intact.    Assessment: Status post transverse fracture of the right second, third, and fourth metatarsals with prior evidence of interval healing on her radiographs of July 2019.  She is doing remarkably well.  She will continue with use of her bone stimulator through 90 days and then can discontinue it.  She can return to her regular activity which will involve yoga and walking.  We did discuss local pain that can occur with weather change and so she is prepared for this possibly occurring in September October timeframe.  She will follow-up on a as needed basis.    Fernando Landeros MD

## 2019-08-25 NOTE — TELEPHONE ENCOUNTER
RECORDS RECEIVED FROM: INTERNAL   DATE RECEIVED: 10/9/19   NOTES (FOR ALL VISITS) STATUS DETAILS   OFFICE NOTES from referring provider N/A 8/3/19   OFFICE NOTES from other specialist N/A    ED NOTES N/A    OPERATIVE REPORT  (thyroid, pituitary, adrenal, parathyroid) N/A    MEDICATION LIST Internal    IMAGING      DEXASCAN Internal 4/26/19   MRI (BRAIN) N/A    XR (Chest) N/A    CT (HEAD/NECK/CHEST/ABDOMEN) N/A    NUCLEAR  N/A    ULTRASOUND (HEAD/NECK) N/A    LABS     DIABETES: HBGA1C, CREATININE, FASTING LIPIDS, MICROALBUMIN URINE, POTASSIUM, TSH, T4    THYROID: TSH, T4, CBC, THYRODLONULIN, TOTAL T3, FREE T4, CALCITONIN, CEA Internal   8/3/19  8/24/19

## 2019-08-28 ENCOUNTER — MYC REFILL (OUTPATIENT)
Dept: FAMILY MEDICINE | Facility: CLINIC | Age: 49
End: 2019-08-28

## 2019-09-05 DIAGNOSIS — F41.9 ANXIETY AND DEPRESSION: Primary | ICD-10-CM

## 2019-09-05 DIAGNOSIS — F32.A ANXIETY AND DEPRESSION: Primary | ICD-10-CM

## 2019-09-05 NOTE — TELEPHONE ENCOUNTER
Re-sending fluoxetine script to [facility] per pt request.  India Kelley RN  [facility]

## 2019-10-08 ASSESSMENT — ENCOUNTER SYMPTOMS
DEPRESSION: 1
HEARTBURN: 0
PANIC: 0
BLOOD IN STOOL: 0
DECREASED CONCENTRATION: 1
CONSTIPATION: 1
RECTAL PAIN: 0
ALTERED TEMPERATURE REGULATION: 0
VOMITING: 0
POLYDIPSIA: 0
BRUISES/BLEEDS EASILY: 1
STIFFNESS: 0
FATIGUE: 1
NAUSEA: 0
BACK PAIN: 0
SNORES LOUDLY: 0
BOWEL INCONTINENCE: 0
NIGHT SWEATS: 1
COUGH DISTURBING SLEEP: 1
SKIN CHANGES: 0
POOR WOUND HEALING: 1
INSOMNIA: 0
DECREASED APPETITE: 1
CHILLS: 1
SPUTUM PRODUCTION: 0
JAUNDICE: 0
WEIGHT GAIN: 1
DYSPNEA ON EXERTION: 0
SHORTNESS OF BREATH: 0
NERVOUS/ANXIOUS: 1
WEIGHT LOSS: 0
ABDOMINAL PAIN: 0
NAIL CHANGES: 0
MYALGIAS: 1
POLYPHAGIA: 0
FEVER: 0
SWOLLEN GLANDS: 0
INCREASED ENERGY: 1
MUSCLE CRAMPS: 0
ARTHRALGIAS: 0
WHEEZING: 0
MUSCLE WEAKNESS: 1
BLOATING: 1
HALLUCINATIONS: 0
POSTURAL DYSPNEA: 0
DIARRHEA: 0
COUGH: 0
HEMOPTYSIS: 0
NECK PAIN: 1
JOINT SWELLING: 0

## 2019-10-09 ENCOUNTER — PRE VISIT (OUTPATIENT)
Dept: ENDOCRINOLOGY | Facility: CLINIC | Age: 49
End: 2019-10-09

## 2019-10-09 ENCOUNTER — OFFICE VISIT (OUTPATIENT)
Dept: ENDOCRINOLOGY | Facility: CLINIC | Age: 49
End: 2019-10-09
Attending: INTERNAL MEDICINE
Payer: COMMERCIAL

## 2019-10-09 VITALS
HEART RATE: 76 BPM | HEIGHT: 70 IN | BODY MASS INDEX: 23.82 KG/M2 | WEIGHT: 166.4 LBS | DIASTOLIC BLOOD PRESSURE: 79 MMHG | SYSTOLIC BLOOD PRESSURE: 114 MMHG

## 2019-10-09 DIAGNOSIS — R14.0 BLOATING: Primary | ICD-10-CM

## 2019-10-09 DIAGNOSIS — R53.82 CHRONIC FATIGUE: ICD-10-CM

## 2019-10-09 DIAGNOSIS — R68.89 COLD FEELING: ICD-10-CM

## 2019-10-09 DIAGNOSIS — L85.3 DRY SKIN: ICD-10-CM

## 2019-10-09 DIAGNOSIS — L60.3 BRITTLE NAILS: ICD-10-CM

## 2019-10-09 ASSESSMENT — PAIN SCALES - GENERAL: PAINLEVEL: NO PAIN (0)

## 2019-10-09 ASSESSMENT — MIFFLIN-ST. JEOR: SCORE: 1455.04

## 2019-10-09 ASSESSMENT — PATIENT HEALTH QUESTIONNAIRE - PHQ9: SUM OF ALL RESPONSES TO PHQ QUESTIONS 1-9: 12

## 2019-10-09 NOTE — LETTER
10/9/2019       RE: Doris Fritz  [address]  [facility] [address]     Dear Colleague,    Thank you for referring your patient, Doris Fritz, to the [facility] ENDOCRINOLOGY at [facility]. Please see a copy of my visit note below.    Reason for visit/consult: Chronic fatigue and bloating    Primary care provider: Lenora Do    HPI:  48 yo female with anxiety and depression seen at consult request of Dr. SHADE Do for eval/mgmt of chronic fatigue and bloating and due to patient's concerns of inability to lose weight and problems with metabolism (see note of 8/3/19). Dr. Do started the w/u w/ labs including thyroid, A1c, lipids, CBC w/ diff plts, BMP. Of those results, TSH was slightly suppressed and FT4 was normal, including prolactin.  LMP 2.5 yrs ago, is post-menopausal.  3.5 yr ago she is used to feeling fatigued and has fibromyalgia, depression got worse, went to MD regularly during this time and told them she was more fatigued and worn out and is hoping there is a reason for this. Some weight gain. Has changed diet, is more active. Broke foot in Feb 2019 and it wasn't healing and used bone stimulator, was not vit D def. She has a broken pinky toe but has not been evaluated yet, she got it caught on something while sitting down.  Denies osteoporosis, had a bone scan left hip osteopenia.   She thinks she has gained 7 lbs since seeing Dr. Do, she did 117 hours fast and did not get hungry. She wonders if she trained her body to store fat.  Her depression is so much worse because she could not walk on her legs x 4 months after her fracture. Now that she is more active she is gaining more weight.  Constipation and food intolerances. Drier skin, and brittle nails over past few years. Body temperature changes feels chilled over past few years and colder feet and colder hands.    Past Medical/Surgical History:  Past Medical History:   Diagnosis  Date     Depressive disorder      Fibromyalgia      Past Surgical History:   Procedure Laterality Date     ABDOMEN SURGERY      laparoscopy check ovaries     AS REDUCTION OF LARGE BREAST       DENTAL SURGERY       DILATION AND CURETTAGE      miscarriage       Allergies:  Allergies   Allergen Reactions     Tramadol Other (See Comments)     serotonin syndrome       Current Medications     Current Outpatient Medications:      FLUoxetine (PROZAC) 20 MG capsule, Take one daily (Patient taking differently: 40 mg Take one daily), Disp: 90 capsule, Rfl: 0     LORazepam (ATIVAN) 0.5 MG tablet, Take one po bid prn anxiety, Disp: 20 tablet, Rfl: 0     magnesium 250 MG tablet, Take 1 tablet by mouth daily, Disp: , Rfl:      mesalamine (CANASA) 1000 MG Suppository, Place 1 suppository rectally  at bedtime as needed., Disp: 90 suppository, Rfl: 3     multivitamin, therapeutic with minerals (THERA-VIT-M) TABS tablet, Take 2 tablets by mouth, Disp: , Rfl:      traZODone (DESYREL) 50 MG tablet, Take 1 tablet (50 mg) by mouth At Bedtime, Disp: 90 tablet, Rfl: 3     vitamin B6 (PYRIDOXINE) 250 MG tablet, Take 1 tablet (250 mg) by mouth daily, Disp: 90 tablet, Rfl: 3    Family History:  Family History   Problem Relation Age of Onset     Pulmonary fibrosis Mother 65     Chronic Obstructive Pulmonary Disease Father         Tobacco     Anxiety Disorder Sister      Psoriasis Sister      Hypothyroidism Sister      Heart Disease Paternal Grandfather      Additional Family hx   Hyperthyroid-mom  Hypothyroid, sister and MGM  No adrenal issues    Social History:  Social History     Tobacco Use     Smoking status: Never Smoker     Smokeless tobacco: Never Used   Substance Use Topics     Alcohol use: Yes     Alcohol/week: 2.0 standard drinks     Types: 1 Glasses of wine, 1 Shots of liquor per week     Comment: occasional wine or vodka and soda     Additional Social History: 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    ROS:  negative except as  "mentioned in HPI and in list below    Exam  not currently breastfeeding.   /79   Pulse 76   Ht 1.778 m (5' 10\")   Wt 75.5 kg (166 lb 6.4 oz)   LMP  (LMP Unknown)   BMI 23.88 kg/m     Gen: well appearing, nad, pleasant and conversant  HEENT: anicteric, EOMI, no proptosis or lid lag, no goiter or LAD  Neuro: A&Ox3, no tremor on outstretched arms    Labs/Imaging    ENDO ADRENAL LABS Latest Ref Rng & Units 8/5/2019 6/19/2018   POTASSIUM 3.4 - 5.3 mmol/L 3.6 4.2   .3 - 146.3 mmol/L 139.0 140   ENDO THYROID LABS-UMP Latest Ref Rng & Units 8/5/2019    TSH 0.40 - 4.00 mU/L 0.28 (L)    T4 FREE 0.76 - 1.46 ng/dL 1.02      Assessment and Plan  Chronic fatigue associated with strong FH of hypothyroidism in a patient with many somatic complaints that may or may not be related to endocrine dysfunction. Her TSH was slightly suppressed with normal FT4 in early August 2019. This could be related to sedative Trazadone and prn Ativan and could be non-specfic or could be related to having underlying thyroid disease. Endocrinologists do not all agree on current nomenclature of subclinical hyper/hypothyroidism and furthermore, these types of diagnoses cannot be made on a single data point, so more information is needed. Recommend the labs below for now.    Metabolism - we calculated REE (approx 1400), provided raad level & counseling - BMI is 23, so is not clinically relevant for further mgmt at this time. Weight gain she experienced was likely due to being physically inactive during her fracture healing time and inter-scale variability, etc. She recently hurt her pinky toe, so she should be formally evaluated for this to see if it will chronically affect ambulation.    For depression and anxiety, continue to see psych professionals she has relationship with. We did discuss pharmacologic etiologies for fatigue and T1/2 of medications on her list above and including hydroxyzine which she has not started taking " yet.    Labs: see orders for thyroid, adrenal    RTC: TBD based on results    Terra Araujo MD, MPH  Attending Physician  Diabetes/Endocrinology/Metabolism    Time:  60 min spent on chart review, evaluation, management, counseling, education, & motivational interviewing with greater than 50% of the total time was spent on counseling and coordinating care      Again, thank you for allowing me to participate in the care of your patient.      Sincerely,    Terra Araujo MD

## 2019-10-09 NOTE — NURSING NOTE
Depression Response    Patient completed the PHQ-9 assessment for depression and scored >9? Yes  Question 9 on the PHQ-9 was positive for suicidality? No  Is the patient already receiving treatment for depression? No  Patient would like to speak with behavioral health team ([facility] only)? No    I personally notified the following: clinic nurse NICOLAS     Behavioral Health/Social Work Contact Information     [facility]  Jeffery Baron MA, LMFT  Lead Behavioral Health Clinician  Phone: [phone]  [facility] Pager: [phone]    Non-[facility]  [facility] On-Call   Pager: [phone]

## 2019-10-09 NOTE — PROGRESS NOTES
Reason for visit/consult: Chronic fatigue and bloating    Primary care provider: Lenora Do    HPI:  48 yo female with anxiety and depression seen at consult request of Dr. SHADE Do for eval/mgmt of chronic fatigue and bloating and due to patient's concerns of inability to lose weight and problems with metabolism (see note of 8/3/19). Dr. Do started the w/u w/ labs including thyroid, A1c, lipids, CBC w/ diff plts, BMP. Of those results, TSH was slightly suppressed and FT4 was normal, including prolactin.  LMP 2.5 yrs ago, is post-menopausal.  3.5 yr ago she is used to feeling fatigued and has fibromyalgia, depression got worse, went to MD regularly during this time and told them she was more fatigued and worn out and is hoping there is a reason for this. Some weight gain. Has changed diet, is more active. Broke foot in Feb 2019 and it wasn't healing and used bone stimulator, was not vit D def. She has a broken pinky toe but has not been evaluated yet, she got it caught on something while sitting down.  Denies osteoporosis, had a bone scan left hip osteopenia.   She thinks she has gained 7 lbs since seeing Dr. Do, she did 117 hours fast and did not get hungry. She wonders if she trained her body to store fat.  Her depression is so much worse because she could not walk on her legs x 4 months after her fracture. Now that she is more active she is gaining more weight.  Constipation and food intolerances. Drier skin, and brittle nails over past few years. Body temperature changes feels chilled over past few years and colder feet and colder hands.    Past Medical/Surgical History:  Past Medical History:   Diagnosis Date     Depressive disorder      Fibromyalgia      Past Surgical History:   Procedure Laterality Date     ABDOMEN SURGERY      laparoscopy check ovaries     AS REDUCTION OF LARGE BREAST       DENTAL SURGERY       DILATION AND CURETTAGE      miscarriage       Allergies:  Allergies  "  Allergen Reactions     Tramadol Other (See Comments)     serotonin syndrome       Current Medications     Current Outpatient Medications:      FLUoxetine (PROZAC) 20 MG capsule, Take one daily (Patient taking differently: 40 mg Take one daily), Disp: 90 capsule, Rfl: 0     LORazepam (ATIVAN) 0.5 MG tablet, Take one po bid prn anxiety, Disp: 20 tablet, Rfl: 0     magnesium 250 MG tablet, Take 1 tablet by mouth daily, Disp: , Rfl:      mesalamine (CANASA) 1000 MG Suppository, Place 1 suppository rectally  at bedtime as needed., Disp: 90 suppository, Rfl: 3     multivitamin, therapeutic with minerals (THERA-VIT-M) TABS tablet, Take 2 tablets by mouth, Disp: , Rfl:      traZODone (DESYREL) 50 MG tablet, Take 1 tablet (50 mg) by mouth At Bedtime, Disp: 90 tablet, Rfl: 3     vitamin B6 (PYRIDOXINE) 250 MG tablet, Take 1 tablet (250 mg) by mouth daily, Disp: 90 tablet, Rfl: 3    Family History:  Family History   Problem Relation Age of Onset     Pulmonary fibrosis Mother 65     Chronic Obstructive Pulmonary Disease Father         Tobacco     Anxiety Disorder Sister      Psoriasis Sister      Hypothyroidism Sister      Heart Disease Paternal Grandfather      Additional Family hx   Hyperthyroid-mom  Hypothyroid, sister and MGM  No adrenal issues    Social History:  Social History     Tobacco Use     Smoking status: Never Smoker     Smokeless tobacco: Never Used   Substance Use Topics     Alcohol use: Yes     Alcohol/week: 2.0 standard drinks     Types: 1 Glasses of wine, 1 Shots of liquor per week     Comment: occasional wine or vodka and soda     Additional Social History: 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    ROS:  negative except as mentioned in HPI and in list below    Exam  not currently breastfeeding.   /79   Pulse 76   Ht 1.778 m (5' 10\")   Wt 75.5 kg (166 lb 6.4 oz)   LMP  (LMP Unknown)   BMI 23.88 kg/m    Gen: well appearing, nad, pleasant and conversant  HEENT: anicteric, EOMI, no proptosis or lid " lag, no goiter or LAD  Neuro: A&Ox3, no tremor on outstretched arms    Labs/Imaging    ENDO ADRENAL LABS Latest Ref Rng & Units 8/5/2019 6/19/2018   POTASSIUM 3.4 - 5.3 mmol/L 3.6 4.2   .3 - 146.3 mmol/L 139.0 140   ENDO THYROID LABS-UMP Latest Ref Rng & Units 8/5/2019    TSH 0.40 - 4.00 mU/L 0.28 (L)    T4 FREE 0.76 - 1.46 ng/dL 1.02      Assessment and Plan  Chronic fatigue associated with strong FH of hypothyroidism in a patient with many somatic complaints that may or may not be related to endocrine dysfunction. Her TSH was slightly suppressed with normal FT4 in early August 2019. This could be related to sedative Trazadone and prn Ativan and could be non-specfic or could be related to having underlying thyroid disease. Endocrinologists do not all agree on current nomenclature of subclinical hyper/hypothyroidism and furthermore, these types of diagnoses cannot be made on a single data point, so more information is needed. Recommend the labs below for now.    Metabolism - we calculated REE (approx 1400), provided raad level & counseling - BMI is 23, so is not clinically relevant for further mgmt at this time. Weight gain she experienced was likely due to being physically inactive during her fracture healing time and inter-scale variability, etc. She recently hurt her pinky toe, so she should be formally evaluated for this to see if it will chronically affect ambulation.    For depression and anxiety, continue to see psych professionals she has relationship with. We did discuss pharmacologic etiologies for fatigue and T1/2 of medications on her list above and including hydroxyzine which she has not started taking yet.    Labs: see orders for thyroid, adrenal    RTC: TBD based on results    Terra Araujo MD, MPH  Attending Physician  Diabetes/Endocrinology/Metabolism    Time:  60 min spent on chart review, evaluation, management, counseling, education, & motivational interviewing with greater than 50%  of the total time was spent on counseling and coordinating care        Answers for HPI/ROS submitted by the patient on 10/8/2019   General Symptoms: Yes  Skin Symptoms: Yes  HENT Symptoms: No  EYE SYMPTOMS: No  HEART SYMPTOMS: No  LUNG SYMPTOMS: Yes  INTESTINAL SYMPTOMS: Yes  URINARY SYMPTOMS: No  GYNECOLOGIC SYMPTOMS: No  BREAST SYMPTOMS: No  SKELETAL SYMPTOMS: Yes  BLOOD SYMPTOMS: Yes  NERVOUS SYSTEM SYMPTOMS: No  MENTAL HEALTH SYMPTOMS: Yes  Fever: No  Loss of appetite: Yes  Weight loss: No  Weight gain: Yes  Fatigue: Yes  Night sweats: Yes  Chills: Yes  Increased stress: Yes  Excessive hunger: No  Excessive thirst: No  Feeling hot or cold when others believe the temperature is normal: No  Loss of height: No  Post-operative complications: No  Surgical site pain: No  Hallucinations: No  Change in or Loss of Energy: Yes  Hyperactivity: No  Confusion: Yes  Changes in hair: No  Changes in moles/birth marks: No  Itching: No  Rashes: No  Changes in nails: No  Acne: No  Hair in places you don't want it: No  Change in facial hair: No  Warts: Yes  Non-healing sores: Yes  Scarring: No  Flaking of skin: No  Color changes of hands/feet in cold : No  Sun sensitivity: No  Skin thickening: No  Cough: No  Sputum or phlegm: No  Coughing up blood: No  Difficulty breating or shortness of breath: No  Snoring: No  Wheezing: No  Difficulty breathing on exertion: No  Nighttime Cough: Yes  Difficulty breathing when lying flat: No  Heart burn or indigestion: No  Nausea: No  Vomiting: No  Abdominal pain: No  Bloating: Yes  Constipation: Yes  Diarrhea: No  Blood in stool: No  Black stools: No  Rectal or Anal pain: No  Fecal incontinence: No  Yellowing of skin or eyes: No  Vomit with blood: No  Change in stools: No  Back pain: No  Muscle aches: Yes  Neck pain: Yes  Swollen joints: No  Joint pain: No  Bone pain: No  Muscle cramps: No  Muscle weakness: Yes  Joint stiffness: No  Bone fracture: Yes  Anemia: No  Swollen glands: No  Easy bleeding  or bruising: Yes  Edema or swelling: No  Nervous or Anxious: Yes  Depression: Yes  Trouble sleeping: No  Trouble thinking or concentrating: Yes  Mood changes: Yes  Panic attacks: No

## 2019-10-09 NOTE — LETTER
[facility]:  PHQ-9 Screening Note  SITUATION/BACKGROUND                                                    Doris Fritz is a 49 year old female who completed the PHQ-9 assessment for depression and Score is >9.    Onset of symptoms:  Patient declines to give any information to the RN.  She  does see someone for mental health already {ONSET:734117}  Trigger:NA  Recent related events:NA  Prior history of suicide attempt or self harm:NA  Risk FactorsNA  History of depression or mental illness: YES  Medications reviewed:YS,      ASSESSMENT      A. Are any of the following present?      Suicidal thoughts with a plan and means to carry out the plan?    Intent to harm others    Altered mental status: confusion, delusional, psychotic NA   B. Are any of the following present?      Suicidal thoughts without a plan or means to carry out the plan    New onset of delusional ideas    Past inpatient admission for depression    New onset and recent change or addition of new medication NA   C. Are any of the following present?      Previous suicide attempts    Depression interfering with ability to work or function    Loss of appetite and eating poorly    Abrupt cessation of drugs (OTC or RX), alcohol or caffeine    Drug or alcohol abuse NA   D. Are several of the following present?      Difficulty concentrating    Difficulty sleeping    Reduced interest in sexual activity or impotency    Irregular or absent menstruation    No interest in activity    Change in interpersonal relationships    Increased use/abuse of alcohol or drugs    Pregnant or recent child birth    Recent major life change    History of depression NA         PLAN      Home Care Instructions:   NA    Report the following to your PCP:   NA     Seek emergency care immediately if any of the following occur:NA patient refused as she  Has a mental Health provider already     BEHAVIORAL HEALTH TEAMS      [facility] - Behavioral Health Team    [facility] Pager:  [phone]    [place]  - Behavioral Health Team    Pager number: [phone]    Referral to Behavioral Health    BEHAVIORAL / SPIRITUAL HE[facility] [[id number]}    RESOURCES      NA patient declined as she has her own  Provider .     Leann Olivas, RN Leann Olivas, RN on 10/9/2019 at 3:05 PM          Copyright 2016 [facility]

## 2019-11-07 ENCOUNTER — HOSPITAL ENCOUNTER (OUTPATIENT)
Dept: LAB | Facility: CLINIC | Age: 49
Discharge: HOME | End: 2019-11-07
Attending: INTERNAL MEDICINE | Admitting: INTERNAL MEDICINE
Payer: COMMERCIAL

## 2019-11-07 DIAGNOSIS — R14.0 BLOATING: ICD-10-CM

## 2019-11-07 DIAGNOSIS — L85.3 DRY SKIN: ICD-10-CM

## 2019-11-07 DIAGNOSIS — L60.3 BRITTLE NAILS: ICD-10-CM

## 2019-11-07 DIAGNOSIS — R53.82 CHRONIC FATIGUE: ICD-10-CM

## 2019-11-07 DIAGNOSIS — R68.89 COLD FEELING: ICD-10-CM

## 2019-11-07 LAB
CORTIS SERPL-MCNC: 19.6 UG/DL (ref 4–22)
T4 FREE SERPL-MCNC: 1.17 NG/DL (ref 0.76–1.46)
TSH SERPL DL<=0.005 MIU/L-ACNC: 1.58 MU/L (ref 0.4–4)

## 2019-11-07 PROCEDURE — 84445 ASSAY OF TSI GLOBULIN: CPT | Performed by: INTERNAL MEDICINE

## 2019-11-07 PROCEDURE — 86800 THYROGLOBULIN ANTIBODY: CPT | Performed by: INTERNAL MEDICINE

## 2019-11-07 PROCEDURE — 82024 ASSAY OF ACTH: CPT | Performed by: INTERNAL MEDICINE

## 2019-11-07 PROCEDURE — 82533 TOTAL CORTISOL: CPT | Performed by: INTERNAL MEDICINE

## 2019-11-07 PROCEDURE — 36415 COLL VENOUS BLD VENIPUNCTURE: CPT | Performed by: INTERNAL MEDICINE

## 2019-11-07 PROCEDURE — 84439 ASSAY OF FREE THYROXINE: CPT | Performed by: INTERNAL MEDICINE

## 2019-11-07 PROCEDURE — 86376 MICROSOMAL ANTIBODY EACH: CPT | Performed by: INTERNAL MEDICINE

## 2019-11-07 PROCEDURE — 84443 ASSAY THYROID STIM HORMONE: CPT | Performed by: INTERNAL MEDICINE

## 2019-11-08 ENCOUNTER — TELEPHONE (OUTPATIENT)
Dept: ENDOCRINOLOGY | Facility: CLINIC | Age: 49
End: 2019-11-08

## 2019-11-08 LAB
ACTH PLAS-MCNC: 19 PG/ML
THYROGLOB AB SERPL IA-ACNC: <20 IU/ML (ref 0–40)
THYROPEROXIDASE AB SERPL-ACNC: <10 IU/ML

## 2019-11-08 NOTE — TELEPHONE ENCOUNTER
Lab results sent to provider for review upon return to clinic 11/11/2019.   Bailee Stewart RN on 11/8/2019 at 11:54 AM

## 2019-11-11 LAB — TSI SER-ACNC: <1 TSI INDEX

## 2019-12-04 DIAGNOSIS — F32.A ANXIETY AND DEPRESSION: ICD-10-CM

## 2019-12-04 DIAGNOSIS — F41.9 ANXIETY AND DEPRESSION: ICD-10-CM

## 2019-12-04 NOTE — TELEPHONE ENCOUNTER
Last time prescribed: 9/5/19 , 90 tabs/caps x 0 refills  Last office visit: 8/3/19  Next appointment: No Future Appointment Scheduled    Prescription approved per [facility] Refill Protocol.  India Kelley RN  [facility]

## 2019-12-17 ENCOUNTER — OFFICE VISIT (OUTPATIENT)
Dept: FAMILY MEDICINE | Facility: CLINIC | Age: 49
End: 2019-12-17
Payer: COMMERCIAL

## 2019-12-17 VITALS
RESPIRATION RATE: 15 BRPM | HEART RATE: 69 BPM | TEMPERATURE: 97.9 F | DIASTOLIC BLOOD PRESSURE: 76 MMHG | BODY MASS INDEX: 23.26 KG/M2 | HEIGHT: 70 IN | SYSTOLIC BLOOD PRESSURE: 117 MMHG | WEIGHT: 162.5 LBS | OXYGEN SATURATION: 100 %

## 2019-12-17 DIAGNOSIS — R05.9 COUGH: Primary | ICD-10-CM

## 2019-12-17 RX ORDER — CODEINE PHOSPHATE AND GUAIFENESIN 10; 100 MG/5ML; MG/5ML
1-2 SOLUTION ORAL EVERY 4 HOURS PRN
Qty: 236 ML | Refills: 0 | Status: SHIPPED | OUTPATIENT
Start: 2019-12-17 | End: 2021-11-01

## 2019-12-17 ASSESSMENT — MIFFLIN-ST. JEOR: SCORE: 1437.35

## 2019-12-17 NOTE — NURSING NOTE
"49 year old  Chief Complaint   Patient presents with     Nasal Congestion     x 2 weeks     Facial Pressure     Eye Itching Both Eyes     more in the right     Cough     Headache       Blood pressure 117/76, pulse 69, temperature 97.9  F (36.6  C), temperature source Oral, resp. rate 15, height 1.778 m (5' 10\"), weight 73.7 kg (162 lb 8 oz), SpO2 100 %, not currently breastfeeding. Body mass index is 23.32 kg/m .  Patient Active Problem List   Diagnosis     Urethral diverticulum     Chronic constipation     Anxiety and depression     Chronic fatigue     Gastrointestinal food sensitivity     Fibromyalgia     Weight gain       Wt Readings from Last 2 Encounters:   12/17/19 73.7 kg (162 lb 8 oz)   10/09/19 75.5 kg (166 lb 6.4 oz)     BP Readings from Last 3 Encounters:   12/17/19 117/76   10/09/19 114/79   08/03/19 112/76         Current Outpatient Medications   Medication     FLUoxetine (PROZAC) 20 MG capsule     LORazepam (ATIVAN) 0.5 MG tablet     mesalamine (CANASA) 1000 MG Suppository     multivitamin, therapeutic with minerals (THERA-VIT-M) TABS tablet     traZODone (DESYREL) 50 MG tablet     magnesium 250 MG tablet     vitamin B6 (PYRIDOXINE) 250 MG tablet     No current facility-administered medications for this visit.        Social History     Tobacco Use     Smoking status: Never Smoker     Smokeless tobacco: Never Used   Substance Use Topics     Alcohol use: Yes     Alcohol/week: 2.0 standard drinks     Types: 1 Glasses of wine, 1 Shots of liquor per week     Comment: occasional wine or vodka and soda     Drug use: No       Health Maintenance Due   Topic Date Due     DEPRESSION ACTION PLAN  1970     HIV SCREENING  02/25/1984     MEDICARE ANNUAL WELLNESS VISIT  02/25/1987     ZOSTER IMMUNIZATION (1 of 2) 02/25/2019       No results found for: PAP      December 17, 2019 10:44 AM    "

## 2019-12-17 NOTE — PROGRESS NOTES
SUBJECTIVE:   Doris Fritz is a 49 year old female who presents to clinic today to discuss the following problem(s).    Cold symptoms  - nasal congestion for 2 weeks  - facial pressure  - eyes itching  - cough  - headache    ROS:   CONSTITUTIONAL: Fatigue NEGATIVE for chills, fatigue, sweats  EYES: Irritated eyes  ENT/MOUTH: Nasal congestion, postnasal drainage and rhinorrhea  RESP: Cough. NEGATIVE for hemoptysis, SOB/dyspnea and wheezing  CV: NEGATIVE for chest pain/chest pressure, dyspnea on exertion  GI: NEGATIVE for abdominal pain, diarrhea, dysphagia and nausea  MUSCULOSKELETAL: NEGATIVE for body aches    Past Medical History:   Diagnosis Date     Depressive disorder      Fibromyalgia      Past Surgical History:   Procedure Laterality Date     ABDOMEN SURGERY      laparoscopy check ovaries     AS REDUCTION OF LARGE BREAST       DENTAL SURGERY       DILATION AND CURETTAGE      miscarriage     Family History   Problem Relation Age of Onset     Pulmonary fibrosis Mother 65     Chronic Obstructive Pulmonary Disease Father         Tobacco     Anxiety Disorder Sister      Psoriasis Sister      Hypothyroidism Sister      Heart Disease Paternal Grandfather      Social History     Tobacco Use     Smoking status: Never Smoker     Smokeless tobacco: Never Used   Substance Use Topics     Alcohol use: Yes     Alcohol/week: 2.0 standard drinks     Types: 1 Glasses of wine, 1 Shots of liquor per week     Comment: occasional wine or vodka and soda     Drug use: No     Social History     Social History Narrative     Not on file       Current Outpatient Medications   Medication     FLUoxetine (PROZAC) 20 MG capsule     LORazepam (ATIVAN) 0.5 MG tablet     mesalamine (CANASA) 1000 MG Suppository     multivitamin, therapeutic with minerals (THERA-VIT-M) TABS tablet     traZODone (DESYREL) 50 MG tablet     magnesium 250 MG tablet     vitamin B6 (PYRIDOXINE) 250 MG tablet     No current facility-administered medications for this  "visit.      I have reviewed the patient's past medical, surgical, family, and social history.     OBJECTIVE:   /76 (BP Location: Left arm, Patient Position: Sitting, Cuff Size: Adult Large)   Pulse 69   Temp 97.9  F (36.6  C) (Oral)   Resp 15   Ht 1.778 m (5' 10\")   Wt 73.7 kg (162 lb 8 oz)   LMP  (LMP Unknown)   SpO2 100%   Breastfeeding No   BMI 23.32 kg/m      Constitutional: well-appearing, appears stated age  Eyes: conjunctivae with moderate erythema bilaterally, sclera anicteric.   ENT: audible sinus congestion, tonsillar erythema and posterior drainage  Cardiac: regular rate and rhythm, normal S1/S2, no murmur/rubs/gallops  Respiratory: lungs clear to auscultation bilaterally, normal work of breathing, no wheezes/crackles  Skin: no rashes, lesions, or wounds  Psych: affect is full and appropriate, speech is fluent and non-pressured    ASSESSMENT AND PLAN:     Doris was seen today for nasal congestion, facial pressure, eye itching both eyes, cough and headache.    Diagnoses and all orders for this visit:    Cough  -     guaiFENesin-codeine (ROBITUSSIN AC) 100-10 MG/5ML solution; Take 5-10 mLs by mouth every 4 hours as needed for cough    Suspect viral URI. Rx as noted above. Discussed non-pharmacologic symptom management and advised patient of concerning symptoms indicating a need for further medical advice as noted in patient instructions.         Donn Ramirez MD  [facility]  12/17/2019, 10:48 AM    "

## 2019-12-19 ENCOUNTER — OFFICE VISIT (OUTPATIENT)
Dept: BEHAVIORAL HEALTH | Facility: CLINIC | Age: 49
End: 2019-12-19

## 2019-12-19 ENCOUNTER — OFFICE VISIT (OUTPATIENT)
Dept: FAMILY MEDICINE | Facility: CLINIC | Age: 49
End: 2019-12-19
Payer: COMMERCIAL

## 2019-12-19 VITALS
DIASTOLIC BLOOD PRESSURE: 82 MMHG | TEMPERATURE: 97.9 F | SYSTOLIC BLOOD PRESSURE: 115 MMHG | HEART RATE: 81 BPM | RESPIRATION RATE: 15 BRPM | WEIGHT: 161.5 LBS | HEIGHT: 70 IN | OXYGEN SATURATION: 99 % | BODY MASS INDEX: 23.12 KG/M2

## 2019-12-19 DIAGNOSIS — Z78.9 ALCOHOL USE: Primary | ICD-10-CM

## 2019-12-19 DIAGNOSIS — F10.10 ALCOHOL ABUSE: Primary | ICD-10-CM

## 2019-12-19 LAB
ALBUMIN SERPL-MCNC: 3.9 G/DL (ref 3.4–5)
ALP SERPL-CCNC: 74 U/L (ref 40–150)
ALT SERPL W P-5'-P-CCNC: 19 U/L (ref 0–50)
AST SERPL W P-5'-P-CCNC: 20 U/L (ref 0–45)
BILIRUB DIRECT SERPL-MCNC: 0.2 MG/DL (ref 0–0.2)
BILIRUB SERPL-MCNC: 0.5 MG/DL (ref 0.2–1.3)
PROT SERPL-MCNC: 6.7 G/DL (ref 6.8–8.8)

## 2019-12-19 ASSESSMENT — MIFFLIN-ST. JEOR: SCORE: 1432.81

## 2019-12-19 NOTE — PROGRESS NOTES
"Patient had appointment with his/her primary care physician. [facility] services were offered. No immediate safety/risk issues were reported or identified.  Explained the role of the [facility], answered all questions, and provided contact information for the [facility].       Doris reported \"alcohol abuse\" of late and is considering options to address current alcohol use.  [facility] highlighted change talk, and inquired if she was aware of tx options. Doris expressed vague understanding, so [facility] educated her about tx options and different levels of care.  Doris was uncertain about formal tx options, so [facility] inquired about knowledge/experience with AA.  Doris reported past consistent attendance (approixmately 4-5 years ago) and stated it was effective.  Doris expressed ambivalence about finding a meeting and motivation to re-engage and be held accountable by group members.  Doris stated she is, and has been, seeing a therapist long-term for depression, and has not told therapist about alcohol use and the inquired about possibility of seeing both therapist and [facility] at the same time.  [facility] directed her contact health insurance in regard to covering both services simultaneously, but stated therapeutically it would not be advised.  [facility] stated if she wanted to meet with [facility] for more information about tx options and/or to create a plan to address alcohol use and how to incorporate current therapy that would be fine, but any services provided by [facility] would have the end goal of incorporating current therapy so she could receive the most effective tx possible.  Doris expressed understanding and affirmed wanting to live a singular and honest life, rather than sharing some information with some people and other information with other people.     [facility] noted her desire to address alcohol use now and inquired why she would do it before the holidays rather than after holidays.  Doris identified potential benefits including holidays may go " better, she may feel better, and she tired and wants to change.  [facility] inquired about how she could get immediate support if she didn't enter tx program, Doris stated she may return to  meeting over the holidays.   [facility] supported her ability to take action and encouraged her to contact [facility] or scheduled appt with [facility] if she needed any further support. Doris stated she would schedule if needed.     Doris did not express, nor display, any signs/symptoms of alcohol/substance withdrawal.      Shawn G. Ullrich, [facility], Behavioral Health Clinician

## 2019-12-19 NOTE — NURSING NOTE
"49 year old  Chief Complaint   Patient presents with     Fatigue     and weakness. especially when she tries to work out. Tiredness has been ongoing for about 3 years but is now getting worse.       Blood pressure 115/82, pulse 81, temperature 97.9  F (36.6  C), temperature source Oral, resp. rate 15, height 1.778 m (5' 10\"), weight 73.3 kg (161 lb 8 oz), SpO2 99 %, not currently breastfeeding. Body mass index is 23.17 kg/m .  Patient Active Problem List   Diagnosis     Urethral diverticulum     Chronic constipation     Anxiety and depression     Chronic fatigue     Gastrointestinal food sensitivity     Fibromyalgia     Weight gain       Wt Readings from Last 2 Encounters:   12/19/19 73.3 kg (161 lb 8 oz)   12/17/19 73.7 kg (162 lb 8 oz)     BP Readings from Last 3 Encounters:   12/19/19 115/82   12/17/19 117/76   10/09/19 114/79         Current Outpatient Medications   Medication     FLUoxetine (PROZAC) 20 MG capsule     guaiFENesin-codeine (ROBITUSSIN AC) 100-10 MG/5ML solution     LORazepam (ATIVAN) 0.5 MG tablet     magnesium 250 MG tablet     mesalamine (CANASA) 1000 MG Suppository     multivitamin, therapeutic with minerals (THERA-VIT-M) TABS tablet     traZODone (DESYREL) 50 MG tablet     vitamin B6 (PYRIDOXINE) 250 MG tablet     No current facility-administered medications for this visit.        Social History     Tobacco Use     Smoking status: Never Smoker     Smokeless tobacco: Never Used   Substance Use Topics     Alcohol use: Yes     Alcohol/week: 2.0 standard drinks     Types: 1 Glasses of wine, 1 Shots of liquor per week     Comment: occasional wine or vodka and soda     Drug use: No       Health Maintenance Due   Topic Date Due     DEPRESSION ACTION PLAN  1970     HIV SCREENING  02/25/1984     MEDICARE ANNUAL WELLNESS VISIT  02/25/1987     ZOSTER IMMUNIZATION (1 of 2) 02/25/2019       No results found for: PAP      December 19, 2019 9:51 AM  "

## 2019-12-19 NOTE — PROGRESS NOTES
"SUBJECTIVE:   Doris Fritz is a 49 year old female who presents to clinic today to discuss the following problem(s).    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 IOP? AA?   - today is mostly about stating the issue for the first time. She is interested in talking to Julien  - also, has been looking online and is concerned about the possible damage she has done to her liver. She would like to have her liver checked (she has had significant testing recently for concerns related to her low energy) today, but in not really expecting much beyond this today.     Alcohol History  - didn't really start drinking until she was in her 30s and went to culinary school. Then, when her mother passed away about 6 years ago, she noticed she was drinking a lot with her father with whom she has a dysfunctional relationship and note to herself \"this could become a problem\"  - now report she will have maybe 2-3 drinks a night and will sometimes drink much more after her  goes to bed      ROS:   CONSTITUTIONAL: Fatigue. Frustrated with persistent weight gain.   RESP: NEGATIVE for cough, SOB/dyspnea and wheezing  CV: NEGATIVE for chest pain/chest pressure, dyspnea on exertion  GI: NEGATIVE for abdominal pain, diarrhea, dysphagia and nausea  MUSCULOSKELETAL: NEGATIVE for arthralgias, cyanosis, or edema  NEURO: History of fibromyalgia. NEGATIVE for dizziness/lightheadedness, memory problems and paresthesias   PSYCHIATRIC: History of depression she " "reports currently well controlled. Concern for \"self medicating\" with alcohol as noted above.      Past Medical History:   Diagnosis Date     Depressive disorder      Fibromyalgia      Past Surgical History:   Procedure Laterality Date     ABDOMEN SURGERY      laparoscopy check ovaries     AS REDUCTION OF LARGE BREAST       DENTAL SURGERY       DILATION AND CURETTAGE      miscarriage     Family History   Problem Relation Age of Onset     Pulmonary fibrosis Mother 65     Chronic Obstructive Pulmonary Disease Father         Tobacco     Anxiety Disorder Sister      Psoriasis Sister      Hypothyroidism Sister      Heart Disease Paternal Grandfather      Social History     Tobacco Use     Smoking status: Never Smoker     Smokeless tobacco: Never Used   Substance Use Topics     Alcohol use: Yes     Alcohol/week: 2.0 standard drinks     Types: 1 Glasses of wine, 1 Shots of liquor per week     Comment: occasional wine or vodka and soda     Drug use: No     Social History     Social History Narrative     Not on file       Current Outpatient Medications   Medication     FLUoxetine (PROZAC) 20 MG capsule     guaiFENesin-codeine (ROBITUSSIN AC) 100-10 MG/5ML solution     LORazepam (ATIVAN) 0.5 MG tablet     magnesium 250 MG tablet     mesalamine (CANASA) 1000 MG Suppository     multivitamin, therapeutic with minerals (THERA-VIT-M) TABS tablet     traZODone (DESYREL) 50 MG tablet     vitamin B6 (PYRIDOXINE) 250 MG tablet     No current facility-administered medications for this visit.      I have reviewed the patient's past medical, surgical, family, and social history.     OBJECTIVE:   /82   Pulse 81   Temp 97.9  F (36.6  C) (Oral)   Resp 15   Ht 1.778 m (5' 10\")   Wt 73.3 kg (161 lb 8 oz)   LMP  (LMP Unknown)   SpO2 99%   BMI 23.17 kg/m      Constitutional: well-appearing, appears stated age  Eyes: conjunctivae without erythema, sclera anicteric.   Cardiac: regular rate and rhythm, normal S1/S2, no " murmur/rubs/gallops  Respiratory: lungs clear to auscultation bilaterally, normal work of breathing, no wheezes/crackles  Skin: no rashes, lesions, or wounds  Psych: affect is full and appropriate, speech is fluent and non-pressured    ASSESSMENT AND PLAN:     Doris was seen today for fatigue.    Diagnoses and all orders for this visit:    Alcohol use  -     Cancel: Liver Panel Plus ([place])  -     Hepatic panel    Will get labs for liver assessment as noted above. Extensive conversation encouraging patient with steps toward addressing concerning alcohol use. Warm hand-off with Julien to discuss treatment options for alcohol use.    Did discuss a possible trial of naltrexone/vivtrol with patient as adjunct therapy in addition to counseling.     Will contact patient with results of labs as available.     The total time for the visit was 25 minutes. More than 50% of this time was spent counseling the patient      Donn Ramirez MD  [facility]  12/19/2019, 10:24 AM

## 2019-12-27 ENCOUNTER — MYC MEDICAL ADVICE (OUTPATIENT)
Dept: FAMILY MEDICINE | Facility: CLINIC | Age: 49
End: 2019-12-27

## 2019-12-27 DIAGNOSIS — Z78.9 ALCOHOL USE: Primary | ICD-10-CM

## 2019-12-27 RX ORDER — NALTREXONE HYDROCHLORIDE 50 MG/1
50 TABLET, FILM COATED ORAL DAILY
Qty: 30 TABLET | Refills: 1 | Status: SHIPPED | OUTPATIENT
Start: 2019-12-27 | End: 2020-05-27

## 2019-12-27 NOTE — TELEPHONE ENCOUNTER
Discussed starting naltrexone at last visit. My chart message from patient wanting to start this med. I have agreed to write Rx.    Donn Ramirez MD  12:42 PM, December 27, 2019

## 2019-12-31 ENCOUNTER — OFFICE VISIT (OUTPATIENT)
Dept: BEHAVIORAL HEALTH | Facility: CLINIC | Age: 49
End: 2019-12-31
Payer: COMMERCIAL

## 2019-12-31 DIAGNOSIS — F33.9 RECURRENT MAJOR DEPRESSIVE DISORDER (H): ICD-10-CM

## 2019-12-31 ASSESSMENT — COLUMBIA-SUICIDE SEVERITY RATING SCALE - C-SSRS
1. IN THE PAST MONTH, HAVE YOU WISHED YOU WERE DEAD OR WISHED YOU COULD GO TO SLEEP AND NOT WAKE UP?: YES
6. HAVE YOU EVER DONE ANYTHING, STARTED TO DO ANYTHING, OR PREPARED TO DO ANYTHING TO END YOUR LIFE?: NO
5. HAVE YOU STARTED TO WORK OUT OR WORKED OUT THE DETAILS OF HOW TO KILL YOURSELF? DO YOU INTEND TO CARRY OUT THIS PLAN?: NO
1. IN THE PAST MONTH, HAVE YOU WISHED YOU WERE DEAD OR WISHED YOU COULD GO TO SLEEP AND NOT WAKE UP?: YES
TOTAL  NUMBER OF INTERRUPTED ATTEMPTS PAST 3 MONTHS: NO
ATTEMPT PAST THREE MONTHS: NO
TOTAL  NUMBER OF ABORTED OR SELF INTERRUPTED ATTEMPTS PAST LIFETIME: NO
TOTAL  NUMBER OF INTERRUPTED ATTEMPTS LIFETIME: NO
2. HAVE YOU ACTUALLY HAD ANY THOUGHTS OF KILLING YOURSELF?: NO
REASONS FOR IDEATION LIFETIME: COMPLETELY TO END OR STOP THE PAIN (YOU COULDN'T GO ON LIVING WITH THE PAIN OR HOW YOU WERE FEELING)
ATTEMPT LIFETIME: NO
4. HAVE YOU HAD THESE THOUGHTS AND HAD SOME INTENTION OF ACTING ON THEM?: NO
REASONS FOR IDEATION PAST MONTH: COMPLETELY TO END OR STOP THE PAIN (YOU COULDN'T GO ON LIVING WITH THE PAIN OR HOW YOU WERE FEELING)
3. HAVE YOU BEEN THINKING ABOUT HOW YOU MIGHT KILL YOURSELF?: NO
6. HAVE YOU EVER DONE ANYTHING, STARTED TO DO ANYTHING, OR PREPARED TO DO ANYTHING TO END YOUR LIFE?: NO
5. HAVE YOU STARTED TO WORK OUT OR WORKED OUT THE DETAILS OF HOW TO KILL YOURSELF? DO YOU INTEND TO CARRY OUT THIS PLAN?: NO
4. HAVE YOU HAD THESE THOUGHTS AND HAD SOME INTENTION OF ACTING ON THEM?: NO
2. HAVE YOU ACTUALLY HAD ANY THOUGHTS OF KILLING YOURSELF LIFETIME?: NO
TOTAL  NUMBER OF ABORTED OR SELF INTERRUPTED ATTEMPTS PAST 3 MONTHS: NO

## 2019-12-31 ASSESSMENT — ANXIETY QUESTIONNAIRES
IF YOU CHECKED OFF ANY PROBLEMS ON THIS QUESTIONNAIRE, HOW DIFFICULT HAVE THESE PROBLEMS MADE IT FOR YOU TO DO YOUR WORK, TAKE CARE OF THINGS AT HOME, OR GET ALONG WITH OTHER PEOPLE: VERY DIFFICULT
3. WORRYING TOO MUCH ABOUT DIFFERENT THINGS: MORE THAN HALF THE DAYS
7. FEELING AFRAID AS IF SOMETHING AWFUL MIGHT HAPPEN: MORE THAN HALF THE DAYS
1. FEELING NERVOUS, ANXIOUS, OR ON EDGE: MORE THAN HALF THE DAYS
GAD7 TOTAL SCORE: 14
6. BECOMING EASILY ANNOYED OR IRRITABLE: NEARLY EVERY DAY
2. NOT BEING ABLE TO STOP OR CONTROL WORRYING: NEARLY EVERY DAY
5. BEING SO RESTLESS THAT IT IS HARD TO SIT STILL: SEVERAL DAYS

## 2019-12-31 ASSESSMENT — PATIENT HEALTH QUESTIONNAIRE - PHQ9
5. POOR APPETITE OR OVEREATING: SEVERAL DAYS
SUM OF ALL RESPONSES TO PHQ QUESTIONS 1-9: 17

## 2019-12-31 NOTE — PROGRESS NOTES
[facility]  December 31, 2019      Behavioral Health Clinician Progress Note    Patient Name: Doris Fritz           Service Type:  Individual      Service Location:   Face to Face in Clinic     Session Start Time: 10:17am  Session End Time: 11:00am      Session Length: 38 - 52      Attendees: Client    Visit Activities (Refresh list every visit): NEW and [facility] Only    Diagnostic Assessment Date: NA  Treatment Plan Review Date: NA  CGI Review Date: 3/31/20    See Flowsheets for today's PHQ-9 and TJ-7 results  Previous PHQ-9:   PHQ-9 SCORE 5/3/2019 10/9/2019 12/31/2019   PHQ-9 Total Score 16 12 17     Previous TJ-7:   TJ-7 SCORE 5/3/2019 8/15/2019 12/31/2019   Total Score 15 12 14       BETTY LEVEL:  No flowsheet data found.    DATA  Extended Session (60+ minutes): No  Interactive Complexity: No  Crisis: No  [facility] Patient: No    Treatment Objective(s) Addressed in This Session:  Target Behavior(s): alcohol use    Depressed Mood: Increase interest, engagement, and pleasure in doing things  Decrease frequency and intensity of feeling down, depressed, hopeless  Improve quantity and quality of night time sleep / decrease daytime naps  Feel less tired and more energy during the day   Identify negative self-talk and behaviors: challenge core beliefs, myths, and actions  Improve concentration, focus, and mindfulness in daily activities   Feel less fidgety, restless or slow in daily activities / interpersonal interactions  Decrease thoughts that you'd be better off dead or of suicide / self-harm  Alcohol / Substance Use: will increase understanding of the effects of substance use  will make healthier choices in regard to substance use  will discuss/consider potential need for formal substance use evaluation, treatment and/or support  continue to make healthy choices regarding substance use and engage in activities / supportive services that promote sobriety  Eating Disorder/Body Image: manage with healthy  strategies    Current Stressors / Issues:  -On disability due to MDD and Fibromyalgia  -Current/Past eating disorder/body image image issues  -increased alcohol use to manage symptoms  - concerned about alcohol use  - has started a new business        Progress on Treatment Objective(s) / Homework:  No improvement - CONTEMPLATION (Considering change and yet undecided); Intervened by assessing the negative and positive thinking (ambivalence) about behavior change    Motivational Interviewing    MI Intervention: Co-Developed Goal: increase knowledge of tx options and services; reduce alcohol use; effective management of body image/eating disorder issues, Expressed Empathy/Understanding, Supported Autonomy, Collaboration, Evocation, Open-ended questions, Reflections: simple and complex and Change talk (evoked)     Change Talk Expressed by the Patient: Desire to change Reasons to change Need to change    Provider Response to Change Talk: E - Evoked more info from patient about behavior change, A - Affirmed patient's thoughts, decisions, or attempts at behavior change, R - Reflected patient's change talk and S - Summarized patient's change talk statements      Due to late start time of appt, [facility] was not able to complete full diagnostic assessment within the allotted time of the session.  [facility] was able to identify current symptoms, collect some social history, provide safety planning, and identify general goals for [facility] services.    [facility] will collect remaining information and complete diagnostic assessment at next appt.     Doris reported history of depression, eating disorder/body image issues, unhealthy alcohol use and fibromyalgia.  Doris reported history of engaging in mental health services, and has current long-term therapist, although Doris stated she will pause current therapy services in order to focus on current goals with [facility].      Endorsed/reported the following  -low mood, anhedonia, guilt,  "fatigue, poor concentration, hopeless, poor sleep, and passive SI  -No history of SA  -past outpatient/therapy tx at [place]  -Binge and Purge approximately 10 days ago  -Intermittent Calorie Restriction  -Reports primarily drinking to manage emotions resulting from body image issues, although notes alcohol use has \"never been healthy\"  -Alcohol use is \"problematic\", impacting \"relationships\" and ability to engage in and/or complete tasks  -Body image challenges (showers in the dark)  -\"secret drinking\"  -does not drink daily, but drinks when having bad days; \"When i'm feeling gross about my body\", can drink up to 2/3 liter of Vodka throughout the day.      Doris denied current SI, intent or plan, noting most recent SI was one week ago.  Doris reported sometimes feeling hopeless, and questioning if the struggle is worth it (history of MDD, Fibromyalgia, and Eating disorder).  Doris denied history of SA.  Doris was future oriented and engaged in planning with [facility]. Doris is going to sober event for New Year's Birgit with .  Doris scheduled appt with [facility].   Doris identified protective factors.  [facility] provided crisis information and services to Doris, including how to increase safety through use of [facility].  Doris confirmed and expressed confidence in ability to remain safe.    Doris noted her current goals are to address her alcohol use and body image issues.  Doris reported she needs more information regarding BARBARA and BARBARA treatment in order to make an informed decision about best tx options/plan for her.  Doris reported her  is supportive of her stopping use.  Doris reported she filled prescription for naltrexone, prescribed by PCP, and will start it today.  Doris stated she knows she needs to first address the alcohol use, but also needs to address body image/eating disorder issue as well.  Doris expressed wanting to create a plan to best " "address both issues.     Doris and [facility] agreed to meet for further services; [facility] will provide education regarding services and tx options.  [facility] and Doris will continue to assess for safety, and engage in safety planning if necessary.  [facility] and Doris will identify immediate strategies to reduce/stop alcohol use and to effectively address body image/eating disorder issues.       Care Plan review completed: Yes    Medication Review:  No changes to current psychiatric medication(s)    Medication Compliance:  Yes    Changes in Health Issues:   None reported    Chemical Use Review:   Substance Use: Problem use continues with no change since last session, Reviewed information and resources for treatment and ongoing sobriety  Patient assessed present costs and future losses as a result of substance use  discussed tx options        Tobacco Use: No current tobacco use.      Assessment: Current Emotional / Mental Status (status of significant symptoms):  Risk status (Self / Other harm or suicidal ideation)  Patient has had a history of suicidal ideation: intermittent, passive, no intent or plan  Patient denies current fears or concerns for personal safety.  Patient reports the following current or recent suicidal ideation or behaviors: \"very passive\", intermittent, no plan, no intent, no history of SA.  Patient denies current or recent homicidal ideation or behaviors.  Patient denies current or recent self injurious behavior or ideation.  Patient denies other safety concerns.  A safety and risk management plan has not been developed at this time, however patient was encouraged to call [facility] / [facility] should there be a change in any of these risk factors.; discussed and educated Doris about multiple options/services to increase safety, including Crisis Residence.    Appearance:   Appropriate   Eye Contact:   Fair   Psychomotor Behavior: Normal   Attitude:   Cooperative   Orientation:   All  Speech   Rate / " Production: Normal    Volume:  Normal   Mood:    Anxious  Depressed   Affect:    Appropriate  Worrisome   Thought Content:  Clear   Thought Form:  Coherent  Logical   Insight:    Fair     Diagnoses:  1. Recurrent major depressive disorder (H)        Collateral Reports Completed:  Routed note to PCP; Doris stated she would take a break from therapy with long-term therapist at this time to work with [facility] and focus on alcohol use and eating disorder/body image issues; did not sign [facility] for long-term therapis      Plan: (Homework, other):  Patient was given information about behavioral services and encouraged to schedule a follow up appointment with the clinic [facility] in 1 week.  She was also given information about mental health symptoms and treatment options  and BARBARA tx options.  CD Recommendations: Practice Harm Reduction: continue to work [facility] to identify options and create plan; start Naltrexone, ..  Shawn Ullrich [facility]      ______________________________________________________________________    Integrated Primary Care Behavioral Health Treatment Plan    Patient's Name: Doris Fritz  YOB: 1970    Date: 12/31/19    DSM-V Diagnoses: 296.30 (F33.9) Major Depressive Disorder, Recurrent Episode, Unspecified With anxious distress; Alcohol use Disorder, unspecified eating disorder    Psychosocial / Contextual Factors:   -On disability due to MDD and Fibromyalgia  -Current/Past eating disorder/body image image issues  -increased alcohol use to manage other symptoms  - concerned about alcohol use  - has started a new business      WHODAS: See screening from 12/31/19    Referral / Collaboration:  will continue to assess for referral to other providers/tx options; declined any referrals at this time; did not sign [facility].    Anticipated number of session or this episode of care: 6      MeasurableTreatment Goal(s) related to diagnosis / functional impairment(s)  Goal 1:     Doris noted  her current goals are to address her alcohol use and body image issues.  Doris reported she needs more information regarding BARBARA and BARBARA treatment in order to make an informed decision about best tx options/plan for her.  Doris reported her  is supportive of her stopping use.  Doris reported she filled prescription for naltrexone, prescribed by PCP, and will start it today.  Doris stated she knows she needs to first address the alcohol use, but also needs to address body image/eating disorder issue as well.  Doris expressed wanting to create a plan to best address both issues.       Will complete tx plan during next appts.     Patient has reviewed and agreed to the above plan.      Shawn G. Ullrich, ROSEMARY  December 31, 2019

## 2020-01-01 ASSESSMENT — ANXIETY QUESTIONNAIRES: GAD7 TOTAL SCORE: 14

## 2020-01-07 ENCOUNTER — OFFICE VISIT (OUTPATIENT)
Dept: BEHAVIORAL HEALTH | Facility: CLINIC | Age: 50
End: 2020-01-07
Payer: COMMERCIAL

## 2020-01-07 DIAGNOSIS — F50.20 BULIMIA NERVOSA: ICD-10-CM

## 2020-01-07 DIAGNOSIS — F33.1 MAJOR DEPRESSIVE DISORDER, RECURRENT EPISODE, MODERATE (H): ICD-10-CM

## 2020-01-07 DIAGNOSIS — F10.20 UNCOMPLICATED ALCOHOL DEPENDENCE (H): ICD-10-CM
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
## 2020-01-09 PROBLEM — F10.20 UNCOMPLICATED ALCOHOL DEPENDENCE (H): Status: ACTIVE | Noted: 2020-01-09

## 2020-01-09 PROBLEM — F33.1 MAJOR DEPRESSIVE DISORDER, RECURRENT EPISODE, MODERATE (H): Status: ACTIVE | Noted: 2020-01-09

## 2020-01-09 PROBLEM — F50.20 BULIMIA NERVOSA: Status: ACTIVE | Noted: 2020-01-09

## 2020-01-14 ENCOUNTER — OFFICE VISIT (OUTPATIENT)
Dept: BEHAVIORAL HEALTH | Facility: CLINIC | Age: 50
End: 2020-01-14
Payer: COMMERCIAL

## 2020-01-14 DIAGNOSIS — F33.1 MAJOR DEPRESSIVE DISORDER, RECURRENT EPISODE, MODERATE (H): Primary | ICD-10-CM

## 2020-01-14 DIAGNOSIS — F50.20 BULIMIA NERVOSA: ICD-10-CM

## 2020-01-14 ASSESSMENT — ANXIETY QUESTIONNAIRES
IF YOU CHECKED OFF ANY PROBLEMS ON THIS QUESTIONNAIRE, HOW DIFFICULT HAVE THESE PROBLEMS MADE IT FOR YOU TO DO YOUR WORK, TAKE CARE OF THINGS AT HOME, OR GET ALONG WITH OTHER PEOPLE: SOMEWHAT DIFFICULT
7. FEELING AFRAID AS IF SOMETHING AWFUL MIGHT HAPPEN: SEVERAL DAYS
3. WORRYING TOO MUCH ABOUT DIFFERENT THINGS: MORE THAN HALF THE DAYS
2. NOT BEING ABLE TO STOP OR CONTROL WORRYING: MORE THAN HALF THE DAYS
6. BECOMING EASILY ANNOYED OR IRRITABLE: MORE THAN HALF THE DAYS
GAD7 TOTAL SCORE: 11
5. BEING SO RESTLESS THAT IT IS HARD TO SIT STILL: SEVERAL DAYS
1. FEELING NERVOUS, ANXIOUS, OR ON EDGE: MORE THAN HALF THE DAYS

## 2020-01-14 ASSESSMENT — PATIENT HEALTH QUESTIONNAIRE - PHQ9
SUM OF ALL RESPONSES TO PHQ QUESTIONS 1-9: 10
5. POOR APPETITE OR OVEREATING: SEVERAL DAYS

## 2020-01-14 NOTE — PROGRESS NOTES
[facility]  January 14, 2020      Behavioral Health Clinician Progress Note    Patient Name: Doris Fritz           Service Type:  Individual      Service Location:   Face to Face in Clinic     Session Start Time: 3:01 pm  Session End Time: 3:54pm      Session Length: 53 - 60      Attendees: Client    Visit Activities (Refresh list every visit): [facility] Only    Diagnostic Assessment Date: 1/7/2020  Treatment Plan Review Date: 4/14/20  CGI Review Date 4/7/20    See Flowsheets for today's PHQ-9 and TJ-7 results  Previous PHQ-9:   PHQ-9 SCORE 10/9/2019 12/31/2019 1/14/2020   PHQ-9 Total Score 12 17 10     Previous TJ-7:   TJ-7 SCORE 8/15/2019 12/31/2019 1/14/2020   Total Score 12 14 11       BETTY LEVEL:  No flowsheet data found.    DATA  Extended Session (60+ minutes): No  Interactive Complexity: No  Crisis: No  [facility] Patient: No    Treatment Objective(s) Addressed in This Session:  Target Behavior(s): diet/weight loss and alcohol use    Depressed Mood: Increase interest, engagement, and pleasure in doing things  Decrease frequency and intensity of feeling down, depressed, hopeless  Feel less tired and more energy during the day   Improve diet, appetite, mindful eating, and / or meal planning  Identify negative self-talk and behaviors: challenge core beliefs, myths, and actions  Anxiety: will experience a reduction in anxiety, will develop more effective coping skills to manage anxiety symptoms, will develop healthy cognitive patterns and beliefs and will increase ability to function adaptively  Alcohol / Substance Use: will make healthier choices in regard to substance use  will discuss/consider potential need for formal substance use evaluation, treatment and/or support  continue to make healthy choices regarding substance use and engage in activities / supportive services that promote sobriety    Current Stressors / Issues:  History of Bulimia, Depression, and Alcohol Use  Fibromyalgia  Currently  "prescribed, taking and benefiting from Naltrexone  No Alcohol use in several weeks; no cravings to use alcohol  Decreased cravings for sugar  Wants to work on recovery      Progress on Treatment Objective(s) / Homework:  Satisfactory progress - ACTION (Actively working towards change); Intervened by reinforcing change plan / affirming steps taken  -\"I haven't had a drink, not even thinking about it\"  -Continued decreased cravings for sugar  -some \"mindless eating\", when thinking/worrying  -continued anxiety and depressive symptoms, although they have mildly decreased since starting Naltrexone and stopped alcohol use  -No SI  -No recent (since last appt) eating disorder behaviors  -My childhood felt very insecure and apprehensive    Motivational Interviewing    MI Intervention: Co-Developed Goal: maintain sobriety; decrease depression and anxiety, Expressed Empathy/Understanding, Supported Autonomy, Collaboration, Evocation, Open-ended questions, Reflections: simple and complex and Change talk (evoked)     Change Talk Expressed by the Patient: Desire to change Reasons to change    Provider Response to Change Talk: E - Evoked more info from patient about behavior change, A - Affirmed patient's thoughts, decisions, or attempts at behavior change, R - Reflected patient's change talk and S - Summarized patient's change talk statements    Tx Planning; engaged patient in identification of goals and creation of tx plan    Since Doris is not drinking and not engaging in eating disorder behaviors, Doris feels a little lost in her recovery and expresses confusion about how to move forward.    Doris and [facility] will work to develop appropriate daily schedule/routine to further recovery.      Priorities:  -Psychedelic Therapy?   I'd like to get some new perspective; ingrained patterns that I know are not true    Create schedule  -daily house work  -Work couple days per week (will know more after team meeting  -dinner " prep  -stationary bike 4 days per week  -take dog out  -shower?  -daily devotional?  -daily gratefuls?          Care Plan review completed: Yes    Medication Review:  No changes to current psychiatric medication(s)    Medication Compliance:  Yes    Changes in Health Issues:   None reported    Chemical Use Review:   Substance Use: decrease in alcohol .  Patient reports frequency of use --none.  Stage of Change: Action        Tobacco Use: No current tobacco use.      Assessment: Current Emotional / Mental Status (status of significant symptoms):  Risk status (Self / Other harm or suicidal ideation)  Patient has had a history of suicidal ideation: none recent  Patient denies current fears or concerns for personal safety.  Patient denies current or recent suicidal ideation or behaviors.  Patient denies current or recent homicidal ideation or behaviors.  Patient denies current or recent self injurious behavior or ideation.  Patient denies other safety concerns.  A safety and risk management plan has not been developed at this time, however patient was encouraged to call [facility] / [facility] should there be a change in any of these risk factors.    Appearance:   Appropriate   Eye Contact:   Good   Psychomotor Behavior: Normal   Attitude:   Cooperative   Orientation:   All   Speech   Rate / Production: Normal    Volume:  Normal   Mood:    Normal  Affect:    primarily flat, some brief period of smiling   Thought Content:  Clear   Thought Form:  Coherent  Logical   Insight:    Fair     Diagnoses:  1. Major depressive disorder, recurrent episode, moderate (H)    2. Bulimia nervosa        Collateral Reports Completed:  Routed note to PCP    Plan: (Homework, other):  Patient was given information about behavioral services and encouraged to schedule a follow up appointment with the clinic [facility] in 1 week.  She was also given information about mental health symptoms and treatment options  and strategies to maintain sobriety.  [facility]  "Recommendations: Maintain Sobriety.  Julien Otoolelrich [facility], [facility]      ______________________________________________________________________    Integrated Primary Care Behavioral Health Treatment Plan    Patient's Name: Doris Fritz  YOB: 1970    Date: 1/14/2020    DSM-V Diagnoses: 296.32 (F33.1) Major Depressive Disorder, Recurrent Episode, Moderate With anxious distress, 307.5 (F50.2) Bulimia Nervosa   In partial remission  Severity: Moderate or Substance-Related & Addictive Disorders Alcohol Use Disorder   303.90 (F10.20) Moderate In early remission,   Psychosocial / Contextual Factors:   History of Bulimia, Depression, and Alcohol Use  Fibromyalgia  Currently prescribed, taking and benefiting from Naltrexone  No Alcohol use in several weeks; no cravings to use alcohol  Decreased cravings for sugar  Wants to work on recovery    WHODAS: See screenings     Referral / Collaboration:  Referral to another professional/service is not indicated at this time..    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     I will know I've met my goal when\": Doris feels that she contributing.         Objective #A (Patient Action)    Patient will Decrease frequency and intensity of feeling down, depressed, hopeless.  Status: New - Date: 1/14/20     Intervention(s)  [facility] will assign homework exercise 4 days per week.    Objective #B  Patient will Identify negative self-talk and behaviors: challenge core beliefs, myths, and actions.  Status: New - Date: 1/14/20     Intervention(s)  [facility] will teach the client how to perform a behavioral chain analysis. including Cognitive Distortions and Automatic Negative Thoughts.    Objective #C  Patient will maintain sobriety for 90 days  Increase interest, engagement, and pleasure in doing " things.  Status: New - Date: 1/14/20     Intervention(s)  Therapist will assign homework to create daily schedule .        Patient has reviewed and agreed to the above plan.      Shawn G. Ullrich, [facility]  January 14, 2020

## 2020-01-15 ASSESSMENT — ANXIETY QUESTIONNAIRES: GAD7 TOTAL SCORE: 11

## 2020-01-28 ENCOUNTER — OFFICE VISIT (OUTPATIENT)
Dept: BEHAVIORAL HEALTH | Facility: CLINIC | Age: 50
End: 2020-01-28
Payer: COMMERCIAL

## 2020-01-28 DIAGNOSIS — F33.1 MAJOR DEPRESSIVE DISORDER, RECURRENT EPISODE, MODERATE (H): Primary | ICD-10-CM

## 2020-01-28 ASSESSMENT — ANXIETY QUESTIONNAIRES
GAD7 TOTAL SCORE: 13
5. BEING SO RESTLESS THAT IT IS HARD TO SIT STILL: SEVERAL DAYS
1. FEELING NERVOUS, ANXIOUS, OR ON EDGE: MORE THAN HALF THE DAYS
2. NOT BEING ABLE TO STOP OR CONTROL WORRYING: MORE THAN HALF THE DAYS
7. FEELING AFRAID AS IF SOMETHING AWFUL MIGHT HAPPEN: SEVERAL DAYS
6. BECOMING EASILY ANNOYED OR IRRITABLE: NEARLY EVERY DAY
3. WORRYING TOO MUCH ABOUT DIFFERENT THINGS: MORE THAN HALF THE DAYS
IF YOU CHECKED OFF ANY PROBLEMS ON THIS QUESTIONNAIRE, HOW DIFFICULT HAVE THESE PROBLEMS MADE IT FOR YOU TO DO YOUR WORK, TAKE CARE OF THINGS AT HOME, OR GET ALONG WITH OTHER PEOPLE: SOMEWHAT DIFFICULT

## 2020-01-28 ASSESSMENT — PATIENT HEALTH QUESTIONNAIRE - PHQ9
5. POOR APPETITE OR OVEREATING: MORE THAN HALF THE DAYS
SUM OF ALL RESPONSES TO PHQ QUESTIONS 1-9: 15

## 2020-01-28 NOTE — PROGRESS NOTES
[facility]  January 28, 2020      Behavioral Health Clinician Progress Note    Patient Name: Doris Fritz           Service Type:  Individual      Service Location:   Face to Face in Clinic     Session Start Time: 3:00 pm  Session End Time: 3:44 pm      Session Length: 38 - 52      Attendees: Client    Visit Activities (Refresh list every visit): [facility] Only    Diagnostic Assessment Date: 1/7/2020  Treatment Plan Review Date: 4/14/20  CGI Review Date 4/7/20    See Flowsheets for today's PHQ-9 and TJ-7 results  Previous PHQ-9:   PHQ-9 SCORE 12/31/2019 1/14/2020 1/28/2020   PHQ-9 Total Score 17 10 15     Previous TJ-7:   TJ-7 SCORE 12/31/2019 1/14/2020 1/28/2020   Total Score 14 11 13       BETTY LEVEL:  No flowsheet data found.    DATA  Extended Session (60+ minutes): No  Interactive Complexity: No  Crisis: No  [facility] Patient: No    Treatment Objective(s) Addressed in This Session:  Target Behavior(s): diet/weight loss and alcohol use    Depressed Mood: Increase interest, engagement, and pleasure in doing things  Decrease frequency and intensity of feeling down, depressed, hopeless  Feel less tired and more energy during the day   Improve diet, appetite, mindful eating, and / or meal planning  Identify negative self-talk and behaviors: challenge core beliefs, myths, and actions  Anxiety: will experience a reduction in anxiety, will develop more effective coping skills to manage anxiety symptoms, will develop healthy cognitive patterns and beliefs and will increase ability to function adaptively  Alcohol / Substance Use: will make healthier choices in regard to substance use  will discuss/consider potential need for formal substance use evaluation, treatment and/or support  continue to make healthy choices regarding substance use and engage in activities / supportive services that promote sobriety    Current Stressors / Issues:  History of Bulimia, Depression, and Alcohol Use  Fibromyalgia  Currently  "prescribed, taking and benefiting from Naltrexone  No Alcohol use in several weeks; no cravings to use alcohol  Decreased cravings for sugar  Wants to work on recovery      Progress on Treatment Objective(s) / Homework:  Satisfactory progress - ACTION (Actively working towards change); Intervened by reinforcing change plan / affirming steps taken  -no alcohol use; brief thoughts of drinking when experiencing increased pain (could not use pain meds b/c is taking naltrexone), but was able to move on from thoughts  -some increased eating/\"grazing\"; no binging  -continued anxiety and depressive symptoms  -No SI   -reports progress on creating and implementing schedule, which is helpful--believes schedule/structure is helpful for her recovery      Motivational Interviewing    MI Intervention: Co-Developed Goal: maintain sobriety; decrease depression and anxiety, Expressed Empathy/Understanding, Supported Autonomy, Collaboration, Evocation, Open-ended questions, Reflections: simple and complex and Change talk (evoked)     Change Talk Expressed by the Patient: Desire to change Reasons to change    Provider Response to Change Talk: E - Evoked more info from patient about behavior change, A - Affirmed patient's thoughts, decisions, or attempts at behavior change, R - Reflected patient's change talk and S - Summarized patient's change talk statements    Doris responded well to MI, expressing change talk regarding creating schedule; even despite Doris experiencing increased pain yesterday, she was still able to complete some schedule items, which resulted in some positive emotions, even when having a bad day.     Working on schedule  -Wake between 7:30 and 8-15am  -3 gratefuls or serenity prayer  -take liver supplement  -brush teeth  -shower (if working out, then doesn't shower)  -get dressed  -check emails/social media  -either household chores or work tasks   -Some days will run errands, if so will leave by " 9:45am  Afternoon  -Reading/nap/quiet   -preps dinner      Adjustments/ideas for schedule:  -Getting out of house is great for me mentally  -Schedule eating times?  -Light Therapy/box  -Sometimes will get so into work, that works through pain, which makes pain worse, so need to identify and listen to warning signs    Adjustments in the next week:  -schedule breakfast --sit while eating  -Build in options into my schedule (this or that; work or household chores or self-care)  -Light therapy for one week (and will re-assess)  -15 mins of daily perspective taking (write in notes and then delete; bullet points)    Tasks for next two weeks  -Attend/Sign up for [facility] (meaning and purpose, way to give back/be of service)    Care Plan review completed: Yes    Medication Review:  No changes to current psychiatric medication(s)    Medication Compliance:  Yes    Changes in Health Issues:   None reported    Chemical Use Review:   Substance Use: decrease in alcohol .  Patient reports frequency of use --none.  Stage of Change: Action        Tobacco Use: No current tobacco use.      Assessment: Current Emotional / Mental Status (status of significant symptoms):  Risk status (Self / Other harm or suicidal ideation)  Patient has had a history of suicidal ideation: none recent  Patient denies current fears or concerns for personal safety.  Patient denies current or recent suicidal ideation or behaviors.  Patient denies current or recent homicidal ideation or behaviors.  Patient denies current or recent self injurious behavior or ideation.  Patient denies other safety concerns.  A safety and risk management plan has not been developed at this time, however patient was encouraged to call [facility] / [facility] should there be a change in any of these risk factors.    Appearance:   Appropriate   Eye Contact:   Good   Psychomotor Behavior: Normal   Attitude:   Cooperative   Orientation:   All   Speech   Rate /  "Production: Normal    Volume:  Normal   Mood:    Normal  Affect:    Appropriate   Thought Content:  Clear   Thought Form:  Coherent  Logical   Insight:    Fair     Diagnoses:  1. Major depressive disorder, recurrent episode, moderate (H)        Collateral Reports Completed:  Routed note to PCP    Plan: (Homework, other):  Patient was given information about behavioral services and encouraged to schedule a follow up appointment with the clinic [facility] in 2 weeks.  She was also given information about mental health symptoms and treatment options  and strategies to maintain sobriety.  CD Recommendations: Maintain Sobriety.  Shawn Ullrich [facility], [facility]      ______________________________________________________________________    Integrated Primary Care Behavioral Health Treatment Plan    Patient's Name: Doris Fritz  YOB: 1970    Date: 1/14/2020    DSM-V Diagnoses: 296.32 (F33.1) Major Depressive Disorder, Recurrent Episode, Moderate With anxious distress, 307.5 (F50.2) Bulimia Nervosa   In partial remission  Severity: Moderate or Substance-Related & Addictive Disorders Alcohol Use Disorder   303.90 (F10.20) Moderate In early remission,   Psychosocial / Contextual Factors:   History of Bulimia, Depression, and Alcohol Use  Fibromyalgia  Currently prescribed, taking and benefiting from Naltrexone  No Alcohol use in several weeks; no cravings to use alcohol  Decreased cravings for sugar  Wants to work on recovery    WHODAS: See screenings     Referral / Collaboration:  Referral to another professional/service is not indicated at this time..    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     I will know I've met my goal when\": Doris feels that she contributing.         Objective #A (Patient Action)    Patient will Decrease " frequency and intensity of feeling down, depressed, hopeless.  Status: New - Date: 1/14/20     Intervention(s)  [facility] will assign homework exercise 4 days per week.    Objective #B  Patient will Identify negative self-talk and behaviors: challenge core beliefs, myths, and actions.  Status: New - Date: 1/14/20     Intervention(s)  [facility] will teach the client how to perform a behavioral chain analysis. including Cognitive Distortions and Automatic Negative Thoughts.    Objective #C  Patient will maintain sobriety for 90 days  Increase interest, engagement, and pleasure in doing things.  Status: New - Date: 1/14/20     Intervention(s)  Therapist will assign homework to create daily schedule .        Patient has reviewed and agreed to the above plan.      Shawn G. Ullrich, ROSEMARY  January 14, 2020

## 2020-01-29 ASSESSMENT — ANXIETY QUESTIONNAIRES: GAD7 TOTAL SCORE: 13

## 2020-03-01 ENCOUNTER — HEALTH MAINTENANCE LETTER (OUTPATIENT)
Age: 50
End: 2020-03-01

## 2020-05-15 NOTE — PROGRESS NOTES
Family Medicine Video Visit Note  Doris Fritz is being evaluated via a billable video visit.    Consent documented below.  Chief Complaint   Patient presents with     Fatigue     getting worse     weight gain     gone up a couple of sizes but has not weighed       Fatigue  Doris is a 49 yo woman with hx of anxiety /depression (followed by psychiatry), hx of bulimia, chronic constipation, chronic fatigue, fibromyalgia, multiple food sensitivities, and weight gain.     She reports chronic fatigue, hx of fibromyalgia. She reports she has tried and failed gabapentin, lyrica and duloxetine. All medications caused too many side effects. She has gone to [facility] for pool therapy , PT and chronic pain management. She is unsure if it was helpful. She reports poor sleep but uses Trazodone consistently. She tried one session of acupuncture which gave temporary relief. Doris reports poor exercise tolerance. She can walk up to 2 miles a day but feels tired afterward. She reports she tried to do modified push ups but could not do more than 8, no stamina.     Sleep is fair. She has hot flashes that sometime interferes with sleep. She is followed by psychiatry for depression and anxiety and takes fluoxetine 20mg daily and trazodone 25mg at bedtime. She has not discussed symptoms with her psychiatrist. She has a counselor but has not touched base for at least a month.     She reports chronic pain in her neck, shoulder, hip and low back. Has worked with a PT in the past. Some stretching exercises provided temporary relief.     Doris reports she took [place] thyroid for many years when she was seeing a doctor at [facility]. She has no history of thyroid disease, however, she would like to resume this medication.    I checked a TSH for Doris last August and it was slightly suppressed at 0.28. T4 was normal. I referred her on to endocrinology for further evaluation. They rechecked thyroid studies and adrenal  studies which were all normal.  Doris reports there is a strong family history of thyroid disease.     Weight gain  Doris has a hx of bullemia and still reports she struggles with her weight. She reports having increased by 2 sizes since last year. Does not know her current weight. Diet is typically healthy, she eat 1-2 meals per day. Reports she never feels hungry. Reports she was seen in the past at the [facility] for evaluation and treatment, however this did not find this helpful. She reports she could not afford to go to further sessions. She reports she has [facility] Did not find that helpful. BMI has been in the 22 range so she does not meet criteria to be seen at the weight loss clinic. She avoids gluten and dairy.       Chronic constipation  Doris has struggled with chronic constipation and reports she uses Magnesium citrate daily. She has been following with Dr. Johnny Grigsby. He was treating her for mold exposure and gave her a belia solution but this worsened her constipation so she stopped taking it.     She would like to resume armour thyroid and increase to effect  Last took it at [facility] 2019    Patient Active Problem List   Diagnosis     Urethral diverticulum     Chronic constipation     Anxiety and depression     Chronic fatigue     Gastrointestinal food sensitivity     Fibromyalgia     Weight gain     Recurrent major depressive disorder (H)     Major depressive disorder, recurrent episode, moderate (H)     Bulimia nervosa     Uncomplicated alcohol dependence (H)     Reviewed and updated as needed this visit by Provider            Review of Systems:     Constitutional, HEENT, cardiovascular, pulmonary, gi and gu systems are negative, except as otherwise noted.         Physical Exam:     LMP  (LMP Unknown)  2.5 yr ago    GENERAL: Healthy, alert and no distress        Assessment and Plan   (R53.83) Other fatigue  (primary  "encounter diagnosis)  Comment: persistent, chronic fatigue,likely multifactorial, pt is convinced this will be treated with [place] thyroid, though she has no history of thyroid disease  Plan: TSH with free T4 reflex, CBC with Plt ([facility])        Indicated I would be happy to check levels, also check CBC to rule out anemia. She was informed that if numbers are in normal range, that no medication would be prescribed. Discussed self cares, activity, stretches daily, attention to sleep and tweaking psych meds.     (Z13.220) Lipid screening  Comment: she wishes to come in for fasting lipid profile  Plan: Lipid Panel ([facility])        Lab is ordered    (F41.9,  F32.9) Anxiety and depression  Comment: under the care of psychiatry  Plan: defer care to psychiatrist. Recommend follow up visit with therapist    (M79.7) Fibromyalgia  Comment: longstanding diagnosis, has failed multiple medications, modalities, pool therapy, PT, acupuncture  Plan: discussed self cares, activity as tolerated. Reconnecting with therapist, consider chronic pain clinic though she has done this in the past and did not find it helpful    (R63.5) Weight gain  Comment: pt vague about how much weight has been gained. Weight stable all last year at 161, BMI 23. She has hx of bullemia and went to [facility] program, does not wish to return, does not wish to work with nutritionist  Plan: recommend calorie counting, she may contact me if she wishes to speak with our nutritionist.      After Visit Information:  Patient chose to view AVS via Vertigo      Lenora Do MD      Video-Visit Details         Video Visit Consent     The patient has been notified of following:     \"This video visit will be conducted via a call between you and your physician/provider. We have found that certain health care needs can be provided without the need for an in-person physical exam.  This service lets us provide the care you need with a video conversation.  If a " "prescription is necessary we can send it directly to your pharmacy.  If lab work is needed we can place an order for that and you can then stop by our lab to have the test done at a later time.    Video visits are billed at different rates depending on your insurance coverage.  Please reach out to your insurance provider with any questions.    If during the course of the call the physician/provider feels a video visit is not appropriate, you will not be charged for this service.\"    Patient has given verbal consent for Video visit? Yes    How would you like to obtain your AVS? [facility]    Patient would like the video invitation sent by: Text to cell phone: [phone]    Will anyone else be joining your video visit? No        Type of service:  Video Visit  Video Start Time: 4:35 PM   THIS is the time provider and patient connect.  Video End Time (time video stopped): 5:30 pm  Total time is 40 minutes    Originating Location (pt. Location): Home    Distant Location (provider location):  [facility]     Mode of Communication:  Video Conference via [employer]                            "

## 2020-05-18 ENCOUNTER — VIRTUAL VISIT (OUTPATIENT)
Dept: FAMILY MEDICINE | Facility: CLINIC | Age: 50
End: 2020-05-18
Payer: COMMERCIAL

## 2020-05-18 DIAGNOSIS — Z13.220 LIPID SCREENING: ICD-10-CM

## 2020-05-18 DIAGNOSIS — F32.A ANXIETY AND DEPRESSION: ICD-10-CM

## 2020-05-18 DIAGNOSIS — R53.83 OTHER FATIGUE: Primary | ICD-10-CM

## 2020-05-18 DIAGNOSIS — M79.7 FIBROMYALGIA: ICD-10-CM

## 2020-05-18 DIAGNOSIS — F41.9 ANXIETY AND DEPRESSION: ICD-10-CM

## 2020-05-18 DIAGNOSIS — R63.5 WEIGHT GAIN: ICD-10-CM

## 2020-05-27 DIAGNOSIS — R53.83 OTHER FATIGUE: ICD-10-CM

## 2020-05-27 DIAGNOSIS — Z13.220 LIPID SCREENING: ICD-10-CM

## 2020-05-27 LAB
CHOLEST SERPL-MCNC: 172 MG/DL (ref 0–200)
CHOLEST/HDLC SERPL: 2.7 {RATIO} (ref 0–5)
ERYTHROCYTE [DISTWIDTH] IN BLOOD BY AUTOMATED COUNT: 11.9 %
FASTING SPECIMEN: YES
HCT VFR BLD AUTO: 42.4 % (ref 35–47)
HDLC SERPL-MCNC: 64 MG/DL
HEMOGLOBIN: 13.9 G/DL (ref 11.7–15.7)
LDLC SERPL CALC-MCNC: 97 MG/DL (ref 0–129)
MCH RBC QN AUTO: 29.5 PG (ref 26.5–35)
MCHC RBC AUTO-ENTMCNC: 32.8 G/DL (ref 32–36)
MCV RBC AUTO: 90 FL (ref 78–100)
PLATELET # BLD AUTO: 231 K/UL (ref 150–450)
RBC # BLD AUTO: 4.71 M/UL (ref 3.8–5.2)
TRIGL SERPL-MCNC: 54 MG/DL (ref 0–150)
TSH SERPL DL<=0.005 MIU/L-ACNC: 1.32 MU/L (ref 0.4–4)
VLDL-CHOLESTEROL: 11 (ref 7–32)
WBC # BLD AUTO: 4.6 K/UL (ref 4–11)

## 2020-12-08 ENCOUNTER — VIRTUAL VISIT (OUTPATIENT)
Dept: FAMILY MEDICINE | Facility: OTHER | Age: 50
End: 2020-12-08
Payer: COMMERCIAL

## 2020-12-08 DIAGNOSIS — Z20.822 SUSPECTED 2019 NOVEL CORONAVIRUS INFECTION: ICD-10-CM

## 2020-12-08 DIAGNOSIS — Z20.822 SUSPECTED 2019 NOVEL CORONAVIRUS INFECTION: Primary | ICD-10-CM

## 2020-12-08 PROCEDURE — U0003 INFECTIOUS AGENT DETECTION BY NUCLEIC ACID (DNA OR RNA); SEVERE ACUTE RESPIRATORY SYNDROME CORONAVIRUS 2 (SARS-COV-2) (CORONAVIRUS DISEASE [COVID-19]), AMPLIFIED PROBE TECHNIQUE, MAKING USE OF HIGH THROUGHPUT TECHNOLOGIES AS DESCRIBED BY CMS-2020-01-R: HCPCS | Performed by: FAMILY MEDICINE

## 2020-12-08 PROCEDURE — 99421 OL DIG E/M SVC 5-10 MIN: CPT | Performed by: FAMILY MEDICINE

## 2020-12-08 NOTE — PROGRESS NOTES
"Date: 2020 11:07:07  Clinician: Donn Ramirez  Clinician NPI: [id number]  Patient: malvin jasso  Patient : 1970  Patient Address: [address] [place] [id number]  Patient Phone: [phone]  Visit Protocol: URI  Patient Summary:  malvin is a 50 year old ( : 1970 ) female who initiated a [facility] Visit for COVID-19 (Coronavirus) evaluation and screening. When asked the question \"Please sign me up to receive news, health information and promotions from [facility].\", malvin responded \"No\".    malvin states her symptoms started suddenly 3-4 days ago. After her symptoms started, they improved and then got worse again.   Her symptoms consist of a headache, nasal congestion, rhinitis, myalgia, malaise, and a sore throat.   Symptom details     Nasal secretions: The color of her mucus is white and clear.    Sore throat: malvin reports having mild throat pain (1-3 on a 10 point pain scale), does not have exudate on her tonsils, and can swallow liquids. She is not sure if the lymph nodes in her neck are enlarged. A rash has not appeared on the skin since the sore throat started.     Headache: She states the headache is mild (1-3 on a 10 point pain scale).      malvin denies having ear pain, wheezing, fever, cough, nausea, vomiting, facial pain or pressure, chills, teeth pain, ageusia, diarrhea, and anosmia. She also denies taking antibiotic medication in the past month, having recent facial or sinus surgery in the past 60 days, and having a sinus infection within the past year. She is not experiencing dyspnea.   Precipitating events  Within the past week, malvin has not been exposed to someone with strep throat. She has not recently been exposed to someone with influenza. malvin has not been in close contact with any high risk individuals.   Pertinent COVID-19 (Coronavirus) information  malvin does not work or volunteer as healthcare worker or a . In the past 14 " days, malvin has not worked or volunteered at a healthcare facility or group living setting.   In the past 14 days, she also has not lived in a congregate living setting.   mlavin has not had a close contact with a laboratory-confirmed COVID-19 patient within 14 days of symptom onset.    Since December 2019, malvin has not been tested for COVID-19 and has not had upper respiratory infection or influenza-like illness.   Pertinent medical history  malvin typically gets a yeast infection when she takes antibiotics. She is not sure if she has used fluconazole (Diflucan) to treat previous yeast infections.   She has not been told by her provider to avoid NSAIDs.   malvin does not have diabetes. She denies having immunosuppressive conditions (e.g., chemotherapy, HIV, organ transplant, long-term use of steroids or other immunosuppressive medications, splenectomy). She does not have severe COPD and congestive heart failure. She does not have asthma.   malvin does not need a return to work/school note.   Weight: 168 lbs   malvin does not smoke or use smokeless tobacco.   Weight: 168 lbs    MEDICATIONS: fluoxetine oral, trazodone oral, bupropion HCl oral, ALLERGIES: NKDA  Clinician Response:  Dear malvin,   Your symptoms show that you may have coronavirus (COVID-19). This illness can cause fever, cough and trouble breathing. Many people get a mild case and get better on their own. Some people can get very sick.  What should I do?  We would like to test you for this virus.   1. Please call [phone] to schedule your visit. Explain that you were referred by [facility] to have a COVID-19 test. Be ready to share your [facility] visit ID number.  * If you need to schedule in [facility] please call [phone] or for [employer] employees please call [phone].  * If you need to schedule in the [employer] area please call [phone]. [employer] employees call [phone].  The following will serve as your written order  "for this COVID Test, ordered by me, for the indication of suspected COVID [Z20.828]: The test will be ordered in [employer], our electronic health record, after you are scheduled. It will show as ordered and authorized by Lio Guerrero MD.  Order: COVID-19 (Coronavirus) PCR for SYMPTOMATIC testing from [facility].   2. When it's time for your COVID test:  Stay at least 6 feet away from others. (If someone will drive you to your test, stay in the backseat, as far away from the  as you can.)   Cover your mouth and nose with a mask, tissue or washcloth.  Go straight to the testing site. Don't make any stops on the way there or back.      3.Starting now: Stay home and away from others (self-isolate) until:   You've had no fever---and no medicine that reduces fever---for one full day (24 hours). And...   Your other symptoms have gotten better. For example, your cough or breathing has improved. And...   At least 10 days have passed since your symptoms started.       During this time, don't leave the house except for testing or medical care.   Stay in your own room, even for meals. Use your own bathroom if you can.   Stay away from others in your home. No hugging, kissing or shaking hands. No visitors.  Don't go to work, school or anywhere else.    Clean \"high touch\" surfaces often (doorknobs, counters, handles, etc.). Use a household cleaning spray or wipes. You'll find a full list of  on the EPA website: www.epa.gov/pesticide-registration/list-n-disinfectants-use-against-sars-cov-2.   Cover your mouth and nose with a mask, tissue or washcloth to avoid spreading germs.  Wash your hands and face often. Use soap and water.  Caregivers in these groups are at risk for severe illness due to COVID-19:  o People 65 years and older  o People who live in a nursing home or long-term care facility  o People with chronic disease (lung, heart, cancer, diabetes, kidney, liver, immunologic)  o People who have a weakened immune system, " including those who:   Are in cancer treatment  Take medicine that weakens the immune system, such as corticosteroids  Had a bone marrow or organ transplant  Have an immune deficiency  Have poorly controlled HIV or AIDS  Are obese (body mass index of 40 or higher)  Smoke regularly   o Caregivers should wear gloves while washing dishes, handling laundry and cleaning bedrooms and bathrooms.  o Use caution when washing and drying laundry: Don't shake dirty laundry, and use the warmest water setting that you can.  o For more tips, go to www.cdc.gov/coronavirus/2019-ncov/downloads/10Things.pdf.    4.Sign up for imagine. We know it's scary to hear that you might have COVID-19. We want to track your symptoms to make sure you're okay over the next 2 weeks. Please look for an email from imagine---this is a free, online program that we'll use to keep in touch. To sign up, follow the link in the email. Learn more at [email or web address]  How can I take care of myself?   Get lots of rest. Drink extra fluids (unless a doctor has told you not to).   Take Tylenol (acetaminophen) for fever or pain. If you have liver or kidney problems, ask your family doctor if it's okay to take Tylenol.   Adults can take either:    650 mg (two 325 mg pills) every 4 to 6 hours, or...   1,000 mg (two 500 mg pills) every 8 hours as needed.    Note: Don't take more than 3,000 mg in one day. Acetaminophen is found in many medicines (both prescribed and over-the-counter medicines). Read all labels to be sure you don't take too much.   For children, check the Tylenol bottle for the right dose. The dose is based on the child's age or weight.    If you have other health problems (like cancer, heart failure, an organ transplant or severe kidney disease): Call your specialty clinic if you don't feel better in the next 2 days.       Know when to call 911. Emergency warning signs include:    Trouble breathing or shortness of breath Pain  or pressure in the chest that doesn't go away Feeling confused like you haven't felt before, or not being able to wake up Bluish-colored lips or face.  Where can I get more information?   [facility] -- About COVID-19: [email or web address]   CDC -- What to Do If You're Sick: www.cdc.gov/coronavirus/2019-ncov/about/steps-when-sick.html   CDC -- Ending Home Isolation: www.cdc.gov/coronavirus/2019-ncov/hcp/disposition-in-home-patients.html   CDC -- Caring for Someone: www.cdc.gov/coronavirus/2019-ncov/if-you-are-sick/care-for-someone.html   [facility] -- Interim Guidance for Hospital Discharge to Home: www.health.[facility].mn./diseases/coronavirus/hcp/hospdischarge.pdf   [facility] clinical trials (COVID-19 research studies): clinicalaffairs.[facility]-clinical-trials    Below are the COVID-19 hotlines at the [facility] ([facility]). Interpreters are available.    For health questions: Call 216-645-3158 or 1-798.515.1477 (7 a.m. to 7 p.m.) For questions about schools and childcare: Call 169-440-5323 or 1-355.327.2781 (7 a.m. to 7 p.m.)    Diagnosis: Contact with and (suspected) exposure to other viral communicable diseases  Diagnosis ICD: Z20.828

## 2020-12-09 LAB
SARS-COV-2 RNA SPEC QL NAA+PROBE: NOT DETECTED
SPECIMEN SOURCE: NORMAL

## 2020-12-14 ENCOUNTER — HEALTH MAINTENANCE LETTER (OUTPATIENT)
Age: 50
End: 2020-12-14

## 2021-04-17 ENCOUNTER — HEALTH MAINTENANCE LETTER (OUTPATIENT)
Age: 51
End: 2021-04-17

## 2021-05-31 VITALS — WEIGHT: 154 LBS | BODY MASS INDEX: 22.1 KG/M2

## 2021-05-31 VITALS — WEIGHT: 158 LBS | BODY MASS INDEX: 22.67 KG/M2

## 2021-05-31 VITALS — WEIGHT: 154.5 LBS | BODY MASS INDEX: 21.86 KG/M2

## 2021-06-01 VITALS — WEIGHT: 156.5 LBS | HEIGHT: 71 IN | BODY MASS INDEX: 21.91 KG/M2

## 2021-06-01 VITALS — BODY MASS INDEX: 22.4 KG/M2 | WEIGHT: 160 LBS | HEIGHT: 71 IN

## 2021-06-02 VITALS — WEIGHT: 155.2 LBS | BODY MASS INDEX: 21.73 KG/M2 | HEIGHT: 71 IN

## 2021-06-13 NOTE — PROGRESS NOTES
FEMALE ADULT PREVENTIVE EXAM    CHIEF COMPLAINT:  Female preventive exam.    SUBJECTIVE:  Doris Fritz is a 47 y.o. female who presents for her routine physical exam.    Patient would like to address the following concerns today:   She is going to see a naturopath, Dr. Enma Hernandez.  She will need to be getting some blood work.  About 3-4 months ago she was getting very tired during the day and needing to take a nap or go to bed early.  She has seen a psychiatrist who is prescribing her Prozac and Trazodone.  She takes Vicodan as needed for muscle pain.  Needs to use it when she is traveling.      GYNE HISTORY  Menses: irregular- she will sometimes skip periods, gets bleeding every 40-50 days  Sexually Active: yes  Contraception: vasectomy  Last Pap: 2016 normal with negative HPV  Abnormal Pap: yes      She  has no past medical history on file.    Lab Results   Component Value Date    WBC 5.5 10/27/2015    HGB 15.1 10/27/2015    HCT 46.0 10/27/2015    MCV 95 10/27/2015     10/27/2015     10/27/2015    K 4.5 10/27/2015    BUN 15 10/27/2015     Lab Results   Component Value Date    CHOL 198 10/24/2013    HDL 65 10/24/2013    LDLCALC 121 10/24/2013    TRIG 61 10/24/2013     Lab Results   Component Value Date    TSH 1.95 10/27/2015     BP Readings from Last 3 Encounters:   10/30/17 110/82   07/22/16 102/68   04/29/16 92/70       Surgeries:    Past Surgical History:   Procedure Laterality Date     REDUCTION MAMMAPLASTY         Family History:  Her family history is not on file.    Social History:  She  reports that she has never smoked. She has never used smokeless tobacco. She reports that she drinks about 1.2 oz of alcohol per week  She reports that she does not use illicit drugs. Lives with [facility].  Has not been working due to her medical problems.  Alcohol-2 drinks/ week.    Medications:    Current Outpatient Prescriptions:      CANASA 1,000 mg suppository, daily., Disp: , Rfl:      FLUoxetine  (PROZAC) 20 MG capsule, Take 2 po daily, Disp: 60 capsule, Rfl: 5     HYDROcodone-acetaminophen (VICODIN) 5-500 mg per tablet, TAKE 1-2 TABLETS BY MOUTH EVERY 4 HOURS PRN FOR PAIN. NO MORE THAN 8 TABS PER 24 HRS, Disp: , Rfl:      L. RHAMNOSUS GG/INULIN (CULTURELLE PROBIOTICS ORAL), Take by mouth., Disp: , Rfl:      magnesium 30 mg tablet, Take by mouth daily., Disp: , Rfl:      traZODone (DESYREL) 50 MG tablet, TAKE 1 TO 2 TABLETS BY MOUTH DAILY AT BEDTIME, Disp: 180 tablet, Rfl: 3  HELD MEDICATIONS: None.      Allergies:  No latex allergies.  No Known Allergies         RISK BEHAVIOR & HEALTH HABITS  Regular Exercise: some walking.  Balanced diet: no.  Calcium/vitamin D: no  Stress management: no  Seat Belt Use: yes    Mammogram: 2016 normal  Dental Care: YES    REVIEW OF SYSTEMS:  Complete head to toe review of systems is otherwise negative except as above.    OBJECTIVE:  VITAL SIGNS:    Vitals:    10/30/17 1311   BP: 110/82   Pulse: 86   Resp: 16   SpO2: 98%     GENERAL:  Patient alert, in no acute distress.  EYES: PERRLA. Extraoccular movements intact, pupils equal, reactive to light and accommodation.  Normal conjunctiva and lids.   ENT:  Hearing grossly normal.  Normal appearance to ears and nose.  Bilateral TM s, external canals, oropharynx normal. Normal lips, gums and teeth.   NECK:  Supple, without thyromegaly or mass.  RESP:  Clear to auscultation without crackles, wheezes or distress.  Normal respiratory effort.   CV:  Regular rate and rhythm without murmurs, rubs or gallops.  Normal pedal pulses.  No varicosities or edema.  ABDOMEN:  Soft, non-tender, without hepatosplenomegaly, masses, or hernias.   BREASTS:  Nontender, without masses, nipple discharge, erythema, or axillary adenopathy.    LYMPHATIC: No cervical or axillary lymphadenopathy.  No bruising.  NEURO:  CN II-XII intact, motor & sensory function all intact.  DTR and reflexes normal.  PSYCHIATRIC:  Alert & oriented with normal mood and affect.   Good judgment and insight.  SKIN:  Normal inspection and palpation.  MUSCULOSKELETAL: Normal gait and station.  - Spine / Ribs / Pelvis: Normal inspection, ROM, stability and strength: Spine, Head, Neck, Upper and Lower Extremities.      Doris was seen today for annual exam, medication refill and fatigue.    Diagnoses and all orders for this visit:    Doris was seen today for annual exam, medication refill and fatigue.    Diagnoses and all orders for this visit:    Routine general medical examination at a health care facility    Visit for screening mammogram  -     Mammo Screening Bilateral; Future    Hypothyroidism  -     T3 (Triiodothyronine), Free  -     T4, Free  -     Thyroid Stimulating Hormone (TSH)    Vitamin D deficiency  -     Vitamin D, Total (25-Hydroxy)    Chronic fatigue syndrome  -     Comprehensive Metabolic Panel  -     Cancel: HM2(CBC w/o Differential)  -     Magnesium, Red Blood Cell  -     Zinc, Serum  -     C -Reactive Protein, High Sensitivity  -     Erythrocyte Sedimentation Rate  -     Iron and Transferrin Iron Binding Capacity  -     Urinalysis-UC if Indicated  -     HM2(CBC w/o Differential)    Ulcerative Proctitis- She will be working with naturopath to work on her digestion    Encounter for screening for other metabolic disorders  -     Lipid Cascade    Fibromyalgia - She signed controlled substance contract. She takes the vicodan sparingly so was given #30 for a year.  -     HYDROcodone-acetaminophen 5-325 mg per tablet; Take 1/2 to 1 po daily as needed for severe muscle pain    Kathleen Salazar

## 2021-06-15 NOTE — PROGRESS NOTES
Assessment & Plan   1. Fatigue:  Repeat labs today at patient's request, will contact with results.  She has no other signs or symptoms to suggest etiology for fatigue.  Discussed this may be a flare of her chronic fatigue or could possibly be related to initiation of exogenous progesterone.    - Thyroid Cascade  - T3 (Triiodothyronine), Free  - T4, Free  - HM2(CBC w/o Differential)  - Ferritin  - Erythrocyte Sedimentation Rate  - C-Reactive Protein  - Vitamin B12  - Folate, Serum  - Comprehensive Metabolic Panel  - Mononucleosis Screen    Samantha Aguilera CNP    Subjective   Chief Complaint:  URI (fatigue, dry non productive cough, throat tightness x several weeks)    HPI:   Doris Fritz is a 47 y.o. female who presents for evaluation of cough, fatigue.    She is a patient of Dr. Salazar. PMH notable for chronic fatigue, fibromyalgia, IBS.      Her main concern today is fatigue.  She states since for the past 2.5 months she has experienced prominent fatigue that is different from her baseline chronic fatigue.  Is not somnolent, simply devoid of energy.  Low tolerance for exertion.  She has been following with her naturopath as well and is working with some supplements and dietary changes to try to help optimize this.  She had a full panel of labs at her physical in October and requests recheck of thyroid today.  States she has a significant FMH of thyroid disease in mother, sister, MGM.  She has noted weight gain as well that is unrelated to dietary changes.  She denies fever, night sweats, weight loss, shortness of breath, edema, abdominal discomfort, joint pain, dysuria.  She has experienced a dry cough and scratchy throat over the past two weeks though does not find this particularly bothersome.      She denies any changes in mood or recent stressors.  Overall feels happy and life is stable.     Of note, she did start a topical progesterone three months ago that she states has helped with perimenopausal  vasomotor symptoms.      PMH:   Patient Active Problem List   Diagnosis     Fibromyalgia     Chronic Fatigue Syndrome     Hypothyroidism     Vitamin D Deficiency     Endometriosis     Irritable Bowel Syndrome     Ulcerative Proctitis     Joint Pain, Localized In The Hip     Neck Pain       No past medical history on file.    Current Medications:   Current Outpatient Prescriptions on File Prior to Visit   Medication Sig Dispense Refill     FLUoxetine (PROZAC) 20 MG capsule Take 2 po daily 60 capsule 5     HYDROcodone-acetaminophen 5-325 mg per tablet Take 1/2 to 1 po daily as needed for severe muscle pain 30 tablet 0     ketoconazole (NIZORAL) 2 % cream Apply daily to affected area of navel 15 g 1     traZODone (DESYREL) 50 MG tablet TAKE 1 TO 2 TABLETS BY MOUTH DAILY AT BEDTIME 180 tablet 3     CANASA 1,000 mg suppository daily.       L. RHAMNOSUS GG/INULIN (CULTURELLE PROBIOTICS ORAL) Take by mouth.       magnesium 30 mg tablet Take by mouth daily.       No current facility-administered medications on file prior to visit.        Allergies:  has No Known Allergies.    SH/FH:  Social History and Family History reviewed and updated.   Tobacco Status:  She  reports that she has never smoked. She has never used smokeless tobacco.    Review of Systems:  A complete head to toe ROS is negative unless otherwise noted in HPI    Objective   There were no vitals filed for this visit.  Wt Readings from Last 3 Encounters:   12/26/17 154 lb (69.9 kg)   10/30/17 154 lb 8 oz (70.1 kg)   07/22/16 155 lb 12 oz (70.6 kg)       Physical Exam:  GENERAL: Alert, well-appearing female  PSYCH: Pleasant mood, affect appropriate.    SKIN: No atypical lesions  EYES: Conjunctiva pink, sclera white, no exudates.   EARS: TMs pearly grey, no bulging, redness, retraction.   NOSE: Nares patent, no discharge.    MOUTH: Pharynx moist, pink without exudate. No tonsillar enlargement  NECK: No lymphadenopathy. Thyroid borders smooth without enlargement,  nodules.   CV: Regular rate and rhythm without murmurs  RESP: Lung sounds clear  ABDOMEN: BS+. Abdomen soft, nontender to palpation without guarding. No organomegaly, masses or hernias  PV : LEs warm, pink, symmetric hair distribution. No edema, tenderness or varicosities.   NEURO: Alert, oriented x3  Normal motor and sensory    Labs:    No results found for this or any previous visit (from the past 168 hour(s)).

## 2021-06-15 NOTE — PROGRESS NOTES
Assessment and Plan    1. Fungal dermatitis  - ketoconazole (NIZORAL) 2 % cream; Apply daily to affected area of navel  Dispense: 15 g; Refill: 1    2. Nasal crusting  Exam normal today - suggested trial of  - mupirocin (BACTROBAN) 2 % ointment; Apply to affected area 3 times daily  Dispense: 22 g; Refill: 0    3. Hot flashes  Discussed different therapies and risks including Estrogen Replacement Therapy (not recommended unless severe), antidepressants, alternative therapies (black cohosh, chaste berry.  She is interested in progesterone cream - I am not able to prescribe this from our database, but said I would talk with her PCP Dr. Salazar.  I also mentioned PT as an option for dyspareunia.  See subsequent [facility] message I sent her as a follow up :    Marvin George,     I talked to Dr. Salazar later and she shared with me a book on menopause which she likes - here is a link:     [email or web address]/[email or web address]-hormone-cure     I am only passing on what Dr. Salazar shared with me and what is in the book,  Dr. Emery says wild yam progesterone does not work (and so do other sources).  She is also =/- about progesterone cream - lack of evidence from studies, though there are individual stories of women making them.     I am not able to prescribe a progesterone cream using our database.  You can buy it over the counter in 2% creams (it needs to say USP progesterone - NOT the same as yam base creams, though such creams may have added progesterone - use about 20 mg daily)) or there is a vaginal gel that contains a higher percent or suppository that contains a higher percent.     If you are interested in the vaginal gel or suppository, please let me know.     Radha Sykes MD     ---    Over 25 minutes spent with this patient, over half in counseling      Radha Sykes MD     -------------------------------------------    Chief Complaint   Patient presents with     Hot Flashes     with weight  "gain since last visit with PCP.     Fungal Infection     on navel, going on for 4 months.  Forgot to mention to PCP at last visit.  Has used Hydrocortisone, Monistat 7-makes it worse, essential oils.      Sores/scabs     in nostril for 6 months, not healing.  Swicthes from side to side.      No period since August - \"I know what that means. \" For the first time painful sexual intercourse and hot flashes - all day and all night.  Has had night sweats before, but nothing like this. Also had irregular periods  She is interested in progesterone cream - she went somewhere in [place] 5 years ago for bio identical hormone replacement - not effective at the time for irregular menses, some night sweats.  She was able to manage those symptoms other ways.  The worst thing about the night sweats is that they interfere with her sleep.  She has Other issues that cause sleep problems - bowel digestion problems    I did mentioned PT for dyspareunia - she says she will be getting pelvic floor therapy for constipation     Has been on Prozac for years - has tried Paxil and Effexor over the years - Prozac most effective     She will be seeing a naturopath after this appointment          Other concerns:  1) redness around navel - worried about a yeast infection.  She has tried monistat and essential oils - does not help  2) sores inside nose that scab up and may bleed a bit    Patient Active Problem List   Diagnosis     Fibromyalgia     Chronic Fatigue Syndrome     Hypothyroidism     Vitamin D Deficiency     Endometriosis     Irritable Bowel Syndrome     Ulcerative Proctitis     Joint Pain, Localized In The Hip     Neck Pain       Current Outpatient Prescriptions on File Prior to Visit   Medication Sig Dispense Refill     CANASA 1,000 mg suppository daily.       FLUoxetine (PROZAC) 20 MG capsule Take 2 po daily 60 capsule 5     HYDROcodone-acetaminophen 5-325 mg per tablet Take 1/2 to 1 po daily as needed for severe muscle pain 30 tablet " 0     magnesium 30 mg tablet Take by mouth daily.       traZODone (DESYREL) 50 MG tablet TAKE 1 TO 2 TABLETS BY MOUTH DAILY AT BEDTIME 180 tablet 3     L. RHAMNOSUS GG/INULIN (CULTURELLE PROBIOTICS ORAL) Take by mouth.       No current facility-administered medications on file prior to visit.            Health Maintenance Due   Topic Date Due     INFLUENZA VACCINE RULE BASED (1) 08/01/2017     MAMMOGRAM  08/08/2017     TD 18+ HE  07/31/2018     Health Maintenance reviewed - reminded to schedule mammogram    History   Smoking Status     Never Smoker   Smokeless Tobacco     Never Used       History   Alcohol Use     1.2 oz/week     2 Glasses of wine per week     Comment: 2-3 drinks weekly      Vitals:    12/26/17 0925   BP: 106/74   Pulse: 79   SpO2: 99%     Body mass index is 21.78 kg/(m^2).     EXAM:    General appearance - alert, well appearing, and in no distress  Mental status - normal mood, behavior, speech, dress, motor activity, and thought processes  Nose - normal and patent, no erythema, discharge or polyps  Skin - erythema howard rodríguez

## 2021-06-20 NOTE — PROGRESS NOTES
"Assessment / Impression     1. Enlargement abdomen  CT Abdomen Pelvis With Oral With IV Contrast   2. Eating disorder     3. Constipation     4. Irritable bowel syndrome             Plan:     For much of the visit initially, patient was adamant that she did not want to hear what her current weight has been.  While initially did not give patient is currently exact weight, I did discuss with her that I reviewed her weight over the last 12-18 months, and has been stable and within normal range.  Patient finally was open to hearing what her current weight was, and was actually surprised that she weighed 155 pounds, because patient thought that she wait at least 165 pounds.  Again, I discussed with patient that not only she not had significant weight gain over the past 12-18 months, her weight is within normal range.  Because patient has continued to worry and obsess about food choices, as well as her weight, I did discuss with her concerns regarding ongoing possible eating disorder, possible dysmorphic body disorder, and I would recommend referral to program such as [facility].  Patient did not want to receive referral today, though states she will think about it.    In regards to patient's increasing abdominal girth, she continues to be quite worried about this.  Given the fact she does have these symptoms, as well as possible bowel changes, we did discuss further evaluation with imaging such as CT scan of abdomen and pelvis to rule out any other possible causes for her change in abdominal girth.  Will inform patient of further plan once we have results.    Patient plans to follow-up with her primary care provider in regard to any ongoing symptoms.  At the end of the visit, patient understood that her participation with me in the weight loss clinic was not necessary, however she still wanted to meet with functional nutritionist at [facility].  For this reason, patient will stay \"enrolled\" in our weight loss " "program so she can meet with [facility], but I will no longer see her as a weight loss patient.  Patient understands, agrees with plan.    Total time 40 minutes counseling time 26 minutes informing patient of her current weight, discussion of plan as outlined above.  Subjective:      HPI: Doris Fritz is a 48 y.o. female, new to me, who was initially scheduled as part of the [facility], patient has a BMI of 22, which I do not believe it is an appropriate candidate for a program, therefore we decided a regular office visit today.  Patient has a history of bulimia and eating disorders, has participated at [facility], meeting with a therapist for up to 6 months at one time.  Over the last 12-18 months, patient believes she has rapidly gained weight in spite of changes to her diet and increase physical activity.  Because of patient's history of eating disorder, today, she admits she actually has not stood on a scale and does not know how much she weighs, however, she \"knows\" she is gaining weight because she has had to change her clothes size.  She has gone from a medium to a large, and she needs to wear size 8 pants, and is now currently wearing size 12 pants.  Today, she is actually wearing maternity pants.  Patient does not feel she is hungry, and also feels she eats relatively healthy by eating \"clean meats\" including organic beef and chicken, and will also use a cane and squash.  She tries to limit dairy, gluten, grains, fruits in her diet.  Patient has also complained of irregular bowels.  She has been constipated for years.  No blood in stool or black tarry stool. She has been seen by gastroenterologist.  She denies any other symptoms such as nausea, vomiting, fevers, chills, sweats.  Her biggest concern today is the fact that her clothes do not fit like they had previously.      Medical History:     Patient Active Problem List   Diagnosis     Fibromyalgia     Chronic " Fatigue Syndrome     Hypothyroidism     Vitamin D Deficiency     Endometriosis     Irritable Bowel Syndrome     Ulcerative Proctitis     Joint Pain, Localized In The Hip     Neck Pain       No past medical history on file.    Past Surgical History:   Procedure Laterality Date     REDUCTION MAMMAPLASTY         Current Medications:     Current Outpatient Prescriptions   Medication Sig Note     b complex vitamins tablet Take 2 tablets by mouth daily.      CANASA 1,000 mg suppository daily. 1/12/2016: Received from: External Pharmacy Received Sig:      ENZYMES,DIGESTIVE (DIGESTIVE ENZYMES ORAL) Take 1 tablet by mouth 3 (three) times a day with meals.      ERGOCALCIFEROL, VITAMIN D2, (VITAMIN D2 ORAL) Take 25,000 Units by mouth 2 (two) times a week.      FLUoxetine (PROZAC) 20 MG capsule Take 2 po daily (Patient taking differently: 40 mg. Take 2 po daily)      HYDROcodone-acetaminophen 5-325 mg per tablet Take 1/2 to 1 po daily as needed for severe muscle pain      ketoconazole (NIZORAL) 2 % cream Apply daily to affected area of navel      L. RHAMNOSUS GG/INULIN (CULTURELLE PROBIOTICS ORAL) Take by mouth.      magnesium 30 mg tablet Take by mouth daily.      MAGNESIUM GLYCINATE ORAL Take 1 tablet by mouth daily.      multivitamin with minerals (THERA-M) 9 mg iron-400 mcg Tab tablet Take 2 tablets by mouth 2 (two) times a day.      OMEGA-3 FATTY ACIDS (FISH OIL CONCENTRATE ORAL) Take 2 capsules by mouth 2 (two) times a day.      thyroid, pork, (ARMOUR THYROID) 15 mg Tab Take one po in am      traZODone (DESYREL) 50 MG tablet TAKE 1 TO 2 TABLETS BY MOUTH DAILY AT BEDTIME      MAGNESIUM CITRATE ORAL Take 2 tablets by mouth daily.        Family History:     Family History   Problem Relation Age of Onset     Idiopathic pulmonary fibrosis Mother      Hyperthyroidism Mother      COPD Father      Hyperthyroidism Sister      Hypothyroidism Maternal Grandmother      Parkinsonism Maternal Grandfather        Review of Systems  All  "other systems reviewed and are negative.         Social History:     History   Smoking Status     Never Smoker   Smokeless Tobacco     Never Used     Social History     Social History Narrative   She is a business ownder      Objective:     /64 (Patient Site: Right Arm, Patient Position: Sitting, Cuff Size: Adult Regular)  Pulse 64  Resp 16  Ht 5' 10.5\" (1.791 m)  Wt 155 lb 3.2 oz (70.4 kg)  BMI 21.95 kg/m2  Physical Examination: General appearance - alert, well appearing, and in no distress  Eyes: pupils equal and reactive, extraocular eye movements intact  Mouth: mucous membranes moist, pharynx normal without lesions  Neck: supple, no significant adenopathy or thyromegaly  Lungs: clear to auscultation, no wheezes, rales or rhonchi, symmetric air entry  Heart: normal rate, regular rhythm, normal S1, S2, no murmurs.  Abdomen: soft, nontender, nondistended, no masses or organomegaly.  No rebound or guarding.  Neurological: alert, oriented, normal speech, no focal findings or movement disorder noted.    Extremities: No edema, no clubbing or cyanosis  Psychiatric: Normal affect. Does not appear anxious or depressed.    No results found for this or any previous visit (from the past 168 hour(s)).      Celestina Matt MD  9/14/2018  7:31 PM        "

## 2021-06-20 NOTE — PROGRESS NOTES
"SUBJECTIVE: Doris Fritz is a 48 y.o. female with:  Chief Complaint   Patient presents with     Fatigue     Weight Gain    1) Depression - Mood has been worse lately. She saw her psychiatrist who increased her dose of Prozac 2 weeks ago and she feels little better.    2) Weight gain - She has been gradually gaining weight.  She has done intermittent fasting and has cut back on eating but cannot lose weight.  Feels her weight is out of her control and it is affecting her mood.    3) Digestive disorder - She was working with a naturopath and that was not effective.  She had stool tests that showed a lot of inflammation in the gut.  Took a course of supplements/ castor oil packs/ abdominal massage.  She has appointment to see Dr. Grigsby but not until December.    4) Hypothyroidism - Continues on thyroid replacement.  Had normal TFTs in July.    Current supplements: MVI/ vitamin D/ omega 3/ magnesium  SH: .  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    OBJECTIVE: /60 (Patient Site: Left Arm, Patient Position: Sitting, Cuff Size: Adult Regular)  Pulse 77  Ht 5' 10.5\" (1.791 m)  Wt 156 lb 8 oz (71 kg)  SpO2 98%  BMI 22.14 kg/m2 no distress  PSYCH:  Awake, alert, and oriented x 3.  Mood and affect are appropriate.  Good eye contact.  Speech is normal.  No suicidal ideation.  No hallucinations.    PHQ-9: 14    Doris was seen today for fatigue and weight gain.    Diagnoses and all orders for this visit:    Hypothyroidism, unspecified type- Check reverse T3 today to see if that is impacting her thyroid function.    Weight gain- Screen for barriers to weight loss.  -     Triiodothyronine (T3), Reverse  -     C -Reactive Protein, High Sensitivity  -     Glycosylated Hemoglobin A1c  -     Ambulatory referral for Weight Management: [facility]    Digestive disorder- Agree with appointment to see Dr. Grigsby.       Patient Instructions   Check labs today.    I will send salivary cortisol test by " Lam[facility] to your home.    [facility] - [phone]     Kathleen Salazar

## 2021-08-03 ENCOUNTER — APPOINTMENT (OUTPATIENT)
Dept: GENERAL RADIOLOGY | Facility: CLINIC | Age: 51
End: 2021-08-03
Attending: EMERGENCY MEDICINE
Payer: COMMERCIAL

## 2021-08-03 VITALS
TEMPERATURE: 98.1 F | HEIGHT: 71 IN | WEIGHT: 165 LBS | SYSTOLIC BLOOD PRESSURE: 132 MMHG | DIASTOLIC BLOOD PRESSURE: 77 MMHG | RESPIRATION RATE: 20 BRPM | HEART RATE: 73 BPM | BODY MASS INDEX: 23.1 KG/M2 | OXYGEN SATURATION: 97 %

## 2021-08-03 PROCEDURE — 99284 EMERGENCY DEPT VISIT MOD MDM: CPT | Mod: 25

## 2021-08-03 PROCEDURE — 73630 X-RAY EXAM OF FOOT: CPT | Mod: LT

## 2021-08-03 PROCEDURE — 28510 TREATMENT OF TOE FRACTURE: CPT | Mod: T1

## 2021-08-03 ASSESSMENT — MIFFLIN-ST. JEOR: SCORE: 1454.57

## 2021-08-04 ENCOUNTER — HOSPITAL ENCOUNTER (EMERGENCY)
Facility: CLINIC | Age: 51
Discharge: HOME | End: 2021-08-04
Attending: EMERGENCY MEDICINE | Admitting: EMERGENCY MEDICINE
Payer: COMMERCIAL

## 2021-08-04 DIAGNOSIS — S92.502A CLOSED FRACTURE OF PHALANX OF LEFT SECOND TOE, INITIAL ENCOUNTER: ICD-10-CM

## 2021-08-04 PROCEDURE — 250N000013 HC RX MED GY IP 250 OP 250 PS 637: Performed by: EMERGENCY MEDICINE

## 2021-08-04 RX ORDER — HYDROCODONE BITARTRATE AND ACETAMINOPHEN 5; 325 MG/1; MG/1
1 TABLET ORAL ONCE
Status: COMPLETED | OUTPATIENT
Start: 2021-08-04 | End: 2021-08-04

## 2021-08-04 RX ADMIN — HYDROCODONE BITARTRATE AND ACETAMINOPHEN 1 TABLET: 5; 325 TABLET ORAL at 01:01

## 2021-08-04 ASSESSMENT — ENCOUNTER SYMPTOMS
JOINT SWELLING: 0
ARTHRALGIAS: 1

## 2021-08-04 NOTE — ED PROVIDER NOTES
"  History   Chief Complaint:  Toe Pain       HPI   Doris Fritz is a 51 year old female with history of fibromyalgia who presents with left second toe pain. The patient says that she bumped her foot in the dark earlier tonight. She endorses pain but denies any swelling. She denies any loss of consciousness or hitting her head.      Review of Systems   Musculoskeletal: Positive for arthralgias. Negative for joint swelling.   Neurological: Negative for syncope.   All other systems reviewed and are negative.        Allergies:  Tramadol    Medications:  Pyridoxine  Desyrel  Canasa  Magnesium  Ativan  Prozac     Past Medical History:    Depression  Fibromyalgia  Bulimia nervosa  Alcohol dependence  Anxiety   Urethral diverticulum        Past Surgical History:    Abdomen surgery  Breast reduction   Dental surgery  Dilation and curettage      Family History:    Pulmonary fibrosis  COPD  Heart disease   Anxiety  Psoriasis   Thyroid disease      Social History:  Patient presents with family.     Physical Exam     Patient Vitals for the past 24 hrs:   BP Temp Temp src Pulse Resp SpO2 Height Weight   08/03/21 2253 132/77 98.1  F (36.7  C) Oral 73 20 97 % 1.803 m (5' 11\") 74.8 kg (165 lb)       Physical Exam  General: Patient is alert and normal appearing.  HEENT: Head atraumatic   Chest: Heart regular rate and rhythm.   Lungs: Equal clear to auscultation with no wheeze or rales  Abdomen: Soft, non tender, nondistended, normal bowel sounds  Neuro: Grossly nonfocal, normal speech, strength equal bilaterally  Extremities: Left second toe with edema and ecchymosis, distal cap refill less than 2 seconds, toes warm and well-perfused equal radial and DP pulses. No clubbing, cyanosis.  No edema  Skin: Warm and dry with no rash.       Emergency Department Course     Imaging:  Foot XR, G/E 3 views, left  Acute nondisplaced fracture of the proximal shaft of the proximal phalanx of the left second toe. No joint involvement visualized. " No dislocation or radiopaque foreign body.  Reading per radiology       Procedures    Emergency Department Course:    Reviewed:  I reviewed nursing notes, vitals, past medical history and care everywhere       Assessments:  0046 I performed an exam of the patient as documented above.     Interventions:  Norco 1 tablet Oral       Disposition:  The patient was discharged to home.       Impression & Plan     Medical Decision Making:    Doris Fritz is a 51 year old female who presents for evaluation of left second toe pain after running into an item in her closet while it was dark.     CMS is intact distally in the extremity.  Xrays of the foot reveal a nondisplaced proximal shaft fracture of the proximal phalanx of the left second toe fracture that does not need reduction at this time.  The toe was steve taped.  Patient was very concerned as she has had previous nonhealing fractures of her right foot therefore she was given a walking boot for support and comfort.  She has crutches that she brought with her and she is directed to be weightbearing as tolerated.  Rest, ice, elevation was discussed.    The patient/family understand that this may change with time and orthopedic follow-up is indicated.  There is no indication for ortho or podiatry consultation from the ED. Rather, close follow-up is indicated in the next days.  Fracture precautions for home.  The patients head to toe trauma exam is otherwise normal at this time and no further trauma workup is needed as I believe there is no signs of serious head, neck, chest, spinal, extremity or abdominal injuries.  There is no subungual hematoma.            Diagnosis:    ICD-10-CM    1. Closed fracture of phalanx of left second toe, initial encounter  S92.502A        Discharge Medications:  New Prescriptions    No medications on file       Scribe Disclosure:  Rik MEJÍA, am serving as a scribe at 12:40 AM on 8/4/2021 to document services personally performed  by Amy Mejias MD based on my observations and the provider's statements to me.          Amy Mejias MD  08/04/21 0156

## 2021-08-04 NOTE — ED TRIAGE NOTES
[facility]  ED Arrival Note    Arrives through triage. A &O X4. ABC's intact. Pt arrives with c/o L 2nd toe pain after bumping her L foot. Tenderness to the distal joint, no swelling or discoloration      Triage Interventions: X-ray    Ambulatory: Yes    Meets Stroke Criteria?: No    Meets Trauma Criteria?: No    Directed to: Main ED

## 2021-10-02 ENCOUNTER — HEALTH MAINTENANCE LETTER (OUTPATIENT)
Age: 51
End: 2021-10-02

## 2021-11-01 ENCOUNTER — OFFICE VISIT (OUTPATIENT)
Dept: FAMILY MEDICINE | Facility: CLINIC | Age: 51
End: 2021-11-01
Payer: COMMERCIAL

## 2021-11-01 VITALS
WEIGHT: 157 LBS | RESPIRATION RATE: 12 BRPM | HEIGHT: 70 IN | HEART RATE: 66 BPM | BODY MASS INDEX: 22.48 KG/M2 | SYSTOLIC BLOOD PRESSURE: 117 MMHG | OXYGEN SATURATION: 100 % | DIASTOLIC BLOOD PRESSURE: 79 MMHG | TEMPERATURE: 96.7 F

## 2021-11-01 DIAGNOSIS — Z12.11 SCREENING FOR COLON CANCER: ICD-10-CM

## 2021-11-01 DIAGNOSIS — F41.9 ANXIETY AND DEPRESSION: ICD-10-CM

## 2021-11-01 DIAGNOSIS — B07.0 PLANTAR WARTS: ICD-10-CM

## 2021-11-01 DIAGNOSIS — F32.A ANXIETY AND DEPRESSION: ICD-10-CM

## 2021-11-01 DIAGNOSIS — Z00.00 ROUTINE GENERAL MEDICAL EXAMINATION AT A HEALTH CARE FACILITY: Primary | ICD-10-CM

## 2021-11-01 DIAGNOSIS — Z12.31 VISIT FOR SCREENING MAMMOGRAM: ICD-10-CM

## 2021-11-01 DIAGNOSIS — Z13.220 SCREENING FOR LIPID DISORDERS: ICD-10-CM

## 2021-11-01 PROBLEM — E03.9 HYPOTHYROIDISM: Status: ACTIVE | Noted: 2021-11-01

## 2021-11-01 PROBLEM — E55.9 VITAMIN D DEFICIENCY: Status: ACTIVE | Noted: 2021-11-01

## 2021-11-01 PROBLEM — K51.90 ULCERATIVE COLITIS (H): Status: ACTIVE | Noted: 2021-11-01

## 2021-11-01 LAB
ALBUMIN SERPL-MCNC: 4 G/DL (ref 3.4–5)
ALP SERPL-CCNC: 83 U/L (ref 40–150)
ALT SERPL W P-5'-P-CCNC: 16 U/L (ref 0–50)
ANION GAP SERPL CALCULATED.3IONS-SCNC: 3 MMOL/L (ref 3–14)
AST SERPL W P-5'-P-CCNC: 21 U/L (ref 0–45)
BILIRUB SERPL-MCNC: 0.6 MG/DL (ref 0.2–1.3)
BUN SERPL-MCNC: 12 MG/DL (ref 7–30)
CALCIUM SERPL-MCNC: 8.9 MG/DL (ref 8.5–10.1)
CHLORIDE BLD-SCNC: 108 MMOL/L (ref 94–109)
CHOLEST SERPL-MCNC: 170 MG/DL
CO2 SERPL-SCNC: 28 MMOL/L (ref 20–32)
CREAT SERPL-MCNC: 0.84 MG/DL (ref 0.52–1.04)
FASTING STATUS PATIENT QL REPORTED: YES
GFR SERPL CREATININE-BSD FRML MDRD: 80 ML/MIN/1.73M2
GLUCOSE BLD-MCNC: 87 MG/DL (ref 70–99)
HDLC SERPL-MCNC: 57 MG/DL
LDLC SERPL CALC-MCNC: 103 MG/DL
NONHDLC SERPL-MCNC: 113 MG/DL
POTASSIUM BLD-SCNC: 4.8 MMOL/L (ref 3.4–5.3)
PROT SERPL-MCNC: 6.8 G/DL (ref 6.8–8.8)
SODIUM SERPL-SCNC: 139 MMOL/L (ref 133–144)
TRIGL SERPL-MCNC: 52 MG/DL
TSH SERPL DL<=0.005 MIU/L-ACNC: 0.95 MU/L (ref 0.4–4)

## 2021-11-01 PROCEDURE — 84443 ASSAY THYROID STIM HORMONE: CPT | Performed by: FAMILY MEDICINE

## 2021-11-01 PROCEDURE — 80061 LIPID PANEL: CPT | Performed by: FAMILY MEDICINE

## 2021-11-01 PROCEDURE — 82040 ASSAY OF SERUM ALBUMIN: CPT | Performed by: FAMILY MEDICINE

## 2021-11-01 ASSESSMENT — ANXIETY QUESTIONNAIRES
2. NOT BEING ABLE TO STOP OR CONTROL WORRYING: NOT AT ALL
6. BECOMING EASILY ANNOYED OR IRRITABLE: NOT AT ALL
IF YOU CHECKED OFF ANY PROBLEMS ON THIS QUESTIONNAIRE, HOW DIFFICULT HAVE THESE PROBLEMS MADE IT FOR YOU TO DO YOUR WORK, TAKE CARE OF THINGS AT HOME, OR GET ALONG WITH OTHER PEOPLE: NOT DIFFICULT AT ALL
GAD7 TOTAL SCORE: 0
5. BEING SO RESTLESS THAT IT IS HARD TO SIT STILL: NOT AT ALL
1. FEELING NERVOUS, ANXIOUS, OR ON EDGE: NOT AT ALL
7. FEELING AFRAID AS IF SOMETHING AWFUL MIGHT HAPPEN: NOT AT ALL
3. WORRYING TOO MUCH ABOUT DIFFERENT THINGS: NOT AT ALL

## 2021-11-01 ASSESSMENT — PATIENT HEALTH QUESTIONNAIRE - PHQ9
SUM OF ALL RESPONSES TO PHQ QUESTIONS 1-9: 4
5. POOR APPETITE OR OVEREATING: NOT AT ALL

## 2021-11-01 ASSESSMENT — MIFFLIN-ST. JEOR: SCORE: 1403.65

## 2021-11-01 NOTE — NURSING NOTE
"51 year old  Chief Complaint   Patient presents with     Physical     Plantar Wart     right big toe     Menopausal Sx     hot flashes, is post menopausal       Blood pressure 117/79, pulse 66, temperature (!) 96.7  F (35.9  C), temperature source Skin, resp. rate 12, height 1.78 m (5' 10.08\"), weight 71.2 kg (157 lb), SpO2 100 %, not currently breastfeeding. Body mass index is 22.48 kg/m .  Patient Active Problem List   Diagnosis     Urethral diverticulum     Chronic constipation     Anxiety and depression     Chronic fatigue     Gastrointestinal food sensitivity     Fibromyalgia     Weight gain     Recurrent major depressive disorder (H)     Major depressive disorder, recurrent episode, moderate (H)     Bulimia nervosa     Uncomplicated alcohol dependence (H)       Wt Readings from Last 2 Encounters:   11/01/21 71.2 kg (157 lb)   08/03/21 74.8 kg (165 lb)     BP Readings from Last 3 Encounters:   11/01/21 117/79   08/03/21 132/77   12/19/19 115/82         Current Outpatient Medications   Medication     FLUoxetine (PROZAC) 20 MG capsule     LORazepam (ATIVAN) 0.5 MG tablet     MAGNESIUM PO     multivitamin, therapeutic with minerals (THERA-VIT-M) TABS tablet     traZODone (DESYREL) 50 MG tablet     guaiFENesin-codeine (ROBITUSSIN AC) 100-10 MG/5ML solution     mesalamine (CANASA) 1000 MG Suppository     vitamin B6 (PYRIDOXINE) 250 MG tablet     No current facility-administered medications for this visit.       Social History     Tobacco Use     Smoking status: Never Smoker     Smokeless tobacco: Never Used   Vaping Use     Vaping Use: Never used   Substance Use Topics     Alcohol use: Yes     Alcohol/week: 2.0 standard drinks     Types: 1 Glasses of wine, 1 Shots of liquor per week     Comment: occasional wine or vodka and soda     Drug use: No       Health Maintenance Due   Topic Date Due     ADVANCE CARE PLANNING  Never done     DEPRESSION ACTION PLAN  Never done     Pneumococcal Vaccine: Pediatrics (0 to 5 " Years) and At-Risk Patients (6 to 64 Years) (1 of 2 - PPSV23) Never done     HIV SCREENING  Never done     HEPATITIS C SCREENING  Never done     ZOSTER IMMUNIZATION (1 of 2) Never done     MAMMO SCREENING  06/18/2019     COLORECTAL CANCER SCREENING  12/16/2020     COVID-19 Vaccine (3 - Pfizer risk 3-dose series) 05/17/2021       No results found for: PAP      November 1, 2021 9:17 AM

## 2021-11-01 NOTE — PROGRESS NOTES
SUBJECTIVE:   CC: Doris Fritz is an 51 year old woman who presents for preventive health visit.     Patient has been advised of split billing requirements and indicates understanding: Yes     HPI  # Health Maintenance  - HIV Screening: no concerns  - STI Screening: no concerns  - Hep C Screening: no concerns  - BP:   BP Readings from Last 3 Encounters:   11/01/21 117/79   08/03/21 132/77   12/19/19 115/82   - Cholesterol: lab pending from today  Recent Labs   Lab Test 05/27/20  1001   CHOL 172.0   HDL 64.0   LDL 97.0   TRIG 54.0   The 10-year ASCVD risk score (Yossi LI Jr., et al., 2013) is: 1.1%*    Values used to calculate the score:      Age: 51 years      Sex: Female      Is Non- : No      Diabetic: No      Tobacco smoker: No      Systolic Blood Pressure: 132 mmHg      Is BP treated: No      HDL Cholesterol: 64 mg/dL*      Total Cholesterol: 172 mg/dL*      * - Cholesterol units were assumed for this score calculation  - Diabetes Screening: lab pending from today  - (+) seatbelt use, (+) helmet, (+) smoke detector  - Feels safe at home, denies verbal/physical/emotional abuse in past year: yes  - Last Pap: 2016. Negative cytology, negative for HPV. Doris declines repeat testing today.   - Colonoscopy: Cologuard testing ordered today  - Mammogram: 2018, results not visible. Repeat imaging ordered today  - Diet: fluctuates with stress and mood  - Exercise: frequent walks    PROBLEMS TO ADD ON...  Wart on big toe of RIGHT foot  - been there for awhile now  - has tried OTC medications with no benefit  - would like to have it frozen today    Note:   - patient has a long history of struggling with depression and chronic body aches  - went to a complimentary medical facility in [place] and was diagnosed with chronic Lyme.   - underwent some alternative treatment regimens including induced hyperthermia followed by antibiotic regimen  - wasn't sure the treatments helped her feel any  better but does note she has made some changes to her diet based upon her experience  - since she has come back, she has undergone psilocybin treatment and feels it has really helped  - she also continues to struggle with hot flashes; says her prozac has helped, not interested in HRT at this time.     Today's PHQ-2 Score:   PHQ-2 ( 1999 Pfizer) 11/1/2021   Q1: Little interest or pleasure in doing things 1   Q2: Feeling down, depressed or hopeless 0   PHQ-2 Score 1       Social History     Tobacco Use     Smoking status: Never Smoker     Smokeless tobacco: Never Used   Substance Use Topics     Alcohol use: Yes     Alcohol/week: 2.0 standard drinks     Types: 1 Glasses of wine, 1 Shots of liquor per week     Comment: occasional wine or vodka and soda     If you drink alcohol do you typically have >3 drinks per day or >7 drinks per week? No      Reviewed orders with patient.  Reviewed health maintenance and updated orders accordingly - Yes    Breast Cancer Screening:  Mammogram Screening: Recommended annual mammography  Pertinent mammograms are reviewed under the imaging tab.  Mammogram ordered today    PAP / HPV 1/12/2016   PAP Negative for squamous intraepithelial lesion or malignancy  Electronically signed by Beverley Dowd CT (ASCP) on 1/19/2016 at  9:04 AM     Patient declined PAP with co-testing today. Plans to return for testing at a future date.       Reviewed and updated as needed this visit by clinical staff  Tobacco  Allergies  Meds  Problems  Med Hx  Surg Hx  Fam Hx  Soc Hx          Reviewed and updated as needed this visit by Provider  Tobacco  Allergies  Meds  Problems  Med Hx  Surg Hx  Fam Hx         Past Medical History:   Diagnosis Date     Depressive disorder      Fibromyalgia       Past Surgical History:   Procedure Laterality Date     ABDOMEN SURGERY      laparoscopy check ovaries     AS REDUCTION OF LARGE BREAST       DENTAL SURGERY       DILATION AND CURETTAGE      miscarriage  "    MAMMOPLASTY REDUCTION         Review of Systems  CONSTITUTIONAL: NEGATIVE for fever, chills, change in weight  INTEGUMENTARY/SKIN: NEGATIVE for worrisome rashes, moles or lesions  EYES: NEGATIVE for vision changes or irritation  ENT: NEGATIVE for ear, mouth and throat problems  RESP: NEGATIVE for significant cough or SOB  BREAST: NEGATIVE for masses, tenderness or discharge  CV: NEGATIVE for chest pain, palpitations or peripheral edema  GI: NEGATIVE for nausea, abdominal pain, heartburn, or change in bowel habits  : Hot flashes as noted above. NEGATIVE for unusual urinary or vaginal symptoms. No vaginal bleeding.  MUSCULOSKELETAL: NEGATIVE for significant arthralgias or myalgia  NEURO: NEGATIVE for weakness, dizziness or paresthesias  PSYCHIATRIC: NEGATIVE for changes in mood or affect      OBJECTIVE:   /79 (BP Location: Right arm, Patient Position: Sitting, Cuff Size: Adult Regular)   Pulse 66   Temp (!) 96.7  F (35.9  C) (Skin)   Resp 12   Ht 1.78 m (5' 10.08\")   Wt 71.2 kg (157 lb)   LMP  (LMP Unknown)   SpO2 100%   BMI 22.48 kg/m    Physical Exam  GENERAL APPEARANCE: healthy, alert and no distress  EYES: Eyes grossly normal to inspection, PERRL and conjunctivae and sclerae normal  HENT: ear canals and TM's normal, nose and mouth without ulcers or lesions, oropharynx clear and oral mucous membranes moist  NECK: no adenopathy, no asymmetry, masses, or scars and thyroid normal to palpation  RESP: lungs clear to auscultation - no rales, rhonchi or wheezes  BREAST: normal without masses, tenderness or nipple discharge and no palpable axillary masses or adenopathy  CV: regular rate and rhythm, normal S1 S2, no S3 or S4, no murmur, click or rub, no peripheral edema and peripheral pulses strong  ABDOMEN: soft, nontender, no hepatosplenomegaly, no masses and bowel sounds normal  MS: no musculoskeletal defects are noted and gait is age appropriate without ataxia  SKIN: single wart on bottom of 1st toe " of RIGHT foot  NEURO: Normal strength and tone, sensory exam grossly normal, mentation intact and speech normal  PSYCH: mentation appears normal and affect normal/bright    Diagnostic Test Results:  Labs reviewed in Epic    ASSESSMENT/PLAN:   Doris was seen today for physical, plantar wart and menopausal sx.    Diagnoses and all orders for this visit:    Routine general medical examination at a health care facility  -     Comprehensive metabolic panel; Future  -     TSH with free T4 reflex; Future  -     Comprehensive metabolic panel  -     TSH with free T4 reflex    Visit for screening mammogram  -     MA Screening Digital Bilateral; Future    Screening for colon cancer  -     COLOGUARD(EXACT SCIENCES); Future    Screening for lipid disorders  -     Lipid Profile; Future  -     Lipid Profile    Anxiety and depression  -     DEPRESSION ACTION PLAN (DAP)    Plantar warts    Discussed risks and benefits of freezing wart and patient wishes to proceed with freezing. Superficial skin was pared with #10 scalpel. Liquid nitrogen was applied to the area for 5 seconds, allowed to thaw and then nitrogen was applied again for another 5 seconds. No bleeding. Patient tolerated the procedure without difficulty    Discussion as noted in HPI above. Advised patient I have very limited information with respects to the facility she visited in [place] where she was diagnosed with Lyme. Nor can I speak with any authority to her current therapy with psilocybin. Giver her history with alcohol dependence I did express some concerns for this. Encouraged Doris to continue to communicate with me in terms of her ongoing treatment strategy. With respects to her hot flashes, I advised so long as her prozac appears to be effectively managing symptoms she should continue taking it. I see no reason to transition to HRT at this time.     Patient has been advised of split billing requirements and indicates understanding:  "Yes  COUNSELING:  Reviewed preventive health counseling, as reflected in patient instructions    Estimated body mass index is 23.01 kg/m  as calculated from the following:    Height as of 8/3/21: 1.803 m (5' 11\").    Weight as of 8/3/21: 74.8 kg (165 lb).        She reports that she has never smoked. She has never used smokeless tobacco.      Counseling Resources:  ATP IV Guidelines  Pooled Cohorts Equation Calculator  Breast Cancer Risk Calculator  BRCA-Related Cancer Risk Assessment: FHS-7 Tool  FRAX Risk Assessment  ICSI Preventive Guidelines  Dietary Guidelines for Americans, 2010  USDA's MyPlate  ASA Prophylaxis  Lung CA Screening    Donn Ramirez MD  [facility]  "

## 2021-11-01 NOTE — LETTER
My Depression Action Plan  Name: Doris Fritz   Date of Birth 1970  Date: 11/1/2021    My doctor: Donn Ramirez   My clinic: [address]  [facility] [address]  [phone]          GREEN    ZONE   Good Control    What it looks like:     Things are going generally well. You have normal ups and downs. You may even feel depressed from time to time, but bad moods usually last less than a day.   What you need to do:  1. Continue to care for yourself (see self care plan)  2. Check your depression survival kit and update it as needed  3. Follow your physician s recommendations including any medication.  4. Do not stop taking medication unless you consult with your physician first.           YELLOW         ZONE Getting Worse    What it looks like:     Depression is starting to interfere with your life.     It may be hard to get out of bed; you may be starting to isolate yourself from others.    Symptoms of depression are starting to last most all day and this has happened for several days.     You may have suicidal thoughts but they are not constant.   What you need to do:     1. Call your care team. Your response to treatment will improve if you keep your care team informed of your progress. Yellow periods are signs an adjustment may need to be made.     2. Continue your self-care.  Just get dressed and ready for the day.  Don't give yourself time to talk yourself out of it.    3. Talk to someone in your support network.    4. Open up your Depression Self-Care Plan/Wellness Kit.           RED    ZONE Medical Alert - Get Help    What it looks like:     Depression is seriously interfering with your life.     You may experience these or other symptoms: You can t get out of bed most days, can t work or engage in other necessary activities, you have trouble taking care of basic hygiene, or basic responsibilities, thoughts of suicide or death  that will not go away, self-injurious behavior.     What you need to do:  1. Call your care team and request a same-day appointment. If they are not available (weekends or after hours) call your local crisis line, emergency room or 911.          Depression Self-Care Plan / Wellness Kit    Many people find that medication and therapy are helpful treatments for managing depression. In addition, making small changes to your everyday life can help to boost your mood and improve your wellbeing. Below are some tips for you to consider. Be sure to talk with your medical provider and/or behavioral health consultant if your symptoms are worsening or not improving.     Sleep   Sleep hygiene  means all of the habits that support good, restful sleep. It includes maintaining a consistent bedtime and wake time, using your bedroom only for sleeping or sex, and keeping the bedroom dark and free of distractions like a computer, smartphone, or television.     Develop a Healthy Routine  Maintain good hygiene. Get out of bed in the morning, make your bed, brush your teeth, take a shower, and get dressed. Don t spend too much time viewing media that makes you feel stressed. Find time to relax each day.    Exercise  Get some form of exercise every day. This will help reduce pain and release endorphins, the  feel good  chemicals in your brain. It can be as simple as just going for a walk or doing some gardening, anything that will get you moving.      Diet  Strive to eat healthy foods, including fruits and vegetables. Drink plenty of water. Avoid excessive sugar, caffeine, alcohol, and other mood-altering substances.     Stay Connected with Others  Stay in touch with friends and family members.    Manage Your Mood  Try deep breathing, massage therapy, biofeedback, or meditation. Take part in fun activities when you can. Try to find something to smile about each day.     Psychotherapy  Be open to working with a therapist if your provider  recommends it.     Medication  Be sure to take your medication as prescribed. Most anti-depressants need to be taken every day. It usually takes several weeks for medications to work. Not all medicines work for all people. It is important to follow-up with your provider to make sure you have a treatment plan that is working for you. Do not stop your medication abruptly without first discussing it with your provider.    Crisis Resources   These hotlines are for both adults and children. They and are open 24 hours a day, 7 days a week unless noted otherwise.      National Suicide Prevention Lifeline   5-627-147-TNUT (9638)      Crisis Text Line    www.crisistextline.org  Text HOME to 257539 from anywhere in the [place], anytime, about any type of crisis. A live, trained crisis counselor will receive the text and respond quickly.      [place] Lifeline for LGBTQ Youth  A national crisis intervention and suicide lifeline for LGBTQ youth under 25. Provides a safe place to talk without judgement. Call 1-220.296.3793; text START to 330290 or visit www.thetrevorproject.org to talk to a trained counselor.      For [facility] crisis numbers, visit the [facility] website at:  https://[place].gov/dhs/people-we-serve/adults/health-care/mental-health/resources/crisis-contacts.jsp

## 2021-11-02 ASSESSMENT — ANXIETY QUESTIONNAIRES: GAD7 TOTAL SCORE: 0

## 2021-11-03 NOTE — PROGRESS NOTES
This encounter was created solely for the purpose of releasing the future order that was placed for Cologuard.  This is a necessary step in order for the results to be abstracted once they are available.  Donald Vazquez

## 2022-01-22 ENCOUNTER — HEALTH MAINTENANCE LETTER (OUTPATIENT)
Age: 52
End: 2022-01-22

## 2022-03-08 ENCOUNTER — OFFICE VISIT (OUTPATIENT)
Dept: FAMILY MEDICINE | Facility: CLINIC | Age: 52
End: 2022-03-08
Payer: COMMERCIAL

## 2022-03-08 VITALS
TEMPERATURE: 97.1 F | DIASTOLIC BLOOD PRESSURE: 72 MMHG | SYSTOLIC BLOOD PRESSURE: 112 MMHG | HEART RATE: 78 BPM | OXYGEN SATURATION: 100 % | HEIGHT: 70 IN | WEIGHT: 155 LBS | BODY MASS INDEX: 22.19 KG/M2

## 2022-03-08 DIAGNOSIS — G44.209 TENSION HEADACHE: ICD-10-CM

## 2022-03-08 DIAGNOSIS — M79.621 PAIN OF RIGHT UPPER ARM: ICD-10-CM

## 2022-03-08 DIAGNOSIS — Z23 NEED FOR SHINGLES VACCINE: ICD-10-CM

## 2022-03-08 DIAGNOSIS — M54.2 NECK PAIN: Primary | ICD-10-CM

## 2022-03-08 RX ORDER — CYCLOBENZAPRINE HCL 5 MG
5 TABLET ORAL 3 TIMES DAILY PRN
Qty: 30 TABLET | Refills: 1 | Status: SHIPPED | OUTPATIENT
Start: 2022-03-08 | End: 2022-11-11

## 2022-03-08 RX ORDER — MELOXICAM 15 MG/1
15 TABLET ORAL DAILY
Qty: 21 TABLET | Refills: 0 | Status: SHIPPED | OUTPATIENT
Start: 2022-03-08 | End: 2022-11-11

## 2022-03-08 ASSESSMENT — PAIN SCALES - GENERAL: PAINLEVEL: SEVERE PAIN (7)

## 2022-03-08 NOTE — NURSING NOTE
Prior to immunization administration, verified patients identity using patient s name and date of birth. Please see Immunization Activity for additional information.     Screening Questionnaire for Adult Immunization    Are you sick today?   No   Do you have allergies to medications, food, a vaccine component or latex?   No   Have you ever had a serious reaction after receiving a vaccination?   No   Do you have a long-term health problem with heart, lung, kidney, or metabolic disease (e.g., diabetes), asthma, a blood disorder, no spleen, complement component deficiency, a cochlear implant, or a spinal fluid leak?  Are you on long-term aspirin therapy?   No   Do you have cancer, leukemia, HIV/AIDS, or any other immune system problem?   No   Do you have a parent, brother, or sister with an immune system problem?   No   In the past 3 months, have you taken medications that affect  your immune system, such as prednisone, other steroids, or anticancer drugs; drugs for the treatment of rheumatoid arthritis, Crohn s disease, or psoriasis; or have you had radiation treatments?   No   Have you had a seizure, or a brain or other nervous system problem?   No   During the past year, have you received a transfusion of blood or blood    products, or been given immune (gamma) globulin or antiviral drug?   No   For women: Are you pregnant or is there a chance you could become       pregnant during the next month?   No   Have you received any vaccinations in the past 4 weeks?   No     Immunization questionnaire answers were all negative.        Per orders of Dr. Ramirez, injection of Shingrix given by Ale Tillman MA. Patient instructed to remain in clinic for 15 minutes afterwards, and to report any adverse reaction to me immediately.       Screening performed by Ale Tillman MA on 3/8/2022 at 10:53 AM.

## 2022-03-08 NOTE — PROGRESS NOTES
Assessment & Plan   Problem List Items Addressed This Visit     None      Visit Diagnoses     Neck pain    -  Primary    Relevant Medications    meloxicam (MOBIC) 15 MG tablet    cyclobenzaprine (FLEXERIL) 5 MG tablet    Other Relevant Orders    Physical Therapy Referral    Tension headache        Relevant Medications    meloxicam (MOBIC) 15 MG tablet    cyclobenzaprine (FLEXERIL) 5 MG tablet    Other Relevant Orders    Physical Therapy Referral    Pain of right upper arm        Relevant Medications    meloxicam (MOBIC) 15 MG tablet    cyclobenzaprine (FLEXERIL) 5 MG tablet    Other Relevant Orders    Physical Therapy Referral    Need for shingles vaccine        Relevant Orders    ZOSTER VACCINE RECOMBINANT ADJUVANTED IM NJX (Completed)         Suspect symptoms are primarily muscular in nature. Referred to PT as noted above and daily NSAID and PRN muscle relaxant. FM diagnosis is over 20 years old and I do not have records of whether there was a previous workup for possible autoimmune issues like PMR. Will start with conservative treatment strategies as noted today, but would consider a broad lab assessment for alternative causes for these symptoms if patient fails to see improvement.     We talked about shingles vaccine at Doris's last wellness exam. Will order this today as noted.     33 minutes spent on the date of the encounter doing chart review, history and exam, documentation and further activities as noted above.    Donn Ramirez MD  [facility]    Subjective   Doris is a 52 year old who presents for the following health issues    HPI   Right Arm Pain, Neck Pain and Headache  - long history of diagnosed fibromyalgia, mostly in hips and lower back for at least 25 years  - but this all feels very different   - started with RIGHT shoulder and arm pain about 4 months ago  - has seen a chiropractor and a massage therapist for this and it usually helps  - does occasionally feel tingling down her  "arm, but denies any persistent paresthesia or weakness  - takes occasional ibuprofen which also helps  - more recently, the neck pain started about 2 months ago and developed into headache pain in a tension type pattern  - headaches are now happening every day  - no aura, pulsating, photophobia, nausea  - no fever  - no vision changes  - massage has not really helped with the neck or headache pain  - ongoing pain has caused her to stop working for a period and makes it very difficult to focus at work      Review of Systems   Constitutional, HEENT, cardiovascular, pulmonary, gi and gu systems are negative, except as otherwise noted.      Objective    /72   Pulse 78   Temp 97.1  F (36.2  C)   Ht 1.78 m (5' 10.08\")   Wt 70.3 kg (155 lb)   LMP  (LMP Unknown)   SpO2 100%   BMI 22.19 kg/m    Body mass index is 22.19 kg/m .  Physical Exam   GENERAL: healthy, alert and no distress  EYES: Eyes grossly normal to inspection, PERRL and conjunctivae and sclerae normal  HENT: ear canals and TM's normal, nose and mouth without ulcers or lesions  NECK: no adenopathy, no asymmetry, masses, or scars and thyroid normal to palpation  RESP: lungs clear to auscultation - no rales, rhonchi or wheezes  BREAST: normal without masses, tenderness or nipple discharge and no palpable axillary masses or adenopathy  CV: regular rate and rhythm, normal S1 S2, no S3 or S4, no murmur, click or rub, no peripheral edema and peripheral pulses strong  ABDOMEN: soft, nontender, no hepatosplenomegaly, no masses and bowel sounds normal  MS: head, neck an shoulder  - no gross malformation or skin changes  - palpable muscle tension and warmth at shoulder and neck, minimal tenderness to palpation at upper back, neck and shoulder  - full AROM with neck rotation, flexion, extension and side flexion  - full AROM with RIGHT shoulder internal and external rotation, aB/aDduction  - UE strength and sensation grossly normal  SKIN: no suspicious lesions " or rashes  NEURO: Normal strength and tone, mentation intact and speech normal  PSYCH: mentation appears normal, affect normal/bright

## 2022-03-08 NOTE — NURSING NOTE
"52 year old  Chief Complaint   Patient presents with     Neck Pain     neck pain radiating down to the shoulders. Tingling down right arm as well     Headache     past to months        Blood pressure 112/72, pulse 78, temperature 97.1  F (36.2  C), height 1.78 m (5' 10.08\"), weight 70.3 kg (155 lb), SpO2 100 %, not currently breastfeeding. Body mass index is 22.19 kg/m .  Patient Active Problem List   Diagnosis     Urethral diverticulum     Chronic constipation     Anxiety and depression     Chronic fatigue     Gastrointestinal food sensitivity     Fibromyalgia     Weight gain     Recurrent major depressive disorder (H)     Major depressive disorder, recurrent episode, moderate (H)     Bulimia nervosa     Uncomplicated alcohol dependence (H)     Vitamin D deficiency     Ulcerative proctitis (H)     Ulcerative colitis (H)     Small intestinal bacterial overgrowth     Insomnia     Hypothyroidism     Endometriosis       Wt Readings from Last 2 Encounters:   03/08/22 70.3 kg (155 lb)   11/01/21 71.2 kg (157 lb)     BP Readings from Last 3 Encounters:   03/08/22 112/72   11/01/21 117/79   08/03/21 132/77         Current Outpatient Medications   Medication     FLUoxetine (PROZAC) 20 MG capsule     LORazepam (ATIVAN) 0.5 MG tablet     MAGNESIUM PO     multivitamin, therapeutic with minerals (THERA-VIT-M) TABS tablet     traZODone (DESYREL) 50 MG tablet     No current facility-administered medications for this visit.       Social History     Tobacco Use     Smoking status: Never Smoker     Smokeless tobacco: Never Used   Vaping Use     Vaping Use: Never used   Substance Use Topics     Alcohol use: Yes     Alcohol/week: 2.0 standard drinks     Types: 1 Glasses of wine, 1 Shots of liquor per week     Comment: occasional wine or vodka and soda     Drug use: No       Health Maintenance Due   Topic Date Due     ADVANCE CARE PLANNING  Never done     Pneumococcal Vaccine: Pediatrics (0 to 5 Years) and At-Risk Patients (6 to 64 " Years) (1 of 2 - PPSV23) Never done     HIV SCREENING  Never done     HEPATITIS C SCREENING  Never done     ZOSTER IMMUNIZATION (1 of 2) Never done     MAMMO SCREENING  06/18/2019     COLORECTAL CANCER SCREENING  12/16/2020       No results found for: PAP      March 8, 2022 10:09 AM

## 2022-03-11 ENCOUNTER — THERAPY VISIT (OUTPATIENT)
Dept: PHYSICAL THERAPY | Facility: CLINIC | Age: 52
End: 2022-03-11
Attending: FAMILY MEDICINE
Payer: COMMERCIAL

## 2022-03-11 DIAGNOSIS — M54.2 NECK PAIN: ICD-10-CM

## 2022-03-11 DIAGNOSIS — M54.2 CERVICAL PAIN: ICD-10-CM

## 2022-03-11 DIAGNOSIS — M79.621 PAIN OF RIGHT UPPER ARM: ICD-10-CM

## 2022-03-11 DIAGNOSIS — G44.209 TENSION HEADACHE: ICD-10-CM

## 2022-03-11 PROCEDURE — 97112 NEUROMUSCULAR REEDUCATION: CPT | Mod: GP | Performed by: PHYSICAL THERAPIST

## 2022-03-11 PROCEDURE — 97162 PT EVAL MOD COMPLEX 30 MIN: CPT | Mod: GP | Performed by: PHYSICAL THERAPIST

## 2022-03-11 PROCEDURE — 97110 THERAPEUTIC EXERCISES: CPT | Mod: GP | Performed by: PHYSICAL THERAPIST

## 2022-03-11 NOTE — PROGRESS NOTES
Physical Therapy Initial Evaluation  Subjective:  The history is provided by the patient. No  was used.   Patient Health History  Doris Fritz being seen for neck pain.     Problem began: 11/1/2021.   Problem occurred: I've had neck pain/discomfort for years. November 2021 the issue became more severe and debilitating.   Pain is reported as 6/10 on pain scale.  General health as reported by patient is good.  Pertinent medical history includes: fibromyalgia, migraines/headaches and numbness/tingling.     Medical allergies: none.   Surgeries include:  None.    Current medications:  Anti-depressants, muscle relaxants and pain medication.    Current occupation is self employed.   Primary job tasks include:  Computer work.                  Therapist Generated HPI Evaluation  Problem details: MD 3/8/22 for chronic neck and HA pain that is trending worse past several months since Nov 2021. She states she has had chronic pain since the 1990's. Is seeing massage therapist and chiropractor weekly.         Type of problem:  Cervical spine.    This is a chronic condition.  Condition occurred with:  Insidious onset.  Where condition occurred: for unknown reasons.  Patient reports pain:  Upper thoracic, central cervical spine, cervical left side and cervical right side.  Pain is described as aching and sharp and is constant.  Pain radiates to:  Shoulder right, shoulder left and head. Pain is the same all the time.    Associated symptoms:  Headache.                                Objective:  Standing Alignment:    Cervical/Thoracic:  Forward head  Shoulder/UE:  Rounded shoulders, scapular winging L and scapular winging R                                  Cervical/Thoracic Evaluation    AROM:  AROM Cervical:    Flexion:            <50% most painful  Extension:       NA  Rotation:         Left: 75%     Right: 75%  Side Bend:      Left: NA     Right:  NA      Headaches: cervical  Cervical Myotomes:   normal                  DTR's:  normal          Cervical Dermatomes:  normal                    Cervical Palpation:    Tenderness present at Left:    Scalenes; Rhomboids; Upper Trap; Levator; Erector Spinae; Facet and Suboccipitals  Tenderness present at Right:    Scalenes; Rhomboids; Upper Trap; Levator; Erector Spinae; Facet and Suboccipitals    Cervical Stability/Joint Clearing:      Left negative at: TLA LAT or VAT    Right negative at:  TLA LAT or VAT  Negative:ALAR Ligament and TLA AP    Cord Sign:  normal                                            General     ROS    Assessment/Plan:    Patient is a 52 year old female with cervical complaints.    Patient has the following significant findings with corresponding treatment plan.                Diagnosis 1:  Neck and HA's  Pain -  manual therapy, self management, education, directional preference exercise and home program  Decreased ROM/flexibility - manual therapy and therapeutic exercise  Decreased joint mobility - manual therapy and therapeutic exercise  Decreased strength - therapeutic exercise and therapeutic activities  Impaired muscle performance - neuro re-education  Decreased function - therapeutic activities  Impaired posture - neuro re-education    Therapy Evaluation Codes:   1) History comprised of:   Personal factors that impact the plan of care:      Overall behavior pattern and Past/current experiences.    Comorbidity factors that impact the plan of care are:      Depression and chronic pain.     Medications impacting care: Anti-depressant, Muscle relaxant and Pain.  2) Examination of Body Systems comprised of:   Body structures and functions that impact the plan of care:      Cervical spine and Head.   Activity limitations that impact the plan of care are:      Bathing, Cooking, Driving, Dressing, Lifting, Reading/Computer work, Sitting, Sports, Walking and Sleeping.  3) Clinical presentation characteristics  are:   Evolving/Changing.  4) Decision-Making    Moderate complexity using standardized patient assessment instrument and/or measureable assessment of functional outcome.  Cumulative Therapy Evaluation is: Moderate complexity.    Previous and current functional limitations:  (See Goal Flow Sheet for this information)    Short term and Long term goals: (See Goal Flow Sheet for this information)     Communication ability:  Patient appears to be able to clearly communicate and understand verbal and written communication and follow directions correctly.  Treatment Explanation - The following has been discussed with the patient:   RX ordered/plan of care  Anticipated outcomes  Possible risks and side effects  This patient would benefit from PT intervention to resume normal activities.   Rehab potential is good.    Frequency:  3 X a month, once daily  Duration:  for 3 months  Discharge Plan:  Achieve all LTG.  Independent in home treatment program.  Reach maximal therapeutic benefit.    Please refer to the daily flowsheet for treatment today, total treatment time and time spent performing 1:1 timed codes.

## 2022-03-11 NOTE — PROGRESS NOTES
[facility]    OUTPATIENT Physical Therapy ORTHOPEDIC EVALUATION  PLAN OF TREATMENT FOR OUTPATIENT REHABILITATION  (COMPLETE FOR INITIAL CLAIMS ONLY)  Patient's Last Name, First Name, M.I.  YOB: 1970  Doris Fritz    Provider s Name:  [facility]   Medical Record No.  [id number]   Start of Care Date:  03/11/22   Onset Date:   03/08/22   Type:     _X__PT   ___OT Medical Diagnosis:    Encounter Diagnoses   Name Primary?     Neck pain      Tension headache      Pain of right upper arm      Cervical pain         Treatment Diagnosis:  HA's and neck pain        Goals:     03/11/22 0500   Goal #1   Goal #1 headaches   Previous Functional Level No headaches   Current Functional Level Constant headache   STG Target Performance Reduce headaches to less than constant   Rationale for full and safe concentration;to establish restorative sleep pattern;to improve quality of life and resume normal social activities   Due Date 04/08/22   LTG Target Performance Reduce frequency of headaches in a week to   Performance Level 1x   Rationale for full and safe concentration;to establish restorative sleep pattern;to improve quality of life and resume normal social activities;to reduce missed time at work   Due Date 06/08/22   Goal #2   Goal #2 reading/computer work   Previous Functional Level No restrictions   Current Functional Level Minutes patient can read;Minutes patient can work on a computer   Performance level <5 min   STG Target Performance Minutes patient will be able to read;Minutes patient will be able to work on a computer   Performance level 10-15   Rationale to perform work related job duties;to perform home related tasks ;to return to normal leisure activities   Due date 04/08/22   LTG Target Performance Minutes patient will be able to read;Minutes patient will be able to  work on a computer   Performance Level 30'   Rationale to perform work related job duties;to perform home related tasks;to return to normal leisure activities   Due date 06/08/22       Therapy Frequency:  3x month  Predicted Duration of Therapy Intervention:  3 months    Maximus Garcia, PT                 I CERTIFY THE NEED FOR THESE SERVICES FURNISHED UNDER        THIS PLAN OF TREATMENT AND WHILE UNDER MY CARE     (Physician attestation of this document indicates review and certification of the therapy plan).                       Certification Date From:  03/11/22   Certification Date To:  06/08/22    Referring Provider:  Donn Ramirez    Initial Assessment        See Epic Evaluation SOC Date: 03/11/22

## 2022-03-18 ENCOUNTER — THERAPY VISIT (OUTPATIENT)
Dept: PHYSICAL THERAPY | Facility: CLINIC | Age: 52
End: 2022-03-18
Attending: FAMILY MEDICINE
Payer: COMMERCIAL

## 2022-03-18 DIAGNOSIS — M54.2 CERVICAL PAIN: ICD-10-CM

## 2022-03-18 DIAGNOSIS — G44.209 TENSION HEADACHE: ICD-10-CM

## 2022-03-18 PROCEDURE — 97110 THERAPEUTIC EXERCISES: CPT | Mod: GP | Performed by: PHYSICAL THERAPIST

## 2022-03-18 PROCEDURE — 97140 MANUAL THERAPY 1/> REGIONS: CPT | Mod: GP | Performed by: PHYSICAL THERAPIST

## 2022-03-18 PROCEDURE — 97112 NEUROMUSCULAR REEDUCATION: CPT | Mod: GP | Performed by: PHYSICAL THERAPIST

## 2022-03-30 ENCOUNTER — THERAPY VISIT (OUTPATIENT)
Dept: PHYSICAL THERAPY | Facility: CLINIC | Age: 52
End: 2022-03-30
Payer: COMMERCIAL

## 2022-03-30 DIAGNOSIS — G44.209 TENSION HEADACHE: ICD-10-CM

## 2022-03-30 DIAGNOSIS — M54.2 CERVICAL PAIN: ICD-10-CM

## 2022-03-30 PROCEDURE — 97140 MANUAL THERAPY 1/> REGIONS: CPT | Mod: GP | Performed by: PHYSICAL THERAPIST

## 2022-03-30 PROCEDURE — 97110 THERAPEUTIC EXERCISES: CPT | Mod: GP | Performed by: PHYSICAL THERAPIST

## 2022-03-30 PROCEDURE — 97112 NEUROMUSCULAR REEDUCATION: CPT | Mod: GP | Performed by: PHYSICAL THERAPIST

## 2022-04-06 ENCOUNTER — THERAPY VISIT (OUTPATIENT)
Dept: PHYSICAL THERAPY | Facility: CLINIC | Age: 52
End: 2022-04-06
Payer: COMMERCIAL

## 2022-04-06 DIAGNOSIS — G44.209 TENSION HEADACHE: ICD-10-CM

## 2022-04-06 DIAGNOSIS — M54.2 CERVICAL PAIN: ICD-10-CM

## 2022-04-06 PROCEDURE — 97012 MECHANICAL TRACTION THERAPY: CPT | Mod: GP | Performed by: PHYSICAL THERAPIST

## 2022-04-06 PROCEDURE — 97110 THERAPEUTIC EXERCISES: CPT | Mod: GP | Performed by: PHYSICAL THERAPIST

## 2022-04-06 PROCEDURE — 97140 MANUAL THERAPY 1/> REGIONS: CPT | Mod: GP | Performed by: PHYSICAL THERAPIST

## 2022-04-13 ENCOUNTER — THERAPY VISIT (OUTPATIENT)
Dept: PHYSICAL THERAPY | Facility: CLINIC | Age: 52
End: 2022-04-13
Payer: COMMERCIAL

## 2022-04-13 DIAGNOSIS — M54.2 CERVICAL PAIN: ICD-10-CM

## 2022-04-13 DIAGNOSIS — G44.209 TENSION HEADACHE: Primary | ICD-10-CM

## 2022-04-13 PROCEDURE — 97012 MECHANICAL TRACTION THERAPY: CPT | Mod: GP | Performed by: PHYSICAL THERAPIST

## 2022-04-13 PROCEDURE — 97140 MANUAL THERAPY 1/> REGIONS: CPT | Mod: GP | Performed by: PHYSICAL THERAPIST

## 2022-04-13 PROCEDURE — 97110 THERAPEUTIC EXERCISES: CPT | Mod: GP | Performed by: PHYSICAL THERAPIST

## 2022-04-20 ENCOUNTER — THERAPY VISIT (OUTPATIENT)
Dept: PHYSICAL THERAPY | Facility: CLINIC | Age: 52
End: 2022-04-20
Payer: COMMERCIAL

## 2022-04-20 DIAGNOSIS — M54.2 CERVICAL PAIN: ICD-10-CM

## 2022-04-20 DIAGNOSIS — G44.209 TENSION HEADACHE: Primary | ICD-10-CM

## 2022-04-20 PROCEDURE — 97110 THERAPEUTIC EXERCISES: CPT | Mod: GP | Performed by: PHYSICAL THERAPIST

## 2022-04-20 PROCEDURE — 97012 MECHANICAL TRACTION THERAPY: CPT | Mod: GP | Performed by: PHYSICAL THERAPIST

## 2022-04-20 PROCEDURE — 97140 MANUAL THERAPY 1/> REGIONS: CPT | Mod: GP | Performed by: PHYSICAL THERAPIST

## 2022-06-01 NOTE — PROGRESS NOTES
DISCHARGE REPORT    Progress reporting period is from 3/11/22 to 4/22.       SUBJECTIVE  Subjective changes noted by patient:  .  Subjective: Neck is better 40%. Tx helpful at home and in clinic    Current pain level is NA  .     Previous pain level was  NA  .   Changes in function:  Yes (See Goal flowsheet attached for changes in current functional level)  Adverse reaction to treatment or activity: None    OBJECTIVE  Changes noted in objective findings:  Yes, trend is good, and HEP with home traction is helpful        ASSESSMENT/PLAN  Updated problem list and treatment plan: Diagnosis 1:  Neck pain and some LBP  Pain -  mechanical traction, self management, education, directional preference exercise and home program  Decreased ROM/flexibility - manual therapy and therapeutic exercise  Decreased joint mobility - manual therapy and therapeutic exercise  Decreased strength - therapeutic exercise and therapeutic activities  Impaired muscle performance - neuro re-education  Decreased function - therapeutic activities  Impaired posture - neuro re-education  STG/LTGs have been met or progress has been made towards goals:  Yes (See Goal flow sheet completed today.)  Assessment of Progress: The patient's condition has potential to improve.  Self Management Plans:  Patient has been instructed in a home treatment program.    Doris continues to require the following intervention to meet STG and LTG's:  PT intervention is no longer required to meet STG/LTG.    Recommendations:  This patient is ready to be discharged from therapy and continue their home treatment program.    Please refer to the daily flowsheet for treatment today, total treatment time and time spent performing 1:1 timed codes.

## 2022-08-01 NOTE — PROGRESS NOTES
"SUBJECTIVE: Doris Fritz is a 47 y.o. female with:  Chief Complaint   Patient presents with     Fatigue    She has been feeling more fatigued in the last few months.  No obvious precipitating factor.  She was seen 1 month ago in the clinic and had evaluation with normal labs.  She is having a hard time functioning.  She did [employer] testing and uploaded data data to [employer]- She has several SNPs and brings in info.  She is also followed by a naturopath who has been working with her. She is on several supplements.  She has a bowel movement every 3 days unless she takes something.  She will be starting pelvic floor biofeedback and she will get Mayan abdominal therapy.  She is worried about her kidney function.  She had [facility] heavy metal Red Blood cell test that did not show any problems with heavy metal overload.  No new stressors.  Her hot flashes are better after using topical yam progesterone cream.    OBJECTIVE: /52 (Patient Site: Right Arm, Patient Position: Sitting, Cuff Size: Adult Regular)  Ht 5' 10.5\" (1.791 m)  Wt 160 lb (72.6 kg)  BMI 22.63 kg/m2 no distress    Lab Results   Component Value Date    FERRITIN 24 01/15/2018     Lab Results   Component Value Date    TSH 2.30 01/15/2018       Doris was seen today for fatigue.    Diagnoses and all orders for this visit:    Chronic fatigue syndrome  Her iron stores are sub-optimal so I recommend an iron supplement to get her ferritin to 50 or above.  She will discuss with her naturopath.  We had a long discussion about genetic SNPs and potential to add to chronic disease- I advised her at this time I did not have a lot of experience interpreting these tests.  Referred to Dr. Johnny Grigsby at [facility].    Subclinical hypothyroidism - Her TSH has increased and she wants to try thyroid replacement.  Follow-up in 6 weeks for recheck.  -     thyroid, pork, (ARMOUR THYROID) 15 mg Tab; Take one po in am    25 " Left message with [facility] department regarding some guidance on ordering scan  Direct teams number provided  minutes spent with the patient.  Over 50% were spent in counseling and coordination of care.       Kathleen Salazar

## 2022-09-19 ENCOUNTER — VIRTUAL VISIT (OUTPATIENT)
Dept: FAMILY MEDICINE | Facility: CLINIC | Age: 52
End: 2022-09-19
Payer: COMMERCIAL

## 2022-09-19 ENCOUNTER — NURSE TRIAGE (OUTPATIENT)
Dept: FAMILY MEDICINE | Facility: CLINIC | Age: 52
End: 2022-09-19

## 2022-09-19 DIAGNOSIS — U07.1 INFECTION DUE TO 2019 NOVEL CORONAVIRUS: ICD-10-CM

## 2022-09-19 DIAGNOSIS — U07.1 CLINICAL DIAGNOSIS OF COVID-19: ICD-10-CM

## 2022-09-19 RX ORDER — TRAZODONE HYDROCHLORIDE 50 MG/1
50 TABLET, FILM COATED ORAL AT BEDTIME
COMMUNITY

## 2022-09-19 ASSESSMENT — PATIENT HEALTH QUESTIONNAIRE - PHQ9: SUM OF ALL RESPONSES TO PHQ QUESTIONS 1-9: 3

## 2022-09-19 NOTE — TELEPHONE ENCOUNTER
COVID Medication Reconciliation  ~Lorazepam - Hold x 8 days    Ph[facility]:  [facility]    Reason for Disposition    [1] COVID-19 diagnosed by positive lab test (e.g., PCR, rapid self-test kit) AND [2] mild symptoms (e.g., cough, fever, others) AND [3] no complications or SOB    Answer Assessment - Initial Assessment Questions  1. COVID-19 DIAGNOSIS: Positive home COVID antigen test 9/19/22  2. COVID-19 EXPOSURE:  positive after recent travel.  3. ONSET: Friday, 9/17/22  4. WORST SYMPTOM: Headache  5. COUGH: Intermittent dry cough  6. FEVER: No  7. RESPIRATORY STATUS: Normal breathing  8. BETTER-SAME-WORSE: Worse - headache and throat pain  9. HIGH RISK DISEASE: Depression and anxiety  10. VACCINE: Pfizer 3/22/21, 4/19/21  11. BOOSTER: Pfizer #1- 11/8/21, #2 - 8/15/22  12. PREGNANCY: No  13. OTHER SYMPTOMS: Sinus pressure, post nasal drip, fatigue, body aches and sore throat  14. O2 SATURATION MONITOR:  No    Protocols used: CORONAVIRUS (COVID-19) DIAGNOSED OR [id number]    Doris scheduled to see Dr. Ramirez today, 9/19/22 @ 4:00 pm for a virtual COVID treatment visit.  JOSELIOT Henry, RN, [facility]  09/19/22  9:56 AM

## 2022-09-19 NOTE — TELEPHONE ENCOUNTER
Who is calling? Patient  Reason for Call: Covid positive today - requesting treatment

## 2022-09-19 NOTE — PROGRESS NOTES
Doris is a 52 year old who is being evaluated via a billable video visit.      How would you like to obtain your AVS? [facility]        Assessment & Plan   Problem List Items Addressed This Visit    None     Visit Diagnoses     Infection due to 2019 novel coronavirus        Relevant Medications    nirmatrelvir and ritonavir (PAXLOVID) therapy pack    Clinical diagnosis of COVID-19        Relevant Medications    nirmatrelvir and ritonavir (PAXLOVID) therapy pack           COVID Medication Reconciliation  ~Lorazepam - Hold x 8 days     [facility]    21 minutes spent on the date of the encounter doing chart review, history and exam, documentation and further activities as noted.    Donn Ramirez MD  [facility]      Subjective   Doris is a 52 year old presenting for the following health issues:  Covid Treatment (Tested positive 9/19/22.  tested positive 3 days ago. )      HPI     COVID-19 Symptom Review  How many days ago did these symptoms start? 3 (9/16/22)    Are any of the following symptoms significant for you?    New or worsening difficulty breathing? No    Worsening cough? Yes, it's a dry cough.     Fever or chills? No    Headache: YES    Sore throat: YES    Chest pain: No    Diarrhea: No    Body aches? YES    What treatments has patient tried? Acetaminophen, Nonsteroidals, Decongestant - nasal and Cough syrup   Does patient live in a nursing home, group home, or shelter? No  Does patient have a way to get food/medications during quarantined? Yes, I have a friend or family member who can help me.      Review of Systems   Constitutional, HEENT, cardiovascular, pulmonary, gi and gu systems are negative, except as otherwise noted.      Objective           Vitals:  No vitals were obtained today due to virtual visit.    Physical Exam   GENERAL: Healthy, alert and no distress  EYES: Eyes grossly normal to inspection.  No discharge or erythema, or obvious scleral/conjunctival  abnormalities.  RESP: No audible wheeze, cough, or visible cyanosis.  No visible retractions or increased work of breathing.    SKIN: Visible skin clear. No significant rash, abnormal pigmentation or lesions.  NEURO: Cranial nerves grossly intact.  Mentation and speech appropriate for age.  PSYCH: Mentation appears normal, affect normal/bright, judgement and insight intact, normal speech and appearance well-groomed.            Video-Visit Details    Video Start Time: 4:00 PM    Type of service:  Video Visit    Video End Time:4:09 PM    Originating Location (pt. Location): Home    Distant Location (provider location):  [facility]     Platform used for Video Visit: [employer]

## 2022-09-27 ENCOUNTER — NURSE TRIAGE (OUTPATIENT)
Dept: FAMILY MEDICINE | Facility: CLINIC | Age: 52
End: 2022-09-27

## 2022-09-27 NOTE — TELEPHONE ENCOUNTER
"    Reason for Disposition    [1] Red or very tender (to touch) area AND [2] started over 24 hours after the bite    Additional Information    Negative: Red ring or bull's-eye rash occurs at tick bite    Answer Assessment - Initial Assessment Questions  1. TYPE of TICK: \"Is it a wood tick or a deer tick?\" (e.g., deer tick, wood tick; unsure)      unsure  2. SIZE of TICK: \"How big is the tick?\" (e.g., size of poppy seed, apple seed, watermelon seed; unsure) Note: Deer ticks can be the size of a poppy seed (nymph) or an apple seed (adult).        Bigger than poppy but smaller than apple  3. ENGORGED: \"Did the tick look flat or engorged (full, swollen)?\" (e.g., flat, engorged; unsure)      flat  4. LOCATION: \"Where is the tick bite located?\"       Left rib area, below breast  5. ONSET: \"How long do you think the tick was attached before you removed it?\" (e.g., 5 hours, 2 days)       Hours possibly. Woke up with it.  6. APPEARANCE of BITE or RASH: \"What does the site look like?\"      Red spot, 2 quarters next to eachother, slightly raised  7. PREGNANCY: \"Is there any chance you are pregnant?\" \"When was your last menstrual period?\"      no    Protocols used: TICK BITE-A-    Pt wanting antibiotic as she has a history of Lyme Disease. Scheduled with Dr. Ramirez at 11am tomorrow.    JOSELITO Steen, RN  09/27/22, 12:48 PM    "

## 2022-11-11 ENCOUNTER — OFFICE VISIT (OUTPATIENT)
Dept: FAMILY MEDICINE | Facility: CLINIC | Age: 52
End: 2022-11-11
Payer: COMMERCIAL

## 2022-11-11 VITALS
SYSTOLIC BLOOD PRESSURE: 116 MMHG | HEIGHT: 70 IN | BODY MASS INDEX: 22.19 KG/M2 | HEART RATE: 80 BPM | TEMPERATURE: 97.9 F | DIASTOLIC BLOOD PRESSURE: 75 MMHG | OXYGEN SATURATION: 100 % | WEIGHT: 155 LBS

## 2022-11-11 DIAGNOSIS — R30.0 DYSURIA: Primary | ICD-10-CM

## 2022-11-11 LAB
ALBUMIN UR-MCNC: NEGATIVE MG/DL
APPEARANCE UR: CLEAR
BILIRUB UR QL STRIP: NEGATIVE
COLOR UR AUTO: YELLOW
GLUCOSE UR STRIP-MCNC: NEGATIVE MG/DL
HGB UR QL STRIP: NEGATIVE
KETONES UR STRIP-MCNC: NEGATIVE MG/DL
LEUKOCYTE ESTERASE UR QL STRIP: NEGATIVE
NITRATE UR QL: NEGATIVE
PH UR STRIP: 5.5 [PH] (ref 5–7)
SP GR UR STRIP: 1.01 (ref 1–1.03)
UROBILINOGEN UR STRIP-ACNC: 0.2 E.U./DL

## 2022-11-11 PROCEDURE — 87086 URINE CULTURE/COLONY COUNT: CPT

## 2022-11-11 RX ORDER — CIPROFLOXACIN 500 MG/1
500 TABLET, FILM COATED ORAL 2 TIMES DAILY
Qty: 14 TABLET | Refills: 0 | Status: SHIPPED | OUTPATIENT
Start: 2022-11-11 | End: 2023-07-03

## 2022-11-11 NOTE — PROGRESS NOTES
"Assessment & Plan   Doris was seen today for uti.    Diagnoses and all orders for this visit:    Dysuria  -     UA without Microscopic; Future  -     Urine Culture Aerobic Bacterial; Future  -     ciprofloxacin (CIPRO) 500 MG tablet; Take 1 tablet (500 mg) by mouth 2 times daily   UA today unremarkable. Considered other possible diagnoses including STI (pt is low risk), yeast (pt has no change in vaginal discharge or dryness and symptoms are primarily urinary) or interstitial cystitis. Symptoms are consistent with UTI. Urine sent for culture. Due to discomfort will change antibiotic over the weekend and wait for culture results.    Discussed return precautions.       Jessica Dietz, YEHUDA   ______________________________________    Subjective   Doris is a 52 year old patient here today for UTI (Started Saturday, medication hasn't helped //medication name: Nitrofurantonin mono  )    Hx of UTIs, but has not had one in a couple years  Virtual visit on Sunday, prescribed macrobid for 5 days. Has one pill left. No relief  Urinary frequency, dysuria, hesitancy  No fever, chills, NVD  No back pain or abd pain  No new sexual partners  No change in vaginal discharge or odor  Feeling panicked about her symptoms, does not know how she will make it through the weekend  Unable to sleep, constantly getting up to pee. Dysuria.  Feels exactly like past UTIs    Do you need any refills on your Medications today? No      Medical, surgical, family and social histories reviewed and updated as indicated.   Medications and allergies reviewed and updated as indicated.       ROS  Review Of Systems  See HPI    General Physical Exam:  Vitals: /75   Pulse 80   Temp 97.9  F (36.6  C) (Oral)   Ht 1.778 m (5' 10\")   Wt 70.3 kg (155 lb)   LMP  (LMP Unknown)   SpO2 100%   BMI 22.24 kg/m     Physical Exam  Vitals and nursing note reviewed.   Constitutional:       General: She is not in acute distress.     Appearance: Normal " appearance. She is not ill-appearing or diaphoretic.   HENT:      Head: Normocephalic and atraumatic.   Eyes:      Pupils: Pupils are equal, round, and reactive to light.   Cardiovascular:      Rate and Rhythm: Normal rate and regular rhythm.      Heart sounds: Normal heart sounds.   Pulmonary:      Effort: Pulmonary effort is normal.      Breath sounds: Normal breath sounds.   Abdominal:      General: Abdomen is flat. Bowel sounds are normal. There is no distension.      Palpations: Abdomen is soft.      Tenderness: There is no abdominal tenderness. There is no right CVA tenderness or left CVA tenderness.   Skin:     General: Skin is warm and dry.   Neurological:      General: No focal deficit present.      Mental Status: She is alert and oriented to person, place, and time.      Gait: Gait is intact.   Psychiatric:         Mood and Affect: Mood normal.         Behavior: Behavior normal.         Thought Content: Thought content normal.         Judgment: Judgment normal.

## 2022-11-11 NOTE — NURSING NOTE
"ROOM:2  MARIO ALBERTO MEIER    Preferred Name: Doris     DAVID AGREED:            DAVID DECLINED:          Flu    52 year old  Chief Complaint   Patient presents with     UTI     Started Saturday, medication hasn't helped     medication name: Nitrofurantonin mono         Blood pressure 116/75, pulse 80, temperature 97.9  F (36.6  C), temperature source Oral, height 1.778 m (5' 10\"), weight 70.3 kg (155 lb), SpO2 100 %, not currently breastfeeding. Body mass index is 22.24 kg/m .  BP completed using cuff size:        Patient Active Problem List   Diagnosis     Urethral diverticulum     Chronic constipation     Anxiety and depression     Chronic fatigue     Gastrointestinal food sensitivity     Fibromyalgia     Weight gain     Recurrent major depressive disorder (H)     Major depressive disorder, recurrent episode, moderate (H)     Bulimia nervosa     Uncomplicated alcohol dependence (H)     Vitamin D deficiency     Ulcerative proctitis (H)     Ulcerative colitis (H)     Small intestinal bacterial overgrowth     Insomnia     Hypothyroidism     Endometriosis     Tension headache     Cervical pain       Wt Readings from Last 2 Encounters:   11/11/22 70.3 kg (155 lb)   03/08/22 70.3 kg (155 lb)     BP Readings from Last 3 Encounters:   11/11/22 116/75   03/08/22 112/72   11/01/21 117/79       Allergies   Allergen Reactions     Tramadol Other (See Comments)     serotonin syndrome       Current Outpatient Medications   Medication     MAGNESIUM PO     multivitamin, therapeutic with minerals (THERA-VIT-M) TABS tablet     traZODone (DESYREL) 50 MG tablet     cyclobenzaprine (FLEXERIL) 5 MG tablet     FLUoxetine (PROZAC) 20 MG capsule     LORazepam (ATIVAN) 0.5 MG tablet     meloxicam (MOBIC) 15 MG tablet     traZODone (DESYREL) 50 MG tablet     No current facility-administered medications for this visit.       Social History     Tobacco Use     Smoking status: Never     Smokeless tobacco: Never   Vaping Use     Vaping Use: Never " used   Substance Use Topics     Alcohol use: Yes     Alcohol/week: 2.0 standard drinks     Types: 1 Glasses of wine, 1 Shots of liquor per week     Comment: occasional wine or vodka and soda     Drug use: No       Honoring Choices - Health Care Directive Guide offered to patient at time of visit.    Health Maintenance Due   Topic Date Due     ADVANCE CARE PLANNING  Never done     HEPATITIS B IMMUNIZATION (1 of 3 - 3-dose series) Never done     Pneumococcal Vaccine: Pediatrics (0 to 5 Years) and At-Risk Patients (6 to 64 Years) (1 - PCV) Never done     HIV SCREENING  Never done     HEPATITIS C SCREENING  Never done     MAMMO SCREENING  06/18/2019     COLORECTAL CANCER SCREENING  12/16/2020     HPV TEST  01/12/2021     PAP  01/12/2021     INFLUENZA VACCINE (1) 09/01/2022     COVID-19 Vaccine (5 - Booster for Pfizer series) 10/10/2022     MEDICARE ANNUAL WELLNESS VISIT  11/01/2022       Immunization History   Administered Date(s) Administered     COVID-19,PF,Pfizer (12+ Yrs) 03/22/2021, 04/19/2021, 11/08/2021     COVID-19,PF,Pfizer 12+ Yrs (2022 and After) 08/15/2022     Influenza (IIV3) PF 11/07/2003, 11/07/2005     TDAP Vaccine (Adacel) 06/21/2004, 07/01/2008, 07/31/2008     TDAP Vaccine (Boostrix) 02/06/2019     Td (Adult), Adsorbed 06/21/2004     Tdap (Adult) Unspecified Formulation 07/31/2008     Zoster vaccine recombinant adjuvanted (SHINGRIX) 03/08/2022, 06/27/2022       No results found for: PAP    Recent Labs   Lab Test 11/01/21  0955 05/27/20  1008 05/27/20  1001 12/19/19  1104 11/07/19  0905 08/05/19  1327 08/05/19  1322 08/03/19  0856 08/24/18  1201 07/17/18  1021 06/19/18  1049 01/15/18  1121   A1C  --   --   --   --   --   --   --  5.1 4.7  --   --   --    *  --  97.0  --   --  81  --   --   --   --   --   --    HDL 57  --  64.0  --   --  85  --   --   --   --   --   --    TRIG 52  --  54.0  --   --  132  --   --   --   --   --   --    ALT 16  --   --  19  --   --   --   --   --   --  27 16   CR  0.84  --   --   --   --   --  0.7  --   --   --  0.81 0.75   GFRESTIMATED 80  --   --   --   --   --   --   --   --   --  75 >60   GFRESTBLACK  --   --   --   --   --   --   --   --   --   --  >90 >60   ALBUMIN 4.0  --   --  3.9  --   --   --   --   --   --  3.9 3.6   POTASSIUM 4.8  --   --   --   --   --  3.6  --   --   --  4.2 4.4   TSH 0.95 1.32  --   --    < > 0.28*  --   --   --    < >  --  2.30    < > = values in this interval not displayed.       PHQ-2 ( 1999 Pfizer) 9/19/2022 11/1/2021   Q1: Little interest or pleasure in doing things 0 1   Q2: Feeling down, depressed or hopeless 0 0   PHQ-2 Score 0 1   PHQ-2 Total Score (12-17 Years)- Positive if 3 or more points; Administer PHQ-A if positive - 1       PHQ-9 SCORE 1/14/2020 1/28/2020 11/1/2021 9/19/2022   PHQ-9 Total Score 10 15 4 3       TJ-7 SCORE 1/14/2020 1/28/2020 11/1/2021   Total Score 11 13 0       No flowsheet data found.    Kannan Hua    November 11, 2022 1:15 PM

## 2022-11-13 LAB — BACTERIA UR CULT: NORMAL

## 2023-01-14 ENCOUNTER — HEALTH MAINTENANCE LETTER (OUTPATIENT)
Age: 53
End: 2023-01-14

## 2023-03-27 ENCOUNTER — TELEPHONE (OUTPATIENT)
Dept: FAMILY MEDICINE | Facility: CLINIC | Age: 53
End: 2023-03-27

## 2023-04-23 ENCOUNTER — HEALTH MAINTENANCE LETTER (OUTPATIENT)
Age: 53
End: 2023-04-23

## 2023-06-26 ENCOUNTER — TRANSFERRED RECORDS (OUTPATIENT)
Dept: HEALTH INFORMATION MANAGEMENT | Facility: CLINIC | Age: 53
End: 2023-06-26

## 2023-06-30 NOTE — PROGRESS NOTES
Assessment & Plan   Problem List Items Addressed This Visit    None  Visit Diagnoses     Fatigue, unspecified type    -  Primary    Relevant Orders    CBC with platelets (Completed)    Comprehensive metabolic panel    Erythrocyte sedimentation rate auto (Completed)    CRP inflammation    Vitamin D Deficiency    Vitamin B12    Anti Nuclear Deedee IgG by IFA with Reflex    Rheumatoid factor    Cyclic Citrullinated Peptide Antibody IgG    Multiple joint pain        Relevant Orders    CBC with platelets (Completed)    Comprehensive metabolic panel    Erythrocyte sedimentation rate auto (Completed)    CRP inflammation    Vitamin D Deficiency    Vitamin B12    Anti Nuclear Deedee IgG by IFA with Reflex    Rheumatoid factor    Cyclic Citrullinated Peptide Antibody IgG         Unclear cause for ongoing symptoms. Will check labs as noted above and consider follow up as indicated.    24 minutes spent on the date of the encounter doing chart review, history and exam, documentation and further activities as noted.    MD CECILLE Palacios [facility]    Subjective   Doris is a 53 year old, presenting for the following health issues:  Fatigue (Ongoing for a year, Pt reports fatigue and despite lifestyle changes does not see improvement. They don't recover from exercise well and note muscle weakness. 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  - acknowledges some anxiety and depression, but thinks these are due to her fatigue rather than the  fatigue being caused by the depression        11/1/2021     9:13 AM 9/19/2022     3:50 PM 7/3/2023     8:48 AM   PHQ   PHQ-9 Total Score 4 3 10   Q9: Thoughts of better off dead/self-harm past 2 weeks Not at all Not at all Not at all         1/28/2020     3:56 PM 11/1/2021     9:13 AM 7/3/2023     8:49 AM   TJ-7 SCORE   Total Score   8 (mild anxiety)   Total Score 13 0 8         Review of Systems   Constitutional, HEENT, cardiovascular, pulmonary, gi and gu systems are negative, except as otherwise noted.      Objective    /86 (BP Location: Right arm, Patient Position: Sitting, Cuff Size: Adult Regular)   Pulse 67   Temp 97.6  F (36.4  C) (Skin)   Resp 15   LMP  (LMP Unknown)   SpO2 98%   There is no height or weight on file to calculate BMI.  Physical Exam   GENERAL: healthy, alert and no distress  NECK: no adenopathy, no asymmetry, masses, or scars and thyroid normal to palpation  RESP: lungs clear to auscultation - no rales, rhonchi or wheezes  CV: regular rate and rhythm, normal S1 S2, no S3 or S4, no murmur, click or rub, no peripheral edema and peripheral pulses strong  ABDOMEN: soft, nontender, no hepatosplenomegaly, no masses and bowel sounds normal  MS: no gross musculoskeletal defects noted, no edema

## 2023-07-03 ENCOUNTER — OFFICE VISIT (OUTPATIENT)
Dept: FAMILY MEDICINE | Facility: CLINIC | Age: 53
End: 2023-07-03
Payer: COMMERCIAL

## 2023-07-03 VITALS
DIASTOLIC BLOOD PRESSURE: 86 MMHG | SYSTOLIC BLOOD PRESSURE: 119 MMHG | OXYGEN SATURATION: 98 % | RESPIRATION RATE: 15 BRPM | TEMPERATURE: 97.6 F | HEART RATE: 67 BPM

## 2023-07-03 DIAGNOSIS — R53.83 FATIGUE, UNSPECIFIED TYPE: Primary | ICD-10-CM

## 2023-07-03 DIAGNOSIS — M25.50 MULTIPLE JOINT PAIN: ICD-10-CM

## 2023-07-03 LAB
ALBUMIN SERPL BCG-MCNC: 4.5 G/DL (ref 3.5–5.2)
ALP SERPL-CCNC: 92 U/L (ref 35–104)
ALT SERPL W P-5'-P-CCNC: 14 U/L (ref 0–50)
ANION GAP SERPL CALCULATED.3IONS-SCNC: 10 MMOL/L (ref 7–15)
AST SERPL W P-5'-P-CCNC: 16 U/L (ref 0–45)
BILIRUB SERPL-MCNC: 0.2 MG/DL
BUN SERPL-MCNC: 8.3 MG/DL (ref 6–20)
CALCIUM SERPL-MCNC: 9.4 MG/DL (ref 8.6–10)
CHLORIDE SERPL-SCNC: 107 MMOL/L (ref 98–107)
CREAT SERPL-MCNC: 0.83 MG/DL (ref 0.51–0.95)
CRP SERPL-MCNC: <3 MG/L
DEPRECATED CALCIDIOL+CALCIFEROL SERPL-MC: 47 UG/L (ref 20–75)
DEPRECATED HCO3 PLAS-SCNC: 25 MMOL/L (ref 22–29)
ERYTHROCYTE [DISTWIDTH] IN BLOOD BY AUTOMATED COUNT: 12.8 % (ref 10–15)
ERYTHROCYTE [SEDIMENTATION RATE] IN BLOOD BY WESTERGREN METHOD: 5 MM/HR (ref 0–30)
GFR SERPL CREATININE-BSD FRML MDRD: 83 ML/MIN/1.73M2
GLUCOSE SERPL-MCNC: 110 MG/DL (ref 70–99)
HCT VFR BLD AUTO: 43.7 % (ref 35–47)
HGB BLD-MCNC: 14 G/DL (ref 11.7–15.7)
MCH RBC QN AUTO: 29.6 PG (ref 26.5–33)
MCHC RBC AUTO-ENTMCNC: 32 G/DL (ref 31.5–36.5)
MCV RBC AUTO: 92 FL (ref 78–100)
PLATELET # BLD AUTO: 254 10E3/UL (ref 150–450)
POTASSIUM SERPL-SCNC: 5 MMOL/L (ref 3.4–5.3)
PROT SERPL-MCNC: 6.4 G/DL (ref 6.4–8.3)
RBC # BLD AUTO: 4.73 10E6/UL (ref 3.8–5.2)
SODIUM SERPL-SCNC: 142 MMOL/L (ref 136–145)
VIT B12 SERPL-MCNC: 1578 PG/ML (ref 232–1245)
WBC # BLD AUTO: 4.5 10E3/UL (ref 4–11)

## 2023-07-03 PROCEDURE — 82607 VITAMIN B-12: CPT | Performed by: FAMILY MEDICINE

## 2023-07-03 PROCEDURE — 86038 ANTINUCLEAR ANTIBODIES: CPT | Performed by: FAMILY MEDICINE

## 2023-07-03 PROCEDURE — 86200 CCP ANTIBODY: CPT | Performed by: FAMILY MEDICINE

## 2023-07-03 PROCEDURE — 86140 C-REACTIVE PROTEIN: CPT | Performed by: FAMILY MEDICINE

## 2023-07-03 PROCEDURE — 80053 COMPREHEN METABOLIC PANEL: CPT | Performed by: FAMILY MEDICINE

## 2023-07-03 PROCEDURE — 82306 VITAMIN D 25 HYDROXY: CPT | Performed by: FAMILY MEDICINE

## 2023-07-03 PROCEDURE — 86431 RHEUMATOID FACTOR QUANT: CPT | Performed by: FAMILY MEDICINE

## 2023-07-03 ASSESSMENT — ANXIETY QUESTIONNAIRES
5. BEING SO RESTLESS THAT IT IS HARD TO SIT STILL: SEVERAL DAYS
GAD7 TOTAL SCORE: 8
3. WORRYING TOO MUCH ABOUT DIFFERENT THINGS: MORE THAN HALF THE DAYS
IF YOU CHECKED OFF ANY PROBLEMS ON THIS QUESTIONNAIRE, HOW DIFFICULT HAVE THESE PROBLEMS MADE IT FOR YOU TO DO YOUR WORK, TAKE CARE OF THINGS AT HOME, OR GET ALONG WITH OTHER PEOPLE: SOMEWHAT DIFFICULT
6. BECOMING EASILY ANNOYED OR IRRITABLE: SEVERAL DAYS
7. FEELING AFRAID AS IF SOMETHING AWFUL MIGHT HAPPEN: NOT AT ALL
2. NOT BEING ABLE TO STOP OR CONTROL WORRYING: MORE THAN HALF THE DAYS
1. FEELING NERVOUS, ANXIOUS, OR ON EDGE: SEVERAL DAYS
GAD7 TOTAL SCORE: 8
4. TROUBLE RELAXING: SEVERAL DAYS

## 2023-07-03 ASSESSMENT — PATIENT HEALTH QUESTIONNAIRE - PHQ9
SUM OF ALL RESPONSES TO PHQ QUESTIONS 1-9: 10
10. IF YOU CHECKED OFF ANY PROBLEMS, HOW DIFFICULT HAVE THESE PROBLEMS MADE IT FOR YOU TO DO YOUR WORK, TAKE CARE OF THINGS AT HOME, OR GET ALONG WITH OTHER PEOPLE: SOMEWHAT DIFFICULT
SUM OF ALL RESPONSES TO PHQ QUESTIONS 1-9: 10

## 2023-07-03 NOTE — NURSING NOTE
53 year old  Chief Complaint   Patient presents with     Fatigue     Ongoing for a year, Pt reports fatigue and despite lifestyle changes does not see improvement. They don't recover from exercise well and note muscle weakness.        Blood pressure 119/86, pulse 67, temperature 97.6  F (36.4  C), temperature source Skin, resp. rate 15, SpO2 98 %, not currently breastfeeding. There is no height or weight on file to calculate BMI.  Patient Active Problem List   Diagnosis     Urethral diverticulum     Chronic constipation     Anxiety and depression     Chronic fatigue     Gastrointestinal food sensitivity     Fibromyalgia     Weight gain     Recurrent major depressive disorder (H)     Major depressive disorder, recurrent episode, moderate (H)     Bulimia nervosa     Uncomplicated alcohol dependence (H)     Vitamin D deficiency     Ulcerative proctitis (H)     Ulcerative colitis (H)     Small intestinal bacterial overgrowth     Insomnia     Hypothyroidism     Endometriosis     Tension headache     Cervical pain       Wt Readings from Last 2 Encounters:   11/11/22 70.3 kg (155 lb)   03/08/22 70.3 kg (155 lb)     BP Readings from Last 3 Encounters:   07/03/23 119/86   11/11/22 116/75   03/08/22 112/72         Current Outpatient Medications   Medication     MAGNESIUM PO     multivitamin, therapeutic with minerals (THERA-VIT-M) TABS tablet     traZODone (DESYREL) 50 MG tablet     ciprofloxacin (CIPRO) 500 MG tablet     No current facility-administered medications for this visit.       Social History     Tobacco Use     Smoking status: Never     Smokeless tobacco: Never   Vaping Use     Vaping Use: Never used   Substance Use Topics     Alcohol use: Yes     Alcohol/week: 2.0 standard drinks of alcohol     Types: 1 Glasses of wine, 1 Shots of liquor per week     Comment: occasional wine or vodka and soda     Drug use: No       Health Maintenance Due   Topic Date Due     ADVANCE CARE PLANNING  Never done     HEPATITIS B  IMMUNIZATION (1 of 3 - 3-dose series) Never done     Pneumococcal Vaccine: Pediatrics (0 to 5 Years) and At-Risk Patients (6 to 64 Years) (1 - PCV) Never done     HIV SCREENING  Never done     HEPATITIS C SCREENING  Never done     MAMMO SCREENING  06/18/2019     COLORECTAL CANCER SCREENING  12/16/2020     HPV TEST  01/12/2021     PAP  01/12/2021     MEDICARE ANNUAL WELLNESS VISIT  11/01/2022       No results found for: PAP      July 3, 2023 10:29 AM

## 2023-07-05 LAB
ANA SER QL IF: NEGATIVE
CCP AB SER IA-ACNC: 0.7 U/ML
RHEUMATOID FACT SER NEPH-ACNC: <6 IU/ML

## 2023-08-17 PROBLEM — F90.0 ATTENTION DEFICIT HYPERACTIVITY DISORDER (ADHD), PREDOMINANTLY INATTENTIVE TYPE: Status: ACTIVE | Noted: 2023-08-17

## 2023-08-21 ENCOUNTER — TRANSFERRED RECORDS (OUTPATIENT)
Dept: HEALTH INFORMATION MANAGEMENT | Facility: CLINIC | Age: 53
End: 2023-08-21
Payer: COMMERCIAL

## 2023-08-21 ENCOUNTER — OFFICE VISIT (OUTPATIENT)
Dept: FAMILY MEDICINE | Facility: CLINIC | Age: 53
End: 2023-08-21
Payer: COMMERCIAL

## 2023-08-21 VITALS
SYSTOLIC BLOOD PRESSURE: 120 MMHG | HEART RATE: 77 BPM | DIASTOLIC BLOOD PRESSURE: 85 MMHG | OXYGEN SATURATION: 100 % | TEMPERATURE: 97.7 F

## 2023-08-21 DIAGNOSIS — R30.0 DYSURIA: Primary | ICD-10-CM

## 2023-08-21 DIAGNOSIS — R73.09 ELEVATED GLUCOSE: ICD-10-CM

## 2023-08-21 LAB
ALBUMIN UR-MCNC: NEGATIVE MG/DL
APPEARANCE UR: CLEAR
BILIRUB UR QL STRIP: NEGATIVE
COLOR UR AUTO: YELLOW
GLUCOSE UR STRIP-MCNC: NEGATIVE MG/DL
HBA1C MFR BLD: 5.7 %
HBA1C MFR BLD: 5.7 %
HGB UR QL STRIP: NEGATIVE
KETONES UR STRIP-MCNC: NEGATIVE MG/DL
LEUKOCYTE ESTERASE UR QL STRIP: NEGATIVE
NITRATE UR QL: NEGATIVE
PH UR STRIP: 5.5 [PH] (ref 5–7)
SP GR UR STRIP: <=1.005 (ref 1–1.03)
UROBILINOGEN UR STRIP-ACNC: 0.2 E.U./DL

## 2023-08-21 PROCEDURE — 87086 URINE CULTURE/COLONY COUNT: CPT | Performed by: FAMILY MEDICINE

## 2023-08-21 PROCEDURE — 83036 HEMOGLOBIN GLYCOSYLATED A1C: CPT | Performed by: FAMILY MEDICINE

## 2023-08-21 RX ORDER — DEXTROAMPHETAMINE/AMPHETAMINE 10 MG
10 CAPSULE, EXT RELEASE 24 HR ORAL DAILY
COMMUNITY
Start: 2023-08-14

## 2023-08-21 RX ORDER — PHENAZOPYRIDINE HYDROCHLORIDE 95 MG/1
190 TABLET ORAL 3 TIMES DAILY
Qty: 12 TABLET | Refills: 1 | Status: SHIPPED | OUTPATIENT
Start: 2023-08-21 | End: 2023-10-16

## 2023-08-21 NOTE — PROGRESS NOTES
"  Assessment & Plan   Problem List Items Addressed This Visit    None  Visit Diagnoses       Dysuria    -  Primary    Relevant Medications    phenazopyridine (AZO URINARY PAIN RELIEF) 95 MG tablet    Other Relevant Orders    Urinalysis Macroscopic (Completed)    Urine Culture Aerobic Bacterial    Adult Uro/Gyn  Referral    Elevated glucose        Relevant Orders    Hemoglobin A1c    Hemoglobin A1c           U/A convincing against likely UTI. Doris cannot identify any changes in medication, soaps, lotions. Possibly some sort of dermal/mucosal inflammation vs fungal irritation. Will trial AZO as noted as well as referral to UROGYN for further consideration.    Doris mentions that her last glucose returned at 110 and reports she was fasting at the time of the lab draw. Agreed to an A1c today to assess for possible insulin resistance. Given urinary issues as noted above this could be a contributing factor.     27 minutes spent on the date of the encounter doing chart review, history and exam, documentation and further activities as noted.      MD CECILLE Palacios [facility]    Subjective   Doris is a 53 year old, presenting for the following health issues:  Urinary Pain (Sx started 6 months ago, had a burning sensation following urination and it would go away, this would happen every couple weeks, but lately has gotten more frequent and painful. Went to NP clinic months ago but was negative for UTI. /Pt feels burning/pain \"in the first 2-3 inches of the urethra\". Denies cloudiness or odor. She has noticed drinking less water can increase frequency of these sx )      HPI   \"Burning pain after  urination that lasts for 10 minutes\"  - feels the pain essentially at her urethra: \"between my bladder and the outside\"  - denies fever, chills, nausea, foul smell, flank pain  - has a history of bladder infections but this feels different  - was seen with similar symptoms back in November of " 2022, had a negative U/A but was treated with ABX. Says she got better but doesn't know if it was because of the ABX  - today, she reports she has been getting these symptoms intermittently for the past 6 months  - no blood in urine, no vaginal discharge      Review of Systems   Constitutional, HEENT, cardiovascular, pulmonary, gi and gu systems are negative, except as otherwise noted.      Objective    /85 (BP Location: Right arm, Patient Position: Sitting, Cuff Size: Adult Regular)   Pulse 77   Temp 97.7  F (36.5  C) (Temporal)   LMP  (LMP Unknown)   SpO2 100%   There is no height or weight on file to calculate BMI.  Physical Exam   GENERAL: healthy, alert and no distress  NECK: no adenopathy, no asymmetry, masses, or scars and thyroid normal to palpation  RESP: lungs clear to auscultation - no rales, rhonchi or wheezes  CV: regular rate and rhythm, normal S1 S2, no S3 or S4, no murmur, click or rub, no peripheral edema and peripheral pulses strong  ABDOMEN: soft, nontender, no hepatosplenomegaly, no masses and bowel sounds normal  MS: no gross musculoskeletal defects noted, no edema

## 2023-08-21 NOTE — NURSING NOTE
"53 year old    Chief Complaint   Patient presents with    Urinary Pain     Sx started 6 months ago, had a burning sensation following urination and it would go away, this would happen every couple weeks, but lately has gotten more frequent and painful. Went to NP clinic months ago but was negative for UTI.   Pt feels burning/pain \"in the first 2-3 inches of the urethra\". Denies cloudiness or odor. She has noticed drinking less water can increase frequency of these sx             Blood pressure 120/85, pulse 77, temperature 97.7  F (36.5  C), temperature source Temporal, SpO2 100 %, not currently breastfeeding. There is no height or weight on file to calculate BMI.    Patient Active Problem List   Diagnosis    Urethral diverticulum    Chronic constipation    Anxiety and depression    Chronic fatigue    Gastrointestinal food sensitivity    Fibromyalgia    Weight gain    Recurrent major depressive disorder (H)    Major depressive disorder, recurrent episode, moderate (H)    Bulimia nervosa    Uncomplicated alcohol dependence (H)    Vitamin D deficiency    Ulcerative proctitis (H)    Ulcerative colitis (H)    Small intestinal bacterial overgrowth    Insomnia    Hypothyroidism    Endometriosis    Tension headache    Cervical pain    Attention deficit hyperactivity disorder (ADHD), predominantly inattentive type              Wt Readings from Last 2 Encounters:   11/11/22 70.3 kg (155 lb)   03/08/22 70.3 kg (155 lb)       BP Readings from Last 3 Encounters:   08/21/23 120/85   07/03/23 119/86   11/11/22 116/75                Current Outpatient Medications   Medication    ADDERALL XR 10 MG 24 hr capsule    MAGNESIUM PO    multivitamin, therapeutic with minerals (THERA-VIT-M) TABS tablet    traZODone (DESYREL) 50 MG tablet     No current facility-administered medications for this visit.              Social History     Tobacco Use    Smoking status: Never    Smokeless tobacco: Never   Vaping Use    Vaping Use: Never used "   Substance Use Topics    Alcohol use: Yes     Alcohol/week: 2.0 standard drinks of alcohol     Types: 1 Glasses of wine, 1 Shots of liquor per week     Comment: occasional wine or vodka and soda    Drug use: No              Health Maintenance Due   Topic Date Due    ADVANCE CARE PLANNING  Never done    HEPATITIS B IMMUNIZATION (1 of 3 - 3-dose series) Never done    Pneumococcal Vaccine: Pediatrics (0 to 5 Years) and At-Risk Patients (6 to 64 Years) (1 - PCV) Never done    HIV SCREENING  Never done    HEPATITIS C SCREENING  Never done    MAMMO SCREENING  06/18/2019    COLORECTAL CANCER SCREENING  12/16/2020    HPV TEST  01/12/2021    PAP  01/12/2021    MEDICARE ANNUAL WELLNESS VISIT  11/01/2022            No results found for: PAP           August 21, 2023 4:12 PM

## 2023-08-23 LAB — BACTERIA UR CULT: NORMAL

## 2023-10-04 ENCOUNTER — HOSPITAL ENCOUNTER (EMERGENCY)
Facility: CLINIC | Age: 53
Discharge: HOME | End: 2023-10-04
Attending: EMERGENCY MEDICINE | Admitting: EMERGENCY MEDICINE
Payer: COMMERCIAL

## 2023-10-04 ENCOUNTER — APPOINTMENT (OUTPATIENT)
Dept: CT IMAGING | Facility: CLINIC | Age: 53
End: 2023-10-04
Attending: EMERGENCY MEDICINE
Payer: COMMERCIAL

## 2023-10-04 ENCOUNTER — NURSE TRIAGE (OUTPATIENT)
Dept: NURSING | Facility: CLINIC | Age: 53
End: 2023-10-04
Payer: COMMERCIAL

## 2023-10-04 VITALS
DIASTOLIC BLOOD PRESSURE: 69 MMHG | HEIGHT: 71 IN | RESPIRATION RATE: 16 BRPM | WEIGHT: 155 LBS | SYSTOLIC BLOOD PRESSURE: 126 MMHG | TEMPERATURE: 97.7 F | BODY MASS INDEX: 21.7 KG/M2 | HEART RATE: 58 BPM | OXYGEN SATURATION: 100 %

## 2023-10-04 DIAGNOSIS — R10.9 LEFT FLANK PAIN: ICD-10-CM

## 2023-10-04 LAB
ALBUMIN SERPL BCG-MCNC: 4.4 G/DL (ref 3.5–5.2)
ALBUMIN UR-MCNC: NEGATIVE MG/DL
ALP SERPL-CCNC: 96 U/L (ref 35–104)
ALT SERPL W P-5'-P-CCNC: 17 U/L (ref 0–50)
ANION GAP SERPL CALCULATED.3IONS-SCNC: 10 MMOL/L (ref 7–15)
APPEARANCE UR: CLEAR
AST SERPL W P-5'-P-CCNC: 18 U/L (ref 0–45)
ATRIAL RATE - MUSE: 58 BPM
BASO+EOS+MONOS # BLD AUTO: NORMAL 10*3/UL
BASO+EOS+MONOS NFR BLD AUTO: NORMAL %
BASOPHILS # BLD AUTO: 0 10E3/UL (ref 0–0.2)
BASOPHILS NFR BLD AUTO: 0 %
BILIRUB SERPL-MCNC: 0.3 MG/DL
BILIRUB UR QL STRIP: NEGATIVE
BUN SERPL-MCNC: 11.7 MG/DL (ref 6–20)
CALCIUM SERPL-MCNC: 9.1 MG/DL (ref 8.6–10)
CHLORIDE SERPL-SCNC: 103 MMOL/L (ref 98–107)
COLOR UR AUTO: ABNORMAL
CREAT SERPL-MCNC: 0.82 MG/DL (ref 0.51–0.95)
DEPRECATED HCO3 PLAS-SCNC: 25 MMOL/L (ref 22–29)
DIASTOLIC BLOOD PRESSURE - MUSE: NORMAL MMHG
EGFRCR SERPLBLD CKD-EPI 2021: 85 ML/MIN/1.73M2
EOSINOPHIL # BLD AUTO: 0 10E3/UL (ref 0–0.7)
EOSINOPHIL NFR BLD AUTO: 0 %
ERYTHROCYTE [DISTWIDTH] IN BLOOD BY AUTOMATED COUNT: 11.8 % (ref 10–15)
GLUCOSE SERPL-MCNC: 141 MG/DL (ref 70–99)
GLUCOSE UR STRIP-MCNC: NEGATIVE MG/DL
HCT VFR BLD AUTO: 42 % (ref 35–47)
HGB BLD-MCNC: 14 G/DL (ref 11.7–15.7)
HGB UR QL STRIP: NEGATIVE
HOLD SPECIMEN: NORMAL
HOLD SPECIMEN: NORMAL
IMM GRANULOCYTES # BLD: 0 10E3/UL
IMM GRANULOCYTES NFR BLD: 0 %
INTERPRETATION ECG - MUSE: NORMAL
KETONES UR STRIP-MCNC: NEGATIVE MG/DL
LEUKOCYTE ESTERASE UR QL STRIP: NEGATIVE
LIPASE SERPL-CCNC: 53 U/L (ref 13–60)
LYMPHOCYTES # BLD AUTO: 1.2 10E3/UL (ref 0.8–5.3)
LYMPHOCYTES NFR BLD AUTO: 13 %
MCH RBC QN AUTO: 30.4 PG (ref 26.5–33)
MCHC RBC AUTO-ENTMCNC: 33.3 G/DL (ref 31.5–36.5)
MCV RBC AUTO: 91 FL (ref 78–100)
MONOCYTES # BLD AUTO: 0.4 10E3/UL (ref 0–1.3)
MONOCYTES NFR BLD AUTO: 4 %
MUCOUS THREADS #/AREA URNS LPF: PRESENT /LPF
NEUTROPHILS # BLD AUTO: 7.9 10E3/UL (ref 1.6–8.3)
NEUTROPHILS NFR BLD AUTO: 83 %
NITRATE UR QL: NEGATIVE
NRBC # BLD AUTO: 0 10E3/UL
NRBC BLD AUTO-RTO: 0 /100
P AXIS - MUSE: 62 DEGREES
PH UR STRIP: 7 [PH] (ref 5–7)
PLATELET # BLD AUTO: 252 10E3/UL (ref 150–450)
POTASSIUM SERPL-SCNC: 3.8 MMOL/L (ref 3.4–5.3)
PR INTERVAL - MUSE: 174 MS
PROT SERPL-MCNC: 6.8 G/DL (ref 6.4–8.3)
QRS DURATION - MUSE: 72 MS
QT - MUSE: 440 MS
QTC - MUSE: 431 MS
R AXIS - MUSE: 98 DEGREES
RBC # BLD AUTO: 4.61 10E6/UL (ref 3.8–5.2)
RBC URINE: 1 /HPF
SODIUM SERPL-SCNC: 138 MMOL/L (ref 135–145)
SP GR UR STRIP: 1.02 (ref 1–1.03)
SQUAMOUS EPITHELIAL: <1 /HPF
SYSTOLIC BLOOD PRESSURE - MUSE: NORMAL MMHG
T AXIS - MUSE: 51 DEGREES
UROBILINOGEN UR STRIP-MCNC: NORMAL MG/DL
VENTRICULAR RATE- MUSE: 58 BPM
WBC # BLD AUTO: 9.5 10E3/UL (ref 4–11)
WBC URINE: 1 /HPF

## 2023-10-04 PROCEDURE — 96361 HYDRATE IV INFUSION ADD-ON: CPT

## 2023-10-04 PROCEDURE — 74177 CT ABD & PELVIS W/CONTRAST: CPT

## 2023-10-04 PROCEDURE — 36415 COLL VENOUS BLD VENIPUNCTURE: CPT | Performed by: EMERGENCY MEDICINE

## 2023-10-04 PROCEDURE — 250N000011 HC RX IP 250 OP 636: Mod: JZ | Performed by: EMERGENCY MEDICINE

## 2023-10-04 PROCEDURE — 99285 EMERGENCY DEPT VISIT HI MDM: CPT | Mod: 25

## 2023-10-04 PROCEDURE — 85025 COMPLETE CBC W/AUTO DIFF WBC: CPT | Performed by: EMERGENCY MEDICINE

## 2023-10-04 PROCEDURE — 96374 THER/PROPH/DIAG INJ IV PUSH: CPT | Mod: 59

## 2023-10-04 PROCEDURE — 81001 URINALYSIS AUTO W/SCOPE: CPT | Performed by: EMERGENCY MEDICINE

## 2023-10-04 PROCEDURE — 250N000011 HC RX IP 250 OP 636: Performed by: EMERGENCY MEDICINE

## 2023-10-04 PROCEDURE — 83690 ASSAY OF LIPASE: CPT | Performed by: EMERGENCY MEDICINE

## 2023-10-04 PROCEDURE — 96375 TX/PRO/DX INJ NEW DRUG ADDON: CPT

## 2023-10-04 PROCEDURE — 250N000009 HC RX 250: Performed by: EMERGENCY MEDICINE

## 2023-10-04 PROCEDURE — 80053 COMPREHEN METABOLIC PANEL: CPT | Performed by: EMERGENCY MEDICINE

## 2023-10-04 PROCEDURE — 93005 ELECTROCARDIOGRAM TRACING: CPT

## 2023-10-04 PROCEDURE — 258N000003 HC RX IP 258 OP 636: Performed by: EMERGENCY MEDICINE

## 2023-10-04 RX ORDER — IOPAMIDOL 755 MG/ML
78 INJECTION, SOLUTION INTRAVASCULAR ONCE
Status: COMPLETED | OUTPATIENT
Start: 2023-10-04 | End: 2023-10-04

## 2023-10-04 RX ORDER — KETOROLAC TROMETHAMINE 15 MG/ML
15 INJECTION, SOLUTION INTRAMUSCULAR; INTRAVENOUS ONCE
Status: COMPLETED | OUTPATIENT
Start: 2023-10-04 | End: 2023-10-04

## 2023-10-04 RX ADMIN — KETOROLAC TROMETHAMINE 15 MG: 15 INJECTION, SOLUTION INTRAMUSCULAR; INTRAVENOUS at 09:44

## 2023-10-04 RX ADMIN — IOPAMIDOL 78 ML: 755 INJECTION, SOLUTION INTRAVENOUS at 10:41

## 2023-10-04 RX ADMIN — FAMOTIDINE 20 MG: 10 INJECTION INTRAVENOUS at 09:16

## 2023-10-04 RX ADMIN — SODIUM CHLORIDE 62 ML: 9 INJECTION, SOLUTION INTRAVENOUS at 10:41

## 2023-10-04 RX ADMIN — SODIUM CHLORIDE 500 ML: 9 INJECTION, SOLUTION INTRAVENOUS at 09:14

## 2023-10-04 ASSESSMENT — ACTIVITIES OF DAILY LIVING (ADL)
ADLS_ACUITY_SCORE: 33
ADLS_ACUITY_SCORE: 35

## 2023-10-04 NOTE — DISCHARGE INSTRUCTIONS
I recommend picking up over-the-counter Prilosec or Pepcid and taking this in case this is a stomach acid related as we do not have any way to prove this here in the emergency department.  Otherwise, I recommend heat packs, Tylenol for mild discomfort gentle stretching and following up with your primary doctor.  You should return here if she develop chest pain, chest pressure or shortness of breath or significant worsening of your pain.

## 2023-10-04 NOTE — ED TRIAGE NOTES
Pt reports ULQ Pain at 1600 yesterday, pt reports worsening during the evening, wrapping around to back, pt endorses nausea, pt denies urinary symptoms.      Triage Assessment       Row Name 10/04/23 0856       Triage Assessment (Adult)    Airway WDL WDL       Cognitive/Neuro/Behavioral WDL    Level of Consciousness alert    Arousal Level opens eyes spontaneously    Orientation oriented x 4       Jose Ramon Coma Scale    Best Eye Response 4-->(E4) spontaneous    Best Motor Response 6-->(M6) obeys commands    Best Verbal Response 5-->(V5) oriented    Jose Ramon Coma Scale Score 15

## 2023-10-04 NOTE — ED PROVIDER NOTES
"  History     Chief Complaint:  Abdominal Pain       HPI   Doris Fritz is a 53 year old female past medical history seen for fibromyalgia presenting for evaluation of left-sided abdominal pain.  She reports some mild malaise yesterday, feeling generally weak and gradual onset left-sided upper abdominal and flank pain.  Denies any change with bowel or bladder habits.  Denies any fevers or chills.  No vomiting or diarrhea.  Has had history of endometriosis with exploratory laparoscopy and ovarian cyst removal.  She denies any chest pain, pain with deep breath or shortness of breath.    Independent Historian:   None - Patient Only    Review of External Notes:   None      Medications:    ADDERALL XR 10 MG 24 hr capsule  MAGNESIUM PO  multivitamin, therapeutic with minerals (THERA-VIT-M) TABS tablet  phenazopyridine (AZO URINARY PAIN RELIEF) 95 MG tablet  traZODone (DESYREL) 50 MG tablet        Past Medical History:    Past Medical History:   Diagnosis Date    Depressive disorder     Fibromyalgia        Past Surgical History:    Past Surgical History:   Procedure Laterality Date    ABDOMEN SURGERY      laparoscopy check ovaries    AS REDUCTION OF LARGE BREAST      DENTAL SURGERY      DILATION AND CURETTAGE      miscarriage    MAMMOPLASTY REDUCTION          Physical Exam   Patient Vitals for the past 24 hrs:   BP Temp Temp src Pulse Resp SpO2 Height Weight   10/04/23 0853 126/69 97.7  F (36.5  C) Oral 58 16 100 % 1.803 m (5' 11\") 70.3 kg (155 lb)        Physical Exam  Constitutional: Alert, attentive, GCS 15   Eyes: EOM are normal, anicteric, conjugate gaze  CV: distal extremities warm, well perfused  Chest: Non-labored breathing on RA  GI:  non tender. No distension. No guarding or rebound.    Neurological: Alert, attentive, moving all extremities equally.   Skin: Skin is warm and dry.      Emergency Department Course     ECG results from 10/04/23   EKG 12 lead     Value    Systolic Blood Pressure     Diastolic " Blood Pressure     Ventricular Rate 58    Atrial Rate 58    ME Interval 174    QRS Duration 72        QTc 431    P Axis 62    R AXIS 98    T Axis 51    Interpretation ECG      Sinus bradycardia  Possible Left atrial enlargement  Rightward axis  Borderline ECG  No previous ECGs available           Imaging:  Abd/pelvis CT,  IV  contrast only TRAUMA / AAA    (Results Pending)      Report per radiology    Laboratory:  Labs Ordered and Resulted from Time of ED Arrival to Time of ED Departure   CBC WITH PLATELETS AND DIFFERENTIAL       Result Value    WBC Count 9.5      RBC Count 4.61      Hemoglobin 14.0      Hematocrit 42.0      MCV 91      MCH 30.4      MCHC 33.3      RDW 11.8      Platelet Count 252      % Neutrophils 83      % Lymphocytes 13      % Monocytes 4      Mids % (Monos, Eos, Basos)        % Eosinophils 0      % Basophils 0      % Immature Granulocytes 0      NRBCs per 100 WBC 0      Absolute Neutrophils 7.9      Absolute Lymphocytes 1.2      Absolute Monocytes 0.4      Mids Abs (Monos, Eos, Basos)        Absolute Eosinophils 0.0      Absolute Basophils 0.0      Absolute Immature Granulocytes 0.0      Absolute NRBCs 0.0     COMPREHENSIVE METABOLIC PANEL   LIPASE   ROUTINE UA WITH MICROSCOPIC          Emergency Department Course & Assessments:  Interventions:  Medications   sodium chloride 0.9% BOLUS 500 mL (500 mLs Intravenous $New Bag 10/4/23 0989)   ketorolac (TORADOL) injection 15 mg (has no administration in time range)   famotidine (PEPCID) injection 20 mg (20 mg Intravenous $Given 10/4/23 0998)          Independent Interpretation (X-rays, CTs, rhythm strip):  I personally reviewed her CT, see no evidence of hydronephrosis or free air.    Consultations/Discussion of Management or Tests:  None        Social Determinants of Health affecting care:   None    Disposition:  The patient was discharged to home.     Impression & Plan      Medical Decision Makin-year-old man past medical history seen  for fibromyalgia, endometriosis presenting for gradual onset left-sided upper abdominal pain that radiates into her flank.  She has no chest symptoms, I do not suspect ACS or PE.  Screening EKG is unremarkable.  She has no abdominal tenderness on exam with lower suspicion of obvious perforation, obstruction or infection however due to pain radiating to the flank, concern was for potential renal pathology namely kidney stone prompting CT imaging which shows no acute findings.  Labs including UA are unremarkable.  Given absence of findings, I recommended Tylenol for mild pain, stomach acid medication and PCP follow-up.  Return precautions.  She was discharged      Diagnosis:    ICD-10-CM    1. Left flank pain  R10.9            Nestor Forrester MD  [employer]  11:12 AM 10/04/23          Nestor Forrester MD  10/04/23 1112

## 2023-10-04 NOTE — TELEPHONE ENCOUNTER
Patient calling.    C/o pain on left side below rib cage that started yesterday evening around 6pm. Tender to touch, and she reports that the pain is getting worse. Rates pain 7/10. Also c/o nausea. No difficulty breathing.     Disposition: Go to ED Now. Nearby locations given. Patient verbalized understanding and had no further questions.    Mayi Carreon RN on 10/4/2023 at 8:21 AM     Reason for Disposition   SEVERE abdominal pain (e.g., excruciating)    Additional Information   Negative: SEVERE difficulty breathing (e.g., struggling for each breath, speaks in single words)   Negative: Difficult to awaken or acting confused (e.g., disoriented, slurred speech)   Negative: Shock suspected (e.g., cold/pale/clammy skin, too weak to stand, low BP, rapid pulse)   Negative: Passed out (i.e., lost consciousness, collapsed and was not responding)   Negative: SEVERE difficulty breathing (e.g., struggling for each breath, speaks in single words)   Negative: Shock suspected (e.g., cold/pale/clammy skin, too weak to stand, low BP, rapid pulse)   Negative: Difficult to awaken or acting confused (e.g., disoriented, slurred speech)   Negative: Passed out (i.e., lost consciousness, collapsed and was not responding)   Negative: Visible sweat on face or sweat is dripping down   Negative: Sounds like a life-threatening emergency to the triager    Protocols used: Chest Pain-A-OH, Abdominal Pain - Upper-A-OH

## 2023-10-16 ENCOUNTER — VIRTUAL VISIT (OUTPATIENT)
Dept: FAMILY MEDICINE | Facility: CLINIC | Age: 53
End: 2023-10-16
Payer: COMMERCIAL

## 2023-10-16 DIAGNOSIS — U07.1 INFECTION DUE TO 2019 NOVEL CORONAVIRUS: Primary | ICD-10-CM

## 2023-10-16 ASSESSMENT — ENCOUNTER SYMPTOMS
DIARRHEA: 0
CHILLS: 1
NAUSEA: 0
VOMITING: 0
HEADACHES: 1
RHINORRHEA: 1
FATIGUE: 1
SHORTNESS OF BREATH: 1
COUGH: 1
SORE THROAT: 1

## 2023-10-16 NOTE — PATIENT INSTRUCTIONS
Call 911 anytime you think you may need emergency care. For example, call if you have life-threatening symptoms, such as:    You have severe trouble breathing. (You can't talk at all.)     You have constant chest pain or pressure.     You are severely dizzy or lightheaded.     You are confused or can't think clearly.     You have pale, gray, or blue-colored skin or lips.     You pass out (lose consciousness) or are very hard to wake up.   Call your doctor now or seek immediate medical care if:    You have moderate trouble breathing. (You can't speak a full sentence.)     You are coughing up blood.     You have signs of low blood pressure. These include feeling lightheaded; being too weak to stand; and having cold, pale, clammy skin.   Watch closely for changes in your health, and be sure to contact your doctor if:    Your symptoms get worse.     You are not getting better as expected.     You have new or worse symptoms of anxiety, depression, nightmares, or flashbacks.

## 2023-10-16 NOTE — PROGRESS NOTES
Doris is a 53 year old who is being evaluated via a billable telephone visit.      What phone number would you like to be contacted at? [phone]  How would you like to obtain your AVS? Nati  Distant Location (provider location):  On-site        Subjective   Doris is a 53 year old, presenting for the following health issues:  Covid Concern (Tested covid positive and symptoms started on Saturday )        HPI       53-year-old female past medical history (per chart review) fibromyalgia, depressive disorder, and ulcerative colitis presents to telephone visit to discuss COVID infection.  Her COVID symptoms started 2 days ago, 10/14/2023, and include fatigue, chills, sore throat, runny nose, cough, chest congestion, shortness of breath on exertion, and headaches.  Patient denies chest pain, shortness of breath at rest, or GI symptoms.  She has used ibuprofen and Tylenol for symptomatic relief.  This is her second COVID infection, she took Paxlovid during her first infection and did very well with it without side effects. She reports that her current symptoms are more severe when compared to her first time with COVID.   She believes that she has had 4 doses of the COVID-vaccine. No other acute concerns/symptoms at time of exam.            Review of Systems   Constitutional:  Positive for chills and fatigue.   HENT:  Positive for rhinorrhea and sore throat.    Respiratory:  Positive for cough and shortness of breath.    Cardiovascular:  Negative for chest pain.   Gastrointestinal:  Negative for diarrhea, nausea and vomiting.   Neurological:  Positive for headaches.   Constitutional, HEENT, cardiovascular, pulmonary, gi and gu systems are negative, except as otherwise noted.    Current Outpatient Medications   Medication    ADDERALL XR 10 MG 24 hr capsule    MAGNESIUM PO    multivitamin, therapeutic with minerals (THERA-VIT-M) TABS tablet        traZODone (DESYREL) 50 MG tablet     No current  facility-administered medications for this visit.         Past Medical History:   Diagnosis Date    Depressive disorder     Fibromyalgia        Past Surgical History:   Procedure Laterality Date    ABDOMEN SURGERY      laparoscopy check ovaries    AS REDUCTION OF LARGE BREAST      DENTAL SURGERY      DILATION AND CURETTAGE      miscarriage    MAMMOPLASTY REDUCTION         Family History   Problem Relation Age of Onset    Pulmonary fibrosis Mother 65    Chronic Obstructive Pulmonary Disease Father     Heart Disease Father         heart monitor, arrhythmia?    Anxiety Disorder Sister     Psoriasis Sister     Hypothyroidism Sister     Heart Disease Paternal Grandfather     Idiopathic pulmonary fibrosis Mother     Hyperthyroidism Mother     Hyperthyroidism Sister     Hypothyroidism Maternal Grandmother     Parkinsonism Maternal Grandfather        Social History     Tobacco Use    Smoking status: Never    Smokeless tobacco: Never   Substance Use Topics    Alcohol use: Yes     Alcohol/week: 2.0 standard drinks of alcohol     Types: 1 Glasses of wine, 1 Shots of liquor per week     Comment: occasional wine or vodka and soda             Objective           Vitals:  No vitals were obtained today due to virtual visit.    Physical Exam   healthy, alert, and no distress  PSYCH: Alert and oriented times 3; coherent speech, normal   rate and volume, able to articulate logical thoughts, able   to abstract reason, no tangential thoughts, no hallucinations   or delusions  Her affect is normal  RESP: No cough, no audible wheezing, able to talk in full sentences  Remainder of exam unable to be completed due to telephone visits    Admission on 10/04/2023, Discharged on 10/04/2023   Component Date Value Ref Range Status    Sodium 10/04/2023 138  135 - 145 mmol/L Final    Reference intervals for this test were updated on 09/26/2023 to more accurately reflect our healthy population. There may be differences in the flagging of prior  results with similar values performed with this method. Interpretation of those prior results can be made in the context of the updated reference intervals.     Potassium 10/04/2023 3.8  3.4 - 5.3 mmol/L Final    Carbon Dioxide (CO2) 10/04/2023 25  22 - 29 mmol/L Final    Anion Gap 10/04/2023 10  7 - 15 mmol/L Final    Urea Nitrogen 10/04/2023 11.7  6.0 - 20.0 mg/dL Final    Creatinine 10/04/2023 0.82  0.51 - 0.95 mg/dL Final    GFR Estimate 10/04/2023 85  >60 mL/min/1.73m2 Final    Calcium 10/04/2023 9.1  8.6 - 10.0 mg/dL Final    Chloride 10/04/2023 103  98 - 107 mmol/L Final    Glucose 10/04/2023 141 (H)  70 - 99 mg/dL Final    Alkaline Phosphatase 10/04/2023 96  35 - 104 U/L Final    AST 10/04/2023 18  0 - 45 U/L Final    Reference intervals for this test were updated on 6/12/2023 to more accurately reflect our healthy population. There may be differences in the flagging of prior results with similar values performed with this method. Interpretation of those prior results can be made in the context of the updated reference intervals.    ALT 10/04/2023 17  0 - 50 U/L Final    Reference intervals for this test were updated on 6/12/2023 to more accurately reflect our healthy population. There may be differences in the flagging of prior results with similar values performed with this method. Interpretation of those prior results can be made in the context of the updated reference intervals.      Protein Total 10/04/2023 6.8  6.4 - 8.3 g/dL Final    Albumin 10/04/2023 4.4  3.5 - 5.2 g/dL Final    Bilirubin Total 10/04/2023 0.3  <=1.2 mg/dL Final    Lipase 10/04/2023 53  13 - 60 U/L Final    Color Urine 10/04/2023 Light Yellow  Colorless, Straw, Light Yellow, Yellow Final    Appearance Urine 10/04/2023 Clear  Clear Final    Glucose Urine 10/04/2023 Negative  Negative mg/dL Final    Bilirubin Urine 10/04/2023 Negative  Negative Final    Ketones Urine 10/04/2023 Negative  Negative mg/dL Final    Specific Gravity Urine  10/04/2023 1.024  1.003 - 1.035 Final    Blood Urine 10/04/2023 Negative  Negative Final    pH Urine 10/04/2023 7.0  5.0 - 7.0 Final    Protein Albumin Urine 10/04/2023 Negative  Negative mg/dL Final    Urobilinogen Urine 10/04/2023 Normal  Normal, 2.0 mg/dL Final    Nitrite Urine 10/04/2023 Negative  Negative Final    Leukocyte Esterase Urine 10/04/2023 Negative  Negative Final    Mucus Urine 10/04/2023 Present (A)  None Seen /LPF Final    RBC Urine 10/04/2023 1  <=2 /HPF Final    WBC Urine 10/04/2023 1  <=5 /HPF Final    Squamous Epithelials Urine 10/04/2023 <1  <=1 /HPF Final    WBC Count 10/04/2023 9.5  4.0 - 11.0 10e3/uL Final    RBC Count 10/04/2023 4.61  3.80 - 5.20 10e6/uL Final    Hemoglobin 10/04/2023 14.0  11.7 - 15.7 g/dL Final    Hematocrit 10/04/2023 42.0  35.0 - 47.0 % Final    MCV 10/04/2023 91  78 - 100 fL Final    MCH 10/04/2023 30.4  26.5 - 33.0 pg Final    MCHC 10/04/2023 33.3  31.5 - 36.5 g/dL Final    RDW 10/04/2023 11.8  10.0 - 15.0 % Final    Platelet Count 10/04/2023 252  150 - 450 10e3/uL Final    % Neutrophils 10/04/2023 83  % Final    % Lymphocytes 10/04/2023 13  % Final    % Monocytes 10/04/2023 4  % Final    % Eosinophils 10/04/2023 0  % Final    % Basophils 10/04/2023 0  % Final    % Immature Granulocytes 10/04/2023 0  % Final    NRBCs per 100 WBC 10/04/2023 0  <1 /100 Final    Absolute Neutrophils 10/04/2023 7.9  1.6 - 8.3 10e3/uL Final    Absolute Lymphocytes 10/04/2023 1.2  0.8 - 5.3 10e3/uL Final    Absolute Monocytes 10/04/2023 0.4  0.0 - 1.3 10e3/uL Final    Absolute Eosinophils 10/04/2023 0.0  0.0 - 0.7 10e3/uL Final    Absolute Basophils 10/04/2023 0.0  0.0 - 0.2 10e3/uL Final    Absolute Immature Granulocytes 10/04/2023 0.0  <=0.4 10e3/uL Final    Absolute NRBCs 10/04/2023 0.0  10e3/uL Final    Hold Specimen 10/04/2023 [facility]   Final    Hold Specimen 10/04/2023 [facility]   Final    Ventricular Rate 10/04/2023 58  BPM Corrected    Atrial Rate 10/04/2023 58  BPM Corrected    GA Interval  10/04/2023 174  ms Corrected    QRS Duration 10/04/2023 72  ms Corrected    QT 10/04/2023 440  ms Corrected    QTc 10/04/2023 431  ms Corrected    P Axis 10/04/2023 62  degrees Corrected    R AXIS 10/04/2023 98  degrees Corrected    T Axis 10/04/2023 51  degrees Corrected    Interpretation ECG 10/04/2023    Corrected                    Value:Sinus bradycardia  Possible Left atrial enlargement  Rightward axis  Borderline ECG  No previous ECGs available  Confirmed by GENERATED REPORT, COMPUTER ([id number]),  Rossana Clarke ([id number]) on 10/4/2023 11:51:59 AM  Also confirmed by GENERATED REPORT, COMPUTER ([id number]),  Rossana Clarke ([id number])  on 10/4/2023 11:52:07 AM           Assessment/Plan  1. Infection due to 2019 novel coronavirus  This is a 53-year-old female with past medical history significant for (per chart review) fibromyalgia, depressive disorder, and ulcerative colitis who presents to discuss her current COVID-19 infection.  Discussed monitoring versus starting Paxlovid considering risk of side effects/rebound with Paxlovid.  After discussion, patient opted for Paxlovid prescription.  Patient instructed to take half tablet of trazodone while on Paxlovid.    Patient instructed to go to emergency room for any concerning symptoms such as chest pain, shortness of breath at rest, intractable fever/nausea/vomiting/diarrhea.  - nirmatrelvir and ritonavir (PAXLOVID) 300 mg/100 mg therapy pack; Take 3 tablets by mouth 2 times daily for 5 days Take half tablet of trazodone at bedtime while on paxlovid  Dispense: 30 tablet; Refill: 0      Follow-up as needed or if symptoms worsen/fail improve.  ER precautions as noted above.    All questions/concerns addressed. Patient stated understanding/agreement to plan of care.      BRENTON Madrid, CNP  [facility] [employer]      Phone call duration: 6:57 minutes      Note: Chart documentation was done in part with Dragon Voice Recognition  software.  Although reviewed after completion, some word and grammatical errors may remain. Please contact author for any clarification or concerns.

## 2023-10-17 ENCOUNTER — VIRTUAL VISIT (OUTPATIENT)
Dept: UROLOGY | Facility: CLINIC | Age: 53
End: 2023-10-17
Attending: FAMILY MEDICINE
Payer: COMMERCIAL

## 2023-10-17 VITALS — WEIGHT: 155 LBS | HEIGHT: 71 IN | BODY MASS INDEX: 21.7 KG/M2

## 2023-10-17 DIAGNOSIS — N36.1 URETHRAL DIVERTICULUM: ICD-10-CM

## 2023-10-17 DIAGNOSIS — R30.0 DYSURIA: Primary | ICD-10-CM

## 2023-10-17 PROCEDURE — 99203 OFFICE O/P NEW LOW 30 MIN: CPT | Mod: VID | Performed by: OBSTETRICS & GYNECOLOGY

## 2023-10-17 ASSESSMENT — PATIENT HEALTH QUESTIONNAIRE - PHQ9: SUM OF ALL RESPONSES TO PHQ QUESTIONS 1-9: 0

## 2023-10-17 NOTE — LETTER
10/17/2023       RE: Doris Fritz  [address]  [place] [address]     Dear Colleague,    Thank you for referring your patient, Doris Fritz, to the [facility] [place] at [facility]. Please see a copy of my visit note below.    Virtual Visit Details    Type of service:  Video Visit   Video Start Time: 2:05 PM  Video End Time:2:39 PM    Originating Location (pt. Location): Home    Distant Location (provider location):  On-site  Platform used for Video Visit: [facility]      Chief complaint: pain after urination on and off    Subjective     Doris Fritz is a 53 year old  G0 with hypothyroidism, Ulcerative colitis ( in remission), fibromyalgia, SIBO, endometriosis, Anxiety and depression who presents for a video visit today for pain after urination . She was recently evaluated in the ED for left flank pain and was found to have normal UA and  CT abd/Pelvis that showed normal upper urinary tract and possible urethral diverticulum ( Slightly smaller cystic structure encircling the  left lateral posterior aspect of the urethra measuring 2.4 x 1.3 cm, previously 3.1 x 1.7 cm in 2018)     Today she says that she was getting some symptoms like UTI ( pain with urination) starting in Nov 2022. Her work up was negative but she was given antibiotics. Since then every other week or so, she will feel a sharp pain after urination and sometimes it will last 1-5 minutes. This past summer, it was happening multiple times a week and will last for about 10 minutes. Currently not having this pain. Azo did not help. It often resolves after one void. There seems to be correlation with how much water she drinks. Worse if she doesn't drink enough water. She normally drinks 4-5 (32 oz bottles). Voids every hour during the day and 2-4 times at night. Says she is thirsty which is why she drinks more ( takes Adderall). Also drinks sparkling water and 2  cups of coffee. No gross hematuria or recurrent UTIs. No incontinence. She is unsure if she is emptying her bladder completely. She has to go badly but  not much comes out. She has had a shy bladder since she has been a child. She is a very tense person per her description and says she has been working on her mind-body connection. Has worked with pelvic floor PT and says it may have helped her. 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     CT ABDOMEN AND PELVIS WITH CONTRAST 10/4/2023                                                                      IMPRESSION:   1.  No acute abnormality identified.  2.  Stable lobulated soft tissue lesion at the left pelvis that is  suggestive for an exophytic fibroid compared to 9/18/2018.  3.  Stable cystic structure encircling the left lateral posterior  urethra as compared to 9/18/2018. This could be a urethral  diverticulum or fluid related to previous procedure.       Prolapse symptoms  Denies vaginal bulge, pressure sensation or protrusion.      GI symptoms  Lifelong chronic constipation. Mg Citrate helps. It is better now than it has been.     Sexual health/Pelvic floor  Not  sexually active. Some times pain with intercourse.   Has had laparascopic surgery for  ovarian cyst removal in 1994 and was incidentally diagnosed with endometriosis.     Relevant Medical History:    Diabetes? no  High Blood pressure? no     Recurrent UTIs? no  Sleep Apnea? no  Obesity? Body mass index is 21.62 kg/m .  History of Blood clots? no  Other medical problems: as above    Surgical History:      Past Surgical History:   Procedure Laterality Date    ABDOMEN SURGERY      laparoscopy check ovaries    AS REDUCTION OF LARGE BREAST      DENTAL SURGERY      DILATION AND CURETTAGE      miscarriage    MAMMOPLASTY REDUCTION         OB/Gyn History:  OB History   No obstetric history on file.        Medications/Vitamins/Supplements:   Current Outpatient Medications   Medication    ADDERALL XR 10 MG 24 hr capsule    MAGNESIUM PO    multivitamin, therapeutic with minerals (THERA-VIT-M) TABS tablet    nirmatrelvir and ritonavir (PAXLOVID) 300 mg/100 mg therapy pack    traZODone (DESYREL) 50 MG tablet     No current facility-administered medications for this visit.         Medical History:      Past Medical History:   Diagnosis Date    Depressive disorder     Fibromyalgia      ROS  Social History    Social History     Socioeconomic History    Marital status:      Spouse name: Not on file    Number of children: Not on file    Years of education: Not on file    Highest education level: Not on file   Occupational History    Not on file   Tobacco Use    Smoking status: Never    Smokeless tobacco: Never   Vaping Use    Vaping Use: Never used   Substance and Sexual Activity    Alcohol use: Yes     Alcohol/week: 2.0 standard drinks of alcohol     Types: 1 Glasses of wine, 1 Shots of liquor per week     Comment: occasional wine or vodka and soda    Drug use: No    Sexual activity: Not on file   Other Topics Concern    Parent/sibling w/ CABG, MI or angioplasty before 65F 55M? Not Asked   Social History Narrative    Not on file     Social Determinants of Health     Financial Resource Strain: Not on file   Food Insecurity: Not on file   Transportation Needs: Not on file   Physical Activity: Not on file   Stress: Not on file   Social Connections: Not on file   Interpersonal Safety: Not on file   Housing Stability: Not on file       Family History  Family History   Problem Relation Age of Onset    Pulmonary fibrosis Mother 65    Chronic Obstructive Pulmonary Disease Father     Heart Disease Father         heart monitor, arrhythmia?    Anxiety Disorder Sister     Psoriasis Sister     Hypothyroidism Sister     Heart Disease Paternal Grandfather     Idiopathic pulmonary fibrosis Mother     Hyperthyroidism Mother      Hyperthyroidism Sister     Hypothyroidism Maternal Grandmother     Parkinsonism Maternal Grandfather        Allergy    Allergies   Allergen Reactions    Tramadol Other (See Comments)     serotonin syndrome       Current Outpatient Medications   Medication    ADDERALL XR 10 MG 24 hr capsule    MAGNESIUM PO    multivitamin, therapeutic with minerals (THERA-VIT-M) TABS tablet    nirmatrelvir and ritonavir (PAXLOVID) 300 mg/100 mg therapy pack    traZODone (DESYREL) 50 MG tablet     No current facility-administered medications for this visit.       Objective:   Deferred ( virtual visit)     Asssessment and plan  Doris was seen today for video visit.    Diagnoses and all orders for this visit:    Dysuria  -     Adult Uro/Gyn  Referral    Urethral diverticulum  -     MR Pelvis (GYN) wo & w Contrast; Future      Mostly pain after urination , on and off. None currently. Recent UA a couple of weeks ago was normal. So was CT abdpelvis except for possible urethral diverticulum    Will obtain pelvic MRI to better assess suspected urethral diverticulum    Based on hx, suspect some pelvic floor muscle dysfunction as well. Will bring her in for pelvic exam and possible referral to pelvic PT may be warranted     Discussed cutting down bladder irritants and large fluid intake some as well               I spent a total of 40 minutes on  Video with  Doris Fritz on the date of the encounter in chart review, patient visit, review of tests, documentation and/or discussion with other providers about the issues documented above.       Sincerely,    Nancy Moreno MD

## 2023-10-17 NOTE — PROGRESS NOTES
Virtual Visit Details    Type of service:  Video Visit   Video Start Time: 2:05 PM  Video End Time:2:39 PM    Originating Location (pt. Location): Home    Distant Location (provider location):  On-site  Platform used for Video Visit: Rosario      Chief complaint: pain after urination on and off    Subjective     Doris Fritz is a 53 year old  G0 with hypothyroidism, Ulcerative colitis ( in remission), fibromyalgia, SIBO, endometriosis, Anxiety and depression who presents for a video visit today for pain after urination . She was recently evaluated in the ED for left flank pain and was found to have normal UA and  CT abd/Pelvis that showed normal upper urinary tract and possible urethral diverticulum ( Slightly smaller cystic structure encircling the  left lateral posterior aspect of the urethra measuring 2.4 x 1.3 cm, previously 3.1 x 1.7 cm in 2018)     Today she says that she was getting some symptoms like UTI ( pain with urination) starting in Nov 2022. Her work up was negative but she was given antibiotics. Since then every other week or so, she will feel a sharp pain after urination and sometimes it will last 1-5 minutes. This past summer, it was happening multiple times a week and will last for about 10 minutes. Currently not having this pain. Azo did not help. It often resolves after one void. There seems to be correlation with how much water she drinks. Worse if she doesn't drink enough water. She normally drinks 4-5 (32 oz bottles). Voids every hour during the day and 2-4 times at night. Says she is thirsty which is why she drinks more ( takes Adderall). Also drinks sparkling water and 2 cups of coffee. No gross hematuria or recurrent UTIs. No incontinence. She is unsure if she is emptying her bladder completely. She has to go badly but  not much comes out. She has had a shy bladder since she has been a child. She is a very tense person per her description and says she has been working on her mind-body  connection. Has worked with pelvic floor PT and says it may have helped her. 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     CT ABDOMEN AND PELVIS WITH CONTRAST 10/4/2023                                                                      IMPRESSION:   1.  No acute abnormality identified.  2.  Stable lobulated soft tissue lesion at the left pelvis that is  suggestive for an exophytic fibroid compared to 9/18/2018.  3.  Stable cystic structure encircling the left lateral posterior  urethra as compared to 9/18/2018. This could be a urethral  diverticulum or fluid related to previous procedure.       Prolapse symptoms  Denies vaginal bulge, pressure sensation or protrusion.      GI symptoms  Lifelong chronic constipation. Mg Citrate helps. It is better now than it has been.     Sexual health/Pelvic floor  Not  sexually active. Some times pain with intercourse.   Has had laparascopic surgery for  ovarian cyst removal in 1994 and was incidentally diagnosed with endometriosis.     Relevant Medical History:    Diabetes? no  High Blood pressure? no     Recurrent UTIs? no  Sleep Apnea? no  Obesity? Body mass index is 21.62 kg/m .  History of Blood clots? no  Other medical problems: as above    Surgical History:      Past Surgical History:   Procedure Laterality Date    ABDOMEN SURGERY      laparoscopy check ovaries    AS REDUCTION OF LARGE BREAST      DENTAL SURGERY      DILATION AND CURETTAGE      miscarriage    MAMMOPLASTY REDUCTION         OB/Gyn History:  OB History   No obstetric history on file.       Medications/Vitamins/Supplements:   Current Outpatient Medications   Medication    ADDERALL XR 10 MG 24 hr capsule    MAGNESIUM PO    multivitamin, therapeutic with minerals (THERA-VIT-M) TABS tablet    nirmatrelvir and ritonavir (PAXLOVID) 300 mg/100 mg therapy pack    traZODone (DESYREL) 50 MG tablet     No  current facility-administered medications for this visit.         Medical History:      Past Medical History:   Diagnosis Date    Depressive disorder     Fibromyalgia      ROS  Social History    Social History     Socioeconomic History    Marital status:      Spouse name: Not on file    Number of children: Not on file    Years of education: Not on file    Highest education level: Not on file   Occupational History    Not on file   Tobacco Use    Smoking status: Never    Smokeless tobacco: Never   Vaping Use    Vaping Use: Never used   Substance and Sexual Activity    Alcohol use: Yes     Alcohol/week: 2.0 standard drinks of alcohol     Types: 1 Glasses of wine, 1 Shots of liquor per week     Comment: occasional wine or vodka and soda    Drug use: No    Sexual activity: Not on file   Other Topics Concern    Parent/sibling w/ CABG, MI or angioplasty before 65F 55M? Not Asked   Social History Narrative    Not on file     Social Determinants of Health     Financial Resource Strain: Not on file   Food Insecurity: Not on file   Transportation Needs: Not on file   Physical Activity: Not on file   Stress: Not on file   Social Connections: Not on file   Interpersonal Safety: Not on file   Housing Stability: Not on file       Family History  Family History   Problem Relation Age of Onset    Pulmonary fibrosis Mother 65    Chronic Obstructive Pulmonary Disease Father     Heart Disease Father         heart monitor, arrhythmia?    Anxiety Disorder Sister     Psoriasis Sister     Hypothyroidism Sister     Heart Disease Paternal Grandfather     Idiopathic pulmonary fibrosis Mother     Hyperthyroidism Mother     Hyperthyroidism Sister     Hypothyroidism Maternal Grandmother     Parkinsonism Maternal Grandfather        Allergy    Allergies   Allergen Reactions    Tramadol Other (See Comments)     serotonin syndrome       Current Outpatient Medications   Medication    ADDERALL XR 10 MG 24 hr capsule    MAGNESIUM PO     multivitamin, therapeutic with minerals (THERA-VIT-M) TABS tablet    nirmatrelvir and ritonavir (PAXLOVID) 300 mg/100 mg therapy pack    traZODone (DESYREL) 50 MG tablet     No current facility-administered medications for this visit.       Objective:   Deferred ( virtual visit)     Asssessment and plan  Doris was seen today for video visit.    Diagnoses and all orders for this visit:    Dysuria  -     Adult Uro/Gyn  Referral    Urethral diverticulum  -     MR Pelvis (GYN) wo & w Contrast; Future      Mostly pain after urination , on and off. None currently. Recent UA a couple of weeks ago was normal. So was CT abdpelvis except for possible urethral diverticulum    Will obtain pelvic MRI to better assess suspected urethral diverticulum    Based on hx, suspect some pelvic floor muscle dysfunction as well. Will bring her in for pelvic exam and possible referral to pelvic PT may be warranted     Discussed cutting down bladder irritants and large fluid intake some as well               I spent a total of 40 minutes on  Video with  Doris PHUONG Fritz on the date of the encounter in chart review, patient visit, review of tests, documentation and/or discussion with other providers about the issues documented above.

## 2023-10-17 NOTE — NURSING NOTE
Is the patient currently in the state of [place]? YES    Visit mode:VIDEO    If the visit is dropped, the patient can be reconnected by: VIDEO VISIT: Send to e-mail at: [email or web address]    Will anyone else be joining the visit? NO  (If patient encounters technical issues they should call [phone]    How would you like to obtain your AVS? MyChart    Are changes needed to the allergy or medication list? No    Reason for visit: Video Visit (New apt )    Mine GARCIA

## 2023-11-13 ENCOUNTER — ANCILLARY PROCEDURE (OUTPATIENT)
Dept: MRI IMAGING | Facility: CLINIC | Age: 53
End: 2023-11-13
Attending: OBSTETRICS & GYNECOLOGY
Payer: COMMERCIAL

## 2023-11-13 DIAGNOSIS — N36.1 URETHRAL DIVERTICULUM: ICD-10-CM

## 2023-11-13 PROCEDURE — 72197 MRI PELVIS W/O & W/DYE: CPT

## 2023-11-13 PROCEDURE — 255N000002 HC RX 255 OP 636: Mod: JZ | Performed by: OBSTETRICS & GYNECOLOGY

## 2023-11-13 PROCEDURE — A9585 GADOBUTROL INJECTION: HCPCS | Mod: JZ | Performed by: OBSTETRICS & GYNECOLOGY

## 2023-11-13 RX ORDER — GADOBUTROL 604.72 MG/ML
7 INJECTION INTRAVENOUS ONCE
Status: COMPLETED | OUTPATIENT
Start: 2023-11-13 | End: 2023-11-13

## 2023-11-13 RX ADMIN — GADOBUTROL 7 ML: 604.72 INJECTION INTRAVENOUS at 12:52

## 2023-11-15 NOTE — PROGRESS NOTES
Assessment & Plan   Problem List Items Addressed This Visit    None  Visit Diagnoses       Neck pain    -  Primary    Relevant Orders    Physical Therapy Referral    Orthopedic  Referral    Bilateral shoulder pain, unspecified chronicity        Relevant Orders    Physical Therapy Referral    Orthopedic  Referral            PT ordered. Also referral to sports medicine for consideration of possible steroid injection. Could consider further rheumatic testing such as HLA-B27 testing as indicated. Encouraging Doris to continue working towards better stress management as well    25 minutes spent on the date of the encounter doing chart review, history and exam, documentation and further activities as noted.      MD CECILLE [facility]    Subjective   Doris is a 53 year old, presenting for the following health issues:  Shoulder Pain (Bilateral frozen shoulder/chronic pain/weakness over the past 3 years - discuss treatment options) and Nasal Problem (For the last few years, her nostrils get dry, bloody, and crusty, but then goes away in the summers - has tried nasal sprays, humidifier, Vaseline, castor oil, Net pot with little to no effects)      HPI   Shoulder pain/neck pain  - bilateral shoulders and neck  - stiff, decreased movement  - Doris has struggled with axial joint pain for years  - has tried PT in the past, personalized medicine, with limited benefit  - feels like symptoms have gotten worse over the past few months  - also acknowledges her stress level has gone through the roof 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      Objective    /83 (BP Location: Right arm, Patient  Position: Sitting, Cuff Size: Adult Regular)   Pulse 73   Temp 97.7  F (36.5  C) (Skin)   Wt 67.6 kg (149 lb)   LMP  (LMP Unknown)   SpO2 100%   BMI 20.78 kg/m    Body mass index is 20.78 kg/m .  Physical Exam   GENERAL: healthy, alert and no distress  NECK: no adenopathy, no asymmetry, masses, or scars and thyroid normal to palpation  RESP: lungs clear to auscultation - no rales, rhonchi or wheezes  CV: regular rate and rhythm, normal S1 S2, no S3 or S4, no murmur, click or rub, no peripheral edema and peripheral pulses strong  ABDOMEN: soft, nontender, no hepatosplenomegaly, no masses and bowel sounds normal  MS: no gross musculoskeletal defects noted, no edema

## 2023-11-16 ENCOUNTER — OFFICE VISIT (OUTPATIENT)
Dept: FAMILY MEDICINE | Facility: CLINIC | Age: 53
End: 2023-11-16
Payer: COMMERCIAL

## 2023-11-16 VITALS
WEIGHT: 149 LBS | TEMPERATURE: 97.7 F | SYSTOLIC BLOOD PRESSURE: 129 MMHG | BODY MASS INDEX: 20.78 KG/M2 | OXYGEN SATURATION: 100 % | DIASTOLIC BLOOD PRESSURE: 83 MMHG | HEART RATE: 73 BPM

## 2023-11-16 DIAGNOSIS — M25.511 BILATERAL SHOULDER PAIN, UNSPECIFIED CHRONICITY: ICD-10-CM

## 2023-11-16 DIAGNOSIS — M54.2 NECK PAIN: Primary | ICD-10-CM

## 2023-11-16 DIAGNOSIS — M25.512 BILATERAL SHOULDER PAIN, UNSPECIFIED CHRONICITY: ICD-10-CM

## 2023-11-16 NOTE — NURSING NOTE
Doris  53 year old    Chief Complaint   Patient presents with    Shoulder Pain     Bilateral frozen shoulder/chronic pain/weakness over the past 3 years - discuss treatment options    Nasal Problem     For the last few years, her nostrils get dry, bloody, and crusty, but then goes away in the summers - has tried nasal sprays, humidifier, Vaseline, castor oil, Net pot with little to no effects            Blood pressure 129/83, pulse 73, temperature 97.7  F (36.5  C), temperature source Skin, weight 67.6 kg (149 lb), SpO2 100%, not currently breastfeeding. Body mass index is 20.78 kg/m .    Patient Active Problem List   Diagnosis    Urethral diverticulum    Chronic constipation    Anxiety and depression    Chronic fatigue    Gastrointestinal food sensitivity    Fibromyalgia    Weight gain    Recurrent major depressive disorder (H24)    Major depressive disorder, recurrent episode, moderate (H)    Bulimia nervosa (H28)    Uncomplicated alcohol dependence (H)    Vitamin D deficiency    Ulcerative proctitis (H)    Ulcerative colitis (H)    Small intestinal bacterial overgrowth    Insomnia    Hypothyroidism    Endometriosis    Tension headache    Cervical pain    Attention deficit hyperactivity disorder (ADHD), predominantly inattentive type              Wt Readings from Last 2 Encounters:   11/16/23 67.6 kg (149 lb)   10/17/23 70.3 kg (155 lb)       BP Readings from Last 3 Encounters:   11/16/23 129/83   10/04/23 126/69   08/21/23 120/85                Current Outpatient Medications   Medication    ADDERALL XR 10 MG 24 hr capsule    MAGNESIUM PO    multivitamin, therapeutic with minerals (THERA-VIT-M) TABS tablet    traZODone (DESYREL) 50 MG tablet     No current facility-administered medications for this visit.              Social History     Tobacco Use    Smoking status: Never    Smokeless tobacco: Never   Vaping Use    Vaping Use: Never used   Substance Use Topics    Alcohol use: Yes     Alcohol/week: 2.0 standard  "drinks of alcohol     Types: 1 Glasses of wine, 1 Shots of liquor per week     Comment: occasional wine or vodka and soda    Drug use: No              Health Maintenance Due   Topic Date Due    ADVANCE CARE PLANNING  Never done    HEPATITIS B IMMUNIZATION (1 of 3 - 3-dose series) Never done    Pneumococcal Vaccine: Pediatrics (0 to 5 Years) and At-Risk Patients (6 to 64 Years) (1 - PCV) Never done    HIV SCREENING  Never done    HEPATITIS C SCREENING  Never done    MAMMO SCREENING  06/18/2019    COLORECTAL CANCER SCREENING  12/16/2020    HPV TEST  01/12/2021    PAP  01/12/2021    MEDICARE ANNUAL WELLNESS VISIT  11/01/2022    INFLUENZA VACCINE (1) 09/01/2023    COVID-19 Vaccine (6 - 2023-24 season) 09/01/2023    DEPRESSION 6 MO INDEX REPEAT PHQ-9  11/04/2023            No results found for: \"PAP\"           November 16, 2023 3:37 PM    "

## 2023-11-22 ENCOUNTER — TELEPHONE (OUTPATIENT)
Dept: UROLOGY | Facility: CLINIC | Age: 53
End: 2023-11-22
Payer: COMMERCIAL

## 2023-12-12 ENCOUNTER — PRE VISIT (OUTPATIENT)
Dept: UROLOGY | Facility: CLINIC | Age: 53
End: 2023-12-12
Payer: COMMERCIAL

## 2023-12-12 NOTE — TELEPHONE ENCOUNTER
Reason for Visit: Cystoscopy    Diagnosis: Dysuria, Urethral diverticulum     Records/imaging/labs/orders: 11/13/23 MR Pelvis (GYN) w/o & w/ Contrast    Rooming Requirements:  UA dip prior to getting ready for cystoscopy. If positive for Leuks and/or Nitrites, will not do cystoscopy. If positive, send urine for official UA / UC.    Kimberlyn Hernandez MA  12/12/23  2:33 PM

## 2023-12-19 ENCOUNTER — OFFICE VISIT (OUTPATIENT)
Dept: UROLOGY | Facility: CLINIC | Age: 53
End: 2023-12-19
Attending: OBSTETRICS & GYNECOLOGY
Payer: COMMERCIAL

## 2023-12-19 VITALS
HEIGHT: 71 IN | SYSTOLIC BLOOD PRESSURE: 140 MMHG | DIASTOLIC BLOOD PRESSURE: 97 MMHG | BODY MASS INDEX: 20.51 KG/M2 | HEART RATE: 90 BPM | WEIGHT: 146.5 LBS

## 2023-12-19 DIAGNOSIS — N36.1 URETHRAL DIVERTICULUM: ICD-10-CM

## 2023-12-19 DIAGNOSIS — R39.15 URINARY URGENCY: ICD-10-CM

## 2023-12-19 DIAGNOSIS — R35.1 NOCTURIA: Primary | ICD-10-CM

## 2023-12-19 DIAGNOSIS — R10.2 PELVIC PAIN IN FEMALE: ICD-10-CM

## 2023-12-19 DIAGNOSIS — N95.2 VAGINAL ATROPHY: ICD-10-CM

## 2023-12-19 PROCEDURE — 99214 OFFICE O/P EST MOD 30 MIN: CPT | Performed by: OBSTETRICS & GYNECOLOGY

## 2023-12-19 PROCEDURE — G0463 HOSPITAL OUTPT CLINIC VISIT: HCPCS | Performed by: OBSTETRICS & GYNECOLOGY

## 2023-12-19 RX ORDER — ESTRADIOL 0.1 MG/G
1 CREAM VAGINAL
Qty: 42.5 G | Refills: 3 | Status: SHIPPED | OUTPATIENT
Start: 2023-12-20

## 2023-12-19 NOTE — NURSING NOTE
Chief Complaint   Patient presents with    Follow Up     MRI urethral diverticulum done on 11/13/23     Patient voided 100 ml. PVR was 35 ml.

## 2023-12-19 NOTE — LETTER
12/19/2023       RE: Doris Fritz  [address]  [facility] [address]     Dear Colleague,    Thank you for referring your patient, Doris Fritz, to the [facility] [place] at [facility]. Please see a copy of my visit note below.    December 19, 2023    Referring Provider: No referring provider defined for this encounter.    Primary Care Provider: Donn Ramirez    HPI:  Doris Fritz is a 53 year old who presents for in person evaluation of overactive bladder (urinary urgency, frequency) in the setting of suburethral cystic lesion seen on prior CT scan. I last saw her as a new patient virtually on 10.17.23. 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 Today she says that her urinary urgency and frequency has resolved and she doesn't have any dysuria or pain. She voids every 2-3 hours during the day and 1-2 times at night.  She says she has been working on stress/anxiety reduction since this causes a lot of tension on her body. She is very anxious about pelvic exam today and says this is triggering for her.        EXAM: MR PELVIS (GYN) W/O and W CONTRAST  LOCATION: [facility]  DATE: 11/13/2023     INDICATION:  Urethral diverticulum.  COMPARISON: CT scan from 10/04/2023.  TECHNIQUE: Routine MRI female pelvis protocol including T1 in/out phase, diffusion, thin section high resolution multiplane T2, and post contrast T1.  CONTRAST: 7.0ml Gadavist     FINDINGS:      2.8 x 1.3 cm cystic structure posterior and to the left of the urethra compatible with urethral diverticulum. Partially visualized uterus with a partially visualized probable fibroid.     ADDITIONAL FINDINGS: No adenopathy.                                                                      IMPRESSION:  1.  Partially encircling cystic lesion around the urethra, likely a urethral diverticulum.            This note was  copied and pasted from virtual visit with Dr Moreno on 10.17.23      Chief complaint: pain after urination on and off    Subjective     Doris Fritz is a 53 year old  G0 with hypothyroidism, Ulcerative colitis ( in remission), fibromyalgia, SIBO, endometriosis, Anxiety and depression who presents for a video visit today for pain after urination . She was recently evaluated in the ED for left flank pain and was found to have normal UA and  CT abd/Pelvis that showed normal upper urinary tract and possible urethral diverticulum ( Slightly smaller cystic structure encircling the  left lateral posterior aspect of the urethra measuring 2.4 x 1.3 cm, previously 3.1 x 1.7 cm in 2018)     Today she says that she was getting some symptoms like UTI ( pain with urination) starting in Nov 2022. Her work up was negative but she was given antibiotics. Since then every other week or so, she will feel a sharp pain after urination and sometimes it will last 1-5 minutes. This past summer, it was happening multiple times a week and will last for about 10 minutes. Currently not having this pain. Azo did not help. It often resolves after one void. There seems to be correlation with how much water she drinks. Worse if she doesn't drink enough water. She normally drinks 4-5 (32 oz bottles). Voids every hour during the day and 2-4 times at night. Says she is thirsty which is why she drinks more ( takes Adderall). Also drinks sparkling water and 2 cups of coffee. No gross hematuria or recurrent UTIs. No incontinence. She is unsure if she is emptying her bladder completely. She has to go badly but  not much comes out. She has had a shy bladder since she has been a child. She is a very tense person per her description and says she has been working on her mind-body connection. Has worked with pelvic floor PT and says it may have helped her. 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     CT ABDOMEN AND PELVIS WITH CONTRAST 10/4/2023                                                                      IMPRESSION:   1.  No acute abnormality identified.  2.  Stable lobulated soft tissue lesion at the left pelvis that is  suggestive for an exophytic fibroid compared to 9/18/2018.  3.  Stable cystic structure encircling the left lateral posterior  urethra as compared to 9/18/2018. This could be a urethral  diverticulum or fluid related to previous procedure.       Prolapse symptoms  Denies vaginal bulge, pressure sensation or protrusion.      GI symptoms  Lifelong chronic constipation. Mg Citrate helps. It is better now than it has been.     Sexual health/Pelvic floor  Not  sexually active. Some times pain with intercourse.   Has had laparascopic surgery for  ovarian cyst removal in 1994 and was incidentally diagnosed with endometriosis.     Relevant Medical History:    Diabetes? no  High Blood pressure? no     Recurrent UTIs? no  Sleep Apnea? no  Obesity? Body mass index is 21.62 kg/m .  History of Blood clots? no  Other medical problems: as above               Relevant Medical History:    Diabetes? no  High Blood pressure? no     Recurrent UTIs? no  Sleep Apnea? no  Obesity? Body mass index is 20.43 kg/m .  History of Blood clots? no  Other medical problems: none    Surgical History:      Past Surgical History:   Procedure Laterality Date    ABDOMEN SURGERY      laparoscopy check ovaries    AS REDUCTION OF LARGE BREAST      DENTAL SURGERY      DILATION AND CURETTAGE      miscarriage    MAMMOPLASTY REDUCTION         OB/Gyn History:  OB History   No obstetric history on file.       Medications/Vitamins/Supplements:   Current Outpatient Medications   Medication    ADDERALL XR 10 MG 24 hr capsule    [START ON 12/20/2023] estradiol (ESTRACE) 0.1 MG/GM vaginal cream    MAGNESIUM PO    multivitamin, therapeutic with minerals (THERA-VIT-M) TABS tablet     traZODone (DESYREL) 50 MG tablet     No current facility-administered medications for this visit.         Medical History:      Past Medical History:   Diagnosis Date    Depressive disorder     Fibromyalgia      ROS  Social History    Social History     Socioeconomic History    Marital status:      Spouse name: Not on file    Number of children: Not on file    Years of education: Not on file    Highest education level: Not on file   Occupational History    Not on file   Tobacco Use    Smoking status: Never    Smokeless tobacco: Never   Vaping Use    Vaping Use: Never used   Substance and Sexual Activity    Alcohol use: Yes     Alcohol/week: 2.0 standard drinks of alcohol     Types: 1 Glasses of wine, 1 Shots of liquor per week     Comment: occasional wine or vodka and soda    Drug use: No    Sexual activity: Not on file   Other Topics Concern    Parent/sibling w/ CABG, MI or angioplasty before 65F 55M? Not Asked   Social History Narrative    Not on file     Social Determinants of Health     Financial Resource Strain: Not on file   Food Insecurity: Not on file   Transportation Needs: Not on file   Physical Activity: Not on file   Stress: Not on file   Social Connections: Not on file   Interpersonal Safety: Low Risk  (11/16/2023)    Interpersonal Safety     Do you feel physically and emotionally safe where you currently live?: Yes     Within the past 12 months, have you been hit, slapped, kicked or otherwise physically hurt by someone?: No     Within the past 12 months, have you been humiliated or emotionally abused in other ways by your partner or ex-partner?: No   Housing Stability: Not on file       Family History  Family History   Problem Relation Age of Onset    Pulmonary fibrosis Mother 65    Chronic Obstructive Pulmonary Disease Father     Heart Disease Father         heart monitor, arrhythmia?    Anxiety Disorder Sister     Psoriasis Sister     Hypothyroidism Sister     Heart Disease Paternal  "Grandfather     Idiopathic pulmonary fibrosis Mother     Hyperthyroidism Mother     Hyperthyroidism Sister     Hypothyroidism Maternal Grandmother     Parkinsonism Maternal Grandfather        Allergy    Allergies   Allergen Reactions    Tramadol Other (See Comments)     serotonin syndrome       Current Outpatient Medications   Medication    ADDERALL XR 10 MG 24 hr capsule    MAGNESIUM PO    multivitamin, therapeutic with minerals (THERA-VIT-M) TABS tablet    traZODone (DESYREL) 50 MG tablet     No current facility-administered medications for this visit.       Physical Exam:     BP (!) 140/97   Pulse 90   Ht 1.803 m (5' 11\")   Wt 66.5 kg (146 lb 8 oz)   LMP  (LMP Unknown)   BMI 20.43 kg/m   No LMP recorded (lmp unknown). Patient is postmenopausal. Body mass index is 20.43 kg/m .    Gen:  is alert, anxious (lots of reassurance given today)  Abdomen: Abdomen is soft, non-tender, non-distended,   Lungs: non-labored breathing.     Pelvic Exam:   Normal external female genitalia. The urethra was Normal.    Vagina: Tight levators but able to relax with digital biofeedback, some tenderness, no abnormal discharge, mod atrophy. No visible cystic lesion under the urethra but some general fullness. No tenderness or discharge from urethra on palpation  Uterus: Normal size, non tender   Ovaries: No palpable mass   Vulva: No lesions, no pain   Rectal: Deferred     Pelvic floor strength: 3/5 kegels.    Pelvic floor muscles: Pubococcygeus muscle tenderness bilaterally. Elevated muscle tone but able to relax with biofeedback    POPQ EXAM FOR PROLAPSE SEVERITY  No prolapse    Voiding trial:    VOID 100 ml  PVR 35 mL by Bladder ultrasound  Leak with Cough stress test : No,    Labs:   Color Urine (no units)   Date Value   10/04/2023 Light Yellow     Appearance Urine (no units)   Date Value   10/04/2023 Clear     Glucose Urine (mg/dL)   Date Value   10/04/2023 Negative     Bilirubin Urine (no units)   Date Value   10/04/2023 " Negative     Ketones Urine (mg/dL)   Date Value   10/04/2023 Negative     Specific Gravity Urine (no units)   Date Value   10/04/2023 1.024     pH Urine (no units)   Date Value   10/04/2023 7.0     Protein Albumin Urine (mg/dL)   Date Value   10/04/2023 Negative     Urobilinogen Urine (E.U./dL)   Date Value   08/21/2023 0.2     Nitrite Urine (no units)   Date Value   10/04/2023 Negative     Leukocyte Esterase Urine (no units)   Date Value   10/04/2023 Negative     CBC RESULTS:   Recent Labs   Lab Test 10/04/23  0912   WBC 9.5   RBC 4.61   HGB 14.0   HCT 42.0   MCV 91   MCH 30.4   MCHC 33.3   RDW 11.8            A/P: Doris Fritz is a 53 year old F with     Doris was seen today for follow up.    Diagnoses and all orders for this visit:    Nocturia  -     Physical Therapy Referral; Future    Urinary urgency  -     Physical Therapy Referral; Future    Pelvic pain in female  -     Physical Therapy Referral; Future    Urethral diverticulum    Vaginal atrophy  -     estradiol (ESTRACE) 0.1 MG/GM vaginal cream; Place 1 g vaginally three times a week          Currently not  symptomatic from her urethral diverticulum. Exam showed slight fulness but no visible cyst.   We discussed pros and cons of doing surgery for urethral diverticulum  At this point she prefers avoiding surgery if she is not too symptomatic which is reasonable. I did discuss with her the small chance of cancer associated with diverticulums.   Will refer her for pelvic PT for her levator myalgia. She can benefit from pelvic floor relaxation techniques    Follow up with me in one year for exam or sooner if she starts having more urinary urgency/frequency/recurrent UTIs or urethral or pelvic pain etc           I spent a total of 30 minutes with  Doris Fritz  on the date of the encounter in chart review, face to face patient visit, review of tests, documentation and/or discussion with other providers about the issues documented above.      Nancy Moreno MD, [facility]  , Department of OBGYN  Female Pelvic Medicine and Reconstructive Surgery ( Urogynecology)  CC  Patient Care Team:  Donn Ramirez MD as PCP - General (Family Medicine)  Paco Brand MD as MD (Urology)  Johanne Red, RN as Registered Nurse (Urology)  Fernando Landeros MD as MD (Family Medicine - Sports Medicine)  Terra Araujo MD as MD (INTERNAL MEDICINE - ENDOCRINOLOGY, DIABETES & METABOLISM)  Donn Ramirez MD as Assigned PCP  Nancy Moreno MD as MD (OB/Gyn)  Nancy Moreno MD as Assigned OBGYN Provider

## 2023-12-19 NOTE — PATIENT INSTRUCTIONS
Thank you for trusting us with your care!     If you need to contact us for questions about:  Symptoms, Scheduling & Medical Questions; Non-urgent (2-3 day response) Trevon message, Urgent (needing response today) [phone] (if after 3:30pm next day response)   Prescriptions: Please call your Pharmacy   Billing: [place] [phone] or [facility]:[phone]

## 2023-12-19 NOTE — PROGRESS NOTES
December 19, 2023    Referring Provider: No referring provider defined for this encounter.    Primary Care Provider: Donn Ramirez    HPI:  Doris Fritz is a 53 year old who presents for in person evaluation of overactive bladder (urinary urgency, frequency) in the setting of suburethral cystic lesion seen on prior CT scan. I last saw her as a new patient virtually on 10.17.23. 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 Today she says that her urinary urgency and frequency has resolved and she doesn't have any dysuria or pain. She voids every 2-3 hours during the day and 1-2 times at night.  She says she has been working on stress/anxiety reduction since this causes a lot of tension on her body. She is very anxious about pelvic exam today and says this is triggering for her.        EXAM: MR PELVIS (GYN) W/O and W CONTRAST  LOCATION: [facility]  DATE: 11/13/2023     INDICATION:  Urethral diverticulum.  COMPARISON: CT scan from 10/04/2023.  TECHNIQUE: Routine MRI female pelvis protocol including T1 in/out phase, diffusion, thin section high resolution multiplane T2, and post contrast T1.  CONTRAST: 7.0ml Gadavist     FINDINGS:      2.8 x 1.3 cm cystic structure posterior and to the left of the urethra compatible with urethral diverticulum. Partially visualized uterus with a partially visualized probable fibroid.     ADDITIONAL FINDINGS: No adenopathy.                                                                      IMPRESSION:  1.  Partially encircling cystic lesion around the urethra, likely a urethral diverticulum.            This note was copied and pasted from virtual visit with Dr Moreno on 10.17.23      Chief complaint: pain after urination on and off    Subjective     Doris Fritz is a 53 year old  G0 with hypothyroidism, Ulcerative colitis ( in remission), fibromyalgia, SIBO, endometriosis, Anxiety and depression who presents for a video visit  today for pain after urination . She was recently evaluated in the ED for left flank pain and was found to have normal UA and  CT abd/Pelvis that showed normal upper urinary tract and possible urethral diverticulum ( Slightly smaller cystic structure encircling the  left lateral posterior aspect of the urethra measuring 2.4 x 1.3 cm, previously 3.1 x 1.7 cm in 2018)     Today she says that she was getting some symptoms like UTI ( pain with urination) starting in Nov 2022. Her work up was negative but she was given antibiotics. Since then every other week or so, she will feel a sharp pain after urination and sometimes it will last 1-5 minutes. This past summer, it was happening multiple times a week and will last for about 10 minutes. Currently not having this pain. Azo did not help. It often resolves after one void. There seems to be correlation with how much water she drinks. Worse if she doesn't drink enough water. She normally drinks 4-5 (32 oz bottles). Voids every hour during the day and 2-4 times at night. Says she is thirsty which is why she drinks more ( takes Adderall). Also drinks sparkling water and 2 cups of coffee. No gross hematuria or recurrent UTIs. No incontinence. She is unsure if she is emptying her bladder completely. She has to go badly but  not much comes out. She has had a shy bladder since she has been a child. She is a very tense person per her description and says she has been working on her mind-body connection. Has worked with pelvic floor PT and says it may have helped her. 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     CT ABDOMEN AND PELVIS WITH CONTRAST 10/4/2023                                                                      IMPRESSION:   1.  No acute abnormality identified.  2.  Stable lobulated soft tissue lesion at the left pelvis that is  suggestive for an exophytic  fibroid compared to 9/18/2018.  3.  Stable cystic structure encircling the left lateral posterior  urethra as compared to 9/18/2018. This could be a urethral  diverticulum or fluid related to previous procedure.       Prolapse symptoms  Denies vaginal bulge, pressure sensation or protrusion.      GI symptoms  Lifelong chronic constipation. Mg Citrate helps. It is better now than it has been.     Sexual health/Pelvic floor  Not  sexually active. Some times pain with intercourse.   Has had laparascopic surgery for  ovarian cyst removal in 1994 and was incidentally diagnosed with endometriosis.     Relevant Medical History:    Diabetes? no  High Blood pressure? no     Recurrent UTIs? no  Sleep Apnea? no  Obesity? Body mass index is 21.62 kg/m .  History of Blood clots? no  Other medical problems: as above               Relevant Medical History:    Diabetes? no  High Blood pressure? no     Recurrent UTIs? no  Sleep Apnea? no  Obesity? Body mass index is 20.43 kg/m .  History of Blood clots? no  Other medical problems: none    Surgical History:      Past Surgical History:   Procedure Laterality Date    ABDOMEN SURGERY      laparoscopy check ovaries    AS REDUCTION OF LARGE BREAST      DENTAL SURGERY      DILATION AND CURETTAGE      miscarriage    MAMMOPLASTY REDUCTION         OB/Gyn History:  OB History   No obstetric history on file.       Medications/Vitamins/Supplements:   Current Outpatient Medications   Medication    ADDERALL XR 10 MG 24 hr capsule    [START ON 12/20/2023] estradiol (ESTRACE) 0.1 MG/GM vaginal cream    MAGNESIUM PO    multivitamin, therapeutic with minerals (THERA-VIT-M) TABS tablet    traZODone (DESYREL) 50 MG tablet     No current facility-administered medications for this visit.         Medical History:      Past Medical History:   Diagnosis Date    Depressive disorder     Fibromyalgia      ROS  Social History    Social History     Socioeconomic History    Marital status:      Spouse name:  Not on file    Number of children: Not on file    Years of education: Not on file    Highest education level: Not on file   Occupational History    Not on file   Tobacco Use    Smoking status: Never    Smokeless tobacco: Never   Vaping Use    Vaping Use: Never used   Substance and Sexual Activity    Alcohol use: Yes     Alcohol/week: 2.0 standard drinks of alcohol     Types: 1 Glasses of wine, 1 Shots of liquor per week     Comment: occasional wine or vodka and soda    Drug use: No    Sexual activity: Not on file   Other Topics Concern    Parent/sibling w/ CABG, MI or angioplasty before 65F 55M? Not Asked   Social History Narrative    Not on file     Social Determinants of Health     Financial Resource Strain: Not on file   Food Insecurity: Not on file   Transportation Needs: Not on file   Physical Activity: Not on file   Stress: Not on file   Social Connections: Not on file   Interpersonal Safety: Low Risk  (11/16/2023)    Interpersonal Safety     Do you feel physically and emotionally safe where you currently live?: Yes     Within the past 12 months, have you been hit, slapped, kicked or otherwise physically hurt by someone?: No     Within the past 12 months, have you been humiliated or emotionally abused in other ways by your partner or ex-partner?: No   Housing Stability: Not on file       Family History  Family History   Problem Relation Age of Onset    Pulmonary fibrosis Mother 65    Chronic Obstructive Pulmonary Disease Father     Heart Disease Father         heart monitor, arrhythmia?    Anxiety Disorder Sister     Psoriasis Sister     Hypothyroidism Sister     Heart Disease Paternal Grandfather     Idiopathic pulmonary fibrosis Mother     Hyperthyroidism Mother     Hyperthyroidism Sister     Hypothyroidism Maternal Grandmother     Parkinsonism Maternal Grandfather        Allergy    Allergies   Allergen Reactions    Tramadol Other (See Comments)     serotonin syndrome       Current Outpatient Medications  "  Medication    ADDERALL XR 10 MG 24 hr capsule    MAGNESIUM PO    multivitamin, therapeutic with minerals (THERA-VIT-M) TABS tablet    traZODone (DESYREL) 50 MG tablet     No current facility-administered medications for this visit.       Physical Exam:     BP (!) 140/97   Pulse 90   Ht 1.803 m (5' 11\")   Wt 66.5 kg (146 lb 8 oz)   LMP  (LMP Unknown)   BMI 20.43 kg/m   No LMP recorded (lmp unknown). Patient is postmenopausal. Body mass index is 20.43 kg/m .    Gen:  is alert, anxious (lots of reassurance given today)  Abdomen: Abdomen is soft, non-tender, non-distended,   Lungs: non-labored breathing.     Pelvic Exam:   Normal external female genitalia. The urethra was Normal.    Vagina: Tight levators but able to relax with digital biofeedback, some tenderness, no abnormal discharge, mod atrophy. No visible cystic lesion under the urethra but some general fullness. No tenderness or discharge from urethra on palpation  Uterus: Normal size, non tender   Ovaries: No palpable mass   Vulva: No lesions, no pain   Rectal: Deferred     Pelvic floor strength: 3/5 kegels.    Pelvic floor muscles: Pubococcygeus muscle tenderness bilaterally. Elevated muscle tone but able to relax with biofeedback    POPQ EXAM FOR PROLAPSE SEVERITY  No prolapse    Voiding trial:    VOID 100 ml  PVR 35 mL by Bladder ultrasound  Leak with Cough stress test : No,    Labs:   Color Urine (no units)   Date Value   10/04/2023 Light Yellow     Appearance Urine (no units)   Date Value   10/04/2023 Clear     Glucose Urine (mg/dL)   Date Value   10/04/2023 Negative     Bilirubin Urine (no units)   Date Value   10/04/2023 Negative     Ketones Urine (mg/dL)   Date Value   10/04/2023 Negative     Specific Gravity Urine (no units)   Date Value   10/04/2023 1.024     pH Urine (no units)   Date Value   10/04/2023 7.0     Protein Albumin Urine (mg/dL)   Date Value   10/04/2023 Negative     Urobilinogen Urine (E.U./dL)   Date Value   08/21/2023 0.2 "     Nitrite Urine (no units)   Date Value   10/04/2023 Negative     Leukocyte Esterase Urine (no units)   Date Value   10/04/2023 Negative     CBC RESULTS:   Recent Labs   Lab Test 10/04/23  0912   WBC 9.5   RBC 4.61   HGB 14.0   HCT 42.0   MCV 91   MCH 30.4   MCHC 33.3   RDW 11.8            A/P: Doris Fritz is a 53 year old F with     Doris was seen today for follow up.    Diagnoses and all orders for this visit:    Nocturia  -     Physical Therapy Referral; Future    Urinary urgency  -     Physical Therapy Referral; Future    Pelvic pain in female  -     Physical Therapy Referral; Future    Urethral diverticulum    Vaginal atrophy  -     estradiol (ESTRACE) 0.1 MG/GM vaginal cream; Place 1 g vaginally three times a week          Currently not  symptomatic from her urethral diverticulum. Exam showed slight fulness but no visible cyst.   We discussed pros and cons of doing surgery for urethral diverticulum  At this point she prefers avoiding surgery if she is not too symptomatic which is reasonable. I did discuss with her the small chance of cancer associated with diverticulums.   Will refer her for pelvic PT for her levator myalgia. She can benefit from pelvic floor relaxation techniques    Follow up with me in one year for exam or sooner if she starts having more urinary urgency/frequency/recurrent UTIs or urethral or pelvic pain etc           I spent a total of 30 minutes with  Doris Fritz  on the date of the encounter in chart review, face to face patient visit, review of tests, documentation and/or discussion with other providers about the issues documented above.     Nancy Moreno MD, [facility]  , Department of OBGYN  Female Pelvic Medicine and Reconstructive Surgery ( Urogynecology)  CC  Patient Care Team:  Donn Ramirez MD as PCP - General (Family Medicine)  Paco Brand MD as MD (Urology)  Johanne Red, RN as Registered Nurse (Urology)  Fernando Landeros MD  as MD (Family Medicine - Sports Medicine)  Terra Araujo MD as MD (INTERNAL MEDICINE - ENDOCRINOLOGY, DIABETES & METABOLISM)  Donn Ramirez MD as Assigned PCP  Nancy Moreno MD as MD (OB/Gyn)  Nancy Moreno MD as Assigned OBGYN Provider

## 2024-01-08 ENCOUNTER — THERAPY VISIT (OUTPATIENT)
Dept: PHYSICAL THERAPY | Facility: CLINIC | Age: 54
End: 2024-01-08
Attending: OBSTETRICS & GYNECOLOGY
Payer: COMMERCIAL

## 2024-01-08 DIAGNOSIS — R10.2 PELVIC PAIN IN FEMALE: Primary | ICD-10-CM

## 2024-01-08 DIAGNOSIS — R39.15 URINARY URGENCY: ICD-10-CM

## 2024-01-08 DIAGNOSIS — R35.1 NOCTURIA: ICD-10-CM

## 2024-01-08 PROCEDURE — 97530 THERAPEUTIC ACTIVITIES: CPT | Mod: GP | Performed by: PHYSICAL THERAPIST

## 2024-01-08 PROCEDURE — 97161 PT EVAL LOW COMPLEX 20 MIN: CPT | Mod: GP | Performed by: PHYSICAL THERAPIST

## 2024-01-08 PROCEDURE — 97110 THERAPEUTIC EXERCISES: CPT | Mod: 59 | Performed by: PHYSICAL THERAPIST

## 2024-01-08 NOTE — PROGRESS NOTES
PHYSICAL THERAPY EVALUATION  Type of Visit: Evaluation    See electronic medical record for Abuse and Falls Screening details.    Subjective       Presenting condition or subjective complaint: pelvic floor tension, unsure  Date of onset: 12/19/23    Relevant medical history: Bladder or bowel problems; Menopause; Fibromyalgia; Pain at night or rest   Dates & types of surgery:      Prior diagnostic imaging/testing results: CT scan     Prior therapy history for the same diagnosis, illness or injury: No      Employment: Yes    Hobbies/Interests: yoga, meditation, creative projects    She is also going to be seeing another PT to address shoulder and neck pain.     Doris notes having discomfort with using tampons and discomfort with intercourse. Since menopause that has caused intercourse to be sharp and painful.     Patient goals for therapy: relax and not be tight and tense    Pain assessment: Pain present- her whole body feels like insanely tight muscles every. Since 1998 she was diagnosed with Fibromyalgia. It started with pain in her back and has moved throughout her whole body.  She has a therapist and works on somatic work.     Doris meditates every day, kundalini yoga, and breath work. She finds that at times she is not gentle with herself.  In her whole life she has not been very aware of how her body feels and that she overrides signals. She would like to acknowledge her signals that she is doing to much which might create pain.     Objective      PELVIC EVALUATION  ADDITIONAL HISTORY:  Sex assigned at birth: Female  Gender identity: Female    Pronouns: She/Her Hers      Bladder History:  Feels bladder filling: Yes  Triggers for feeling of inability to wait to go to the bathroom: Yes airplanes  How long can you wait to urinate: not long, feels uncomfortable  Gets up at night to urinate: Yes 2-3x  Can stop the flow of urine when urinating: Sometimes  Volume of urine usually released: Medium (to small)    Other issues: Straining to pass urine; Slow or hesitant urine stream; Trouble emptying bladder completely- reports having a shy bladder her whole life, she has noticed improvements with starting her stream of urine  Number of bladder infections in last 12 months:    Fluid intake per day: 64+ 8-10    Medications taken for bladder: No     Activities causing urine leak: Jump (ocassionally)    Amount of urine typically leaked: drops  Pads used to help with leaking: No      Frequency of voids during the day: about every hour    Bowel History:  Frequency of bowel movement: 1x a day (1x a day for 5 days a week)  Consistency of stool: Hard   bristol stool scale: type 3-4, sometimes type 4  Ignores the urge to defecate: No  Other bowel issues: Straining to have bowel movement  Length of time spent trying to have a bowel movement: minute    Sexual Function History:  Sexual orientation: Straight    Sexually active:    Lubrication used:      Pelvic pain: Initial penetration (rectal or vaginal); Deep penetration (rectal or vaginal); Pelvic exams    Pain or difficulty with orgasms/erection/ejaculation: Yes difficult due to discomfort  State of menopause: Post-menopause (I am done with menopause)  Hormone medications: Yes estrogen cream 3x a week, just started    Are you currently pregnant: No, Number of previous pregnancies: 1, Number of deliveries: 0, Have you been diagnosed with pelvic prolapse or abdominal separation: No, Do you get regular exercise: Yes, I do this type of exercise: walk, yoga, Have you tried pelvic floor strengthening exercises for 4 weeks: No, Do you have any history of trauma that is relevant to your care that you d like to share: No    Discussed reason for referral regarding pelvic health needs and external/internal pelvic floor muscle examination with patient/guardian.  Opportunity provided to ask questions and verbal consent for assessment and intervention was given.    POSTURE: Sitting Posture:  Lordosis decreased    BREATHING SYMMETRY:  chest breathing, symmetric    PELVIC EXAM  External Visual Inspection:  At rest: Normal  With voluntary pelvic floor contraction: Perineal elevation  Relaxation of PFM: Yes, Partial/delayed relaxation  With intra-abdominal pressure: Cough: Perineal descent    Integumentary:   Introitus: Unremarkable    External Digital Palpation per Perineum:   Ischiocavernosis: Unremarkable  Bulbo cavernosis: Unremarkable  Transverse perineal: Unremarkable  Levator ani: Unremarkable    Internal Digital Palpation:  Per Vagina:  Tenderness  Myofascial Resistance to Palpation: Firm  Digital Muscle Performance: P (Power): 3/5  E (Endurance): 8 seconds  F (Fast Twitch): 10/10  Compensations: Breath holding  Relaxation Post-Contraction: Normal    Assessment & Plan   CLINICAL IMPRESSIONS  Medical Diagnosis: pelvic pain    Treatment Diagnosis: pelvic floor dysfunction   Impression/Assessment: Patient is a 53 year old female with pelvic floor dysfunction complaints.  The following significant findings have been identified: Pain, Decreased ROM/flexibility, Decreased strength, Impaired muscle performance, and Decreased activity tolerance. These impairments interfere with their ability to perform self care tasks, recreational activities, household chores, driving , household mobility, and community mobility as compared to previous level of function.     Clinical Decision Making (Complexity):  Clinical Presentation: Stable/Uncomplicated  Clinical Presentation Rationale: based on medical and personal factors listed in PT evaluation  Clinical Decision Making (Complexity): Low complexity    PLAN OF CARE  Treatment Interventions:  Modalities: Biofeedback, Cryotherapy, Dry Needling, E-stim, Hot Pack  Interventions: Gait Training, Manual Therapy, Neuromuscular Re-education, Therapeutic Activity, Therapeutic Exercise, Self-Care/Home Management    Long Term Goals     PT Goal 1  Goal Description: Pt will have  no greater than 2/10 pain with intercourse.  Rationale: to maximize safety and independence with performance of ADLs and functional tasks  Target Date: 04/02/24      Frequency of Treatment: 1x a week  Duration of Treatment: 12 weeks    Education Assessment:   Learner/Method: Patient;No Barriers to Learning    Risks and benefits of evaluation/treatment have been explained.   Patient/Family/caregiver agrees with Plan of Care.     Evaluation Time:     PT Eval, Low Complexity Minutes (62392): 30       Signing Clinician: GETACHEW [facility]                                                                                   OUTPATIENT PHYSICAL THERAPY      PLAN OF TREATMENT FOR OUTPATIENT REHABILITATION   Patient's Last Name, First Name, CECILLEEDDI  FritzDoris egan YOB: 1970   Provider's Name   [facility]   Medical Record No.  [id number]     Onset Date: 12/19/23  Start of Care Date: 01/08/24     Medical Diagnosis:  pelvic pain      PT Treatment Diagnosis:  pelvic floor dysfunction Plan of Treatment  Frequency/Duration: 1x a week/ 12 weeks    Certification date from 01/09/24 to 04/02/24         See note for plan of treatment details and functional goals     Zainab Martinez, PT                         I CERTIFY THE NEED FOR THESE SERVICES FURNISHED UNDER        THIS PLAN OF TREATMENT AND WHILE UNDER MY CARE     (Physician attestation of this document indicates review and certification of the therapy plan).              Referring Provider:  Nancy Moreno    Initial Assessment  See Epic Evaluation- Start of Care Date: 01/08/24

## 2024-01-22 ENCOUNTER — THERAPY VISIT (OUTPATIENT)
Dept: PHYSICAL THERAPY | Facility: CLINIC | Age: 54
End: 2024-01-22
Attending: FAMILY MEDICINE
Payer: COMMERCIAL

## 2024-01-22 DIAGNOSIS — M54.2 NECK PAIN: ICD-10-CM

## 2024-01-22 DIAGNOSIS — M25.512 BILATERAL SHOULDER PAIN, UNSPECIFIED CHRONICITY: ICD-10-CM

## 2024-01-22 DIAGNOSIS — M25.511 BILATERAL SHOULDER PAIN, UNSPECIFIED CHRONICITY: ICD-10-CM

## 2024-01-22 PROCEDURE — 97110 THERAPEUTIC EXERCISES: CPT | Mod: GP

## 2024-01-22 PROCEDURE — 97161 PT EVAL LOW COMPLEX 20 MIN: CPT | Mod: GP

## 2024-01-24 ASSESSMENT — ANXIETY QUESTIONNAIRES
GAD7 TOTAL SCORE: 0
8. IF YOU CHECKED OFF ANY PROBLEMS, HOW DIFFICULT HAVE THESE MADE IT FOR YOU TO DO YOUR WORK, TAKE CARE OF THINGS AT HOME, OR GET ALONG WITH OTHER PEOPLE?: NOT DIFFICULT AT ALL
7. FEELING AFRAID AS IF SOMETHING AWFUL MIGHT HAPPEN: NOT AT ALL
6. BECOMING EASILY ANNOYED OR IRRITABLE: NOT AT ALL
IF YOU CHECKED OFF ANY PROBLEMS ON THIS QUESTIONNAIRE, HOW DIFFICULT HAVE THESE PROBLEMS MADE IT FOR YOU TO DO YOUR WORK, TAKE CARE OF THINGS AT HOME, OR GET ALONG WITH OTHER PEOPLE: NOT DIFFICULT AT ALL
3. WORRYING TOO MUCH ABOUT DIFFERENT THINGS: NOT AT ALL
7. FEELING AFRAID AS IF SOMETHING AWFUL MIGHT HAPPEN: NOT AT ALL
5. BEING SO RESTLESS THAT IT IS HARD TO SIT STILL: NOT AT ALL
2. NOT BEING ABLE TO STOP OR CONTROL WORRYING: NOT AT ALL
GAD7 TOTAL SCORE: 0
4. TROUBLE RELAXING: NOT AT ALL
1. FEELING NERVOUS, ANXIOUS, OR ON EDGE: NOT AT ALL

## 2024-01-25 ENCOUNTER — OFFICE VISIT (OUTPATIENT)
Dept: OBGYN | Facility: CLINIC | Age: 54
End: 2024-01-25
Attending: OBSTETRICS & GYNECOLOGY
Payer: COMMERCIAL

## 2024-01-25 VITALS
SYSTOLIC BLOOD PRESSURE: 133 MMHG | HEART RATE: 93 BPM | WEIGHT: 148.1 LBS | BODY MASS INDEX: 20.73 KG/M2 | DIASTOLIC BLOOD PRESSURE: 82 MMHG | HEIGHT: 71 IN

## 2024-01-25 DIAGNOSIS — Z01.419 ENCOUNTER FOR GYNECOLOGICAL EXAMINATION WITHOUT ABNORMAL FINDING: ICD-10-CM

## 2024-01-25 DIAGNOSIS — Z87.440 PERSONAL HISTORY OF URINARY TRACT INFECTION: ICD-10-CM

## 2024-01-25 DIAGNOSIS — Z12.4 SCREENING FOR MALIGNANT NEOPLASM OF CERVIX: ICD-10-CM

## 2024-01-25 DIAGNOSIS — Z00.00 VISIT FOR PREVENTIVE HEALTH EXAMINATION: Primary | ICD-10-CM

## 2024-01-25 DIAGNOSIS — N95.1 SYMPTOMATIC MENOPAUSAL OR FEMALE CLIMACTERIC STATES: ICD-10-CM

## 2024-01-25 DIAGNOSIS — Z86.59 HISTORY OF DEPRESSION: ICD-10-CM

## 2024-01-25 DIAGNOSIS — Z83.1: ICD-10-CM

## 2024-01-25 PROCEDURE — G0463 HOSPITAL OUTPT CLINIC VISIT: HCPCS | Mod: 25 | Performed by: ADVANCED PRACTICE MIDWIFE

## 2024-01-25 PROCEDURE — G0145 SCR C/V CYTO,THINLAYER,RESCR: HCPCS | Performed by: ADVANCED PRACTICE MIDWIFE

## 2024-01-25 PROCEDURE — 87624 HPV HI-RISK TYP POOLED RSLT: CPT | Performed by: ADVANCED PRACTICE MIDWIFE

## 2024-01-25 PROCEDURE — 99396 PREV VISIT EST AGE 40-64: CPT | Performed by: ADVANCED PRACTICE MIDWIFE

## 2024-01-25 RX ORDER — ESTRADIOL 0.03 MG/D
1 FILM, EXTENDED RELEASE TRANSDERMAL
Qty: 24 PATCH | Refills: 3 | Status: SHIPPED | OUTPATIENT
Start: 2024-01-25

## 2024-01-25 RX ORDER — PROGESTERONE 100 MG/1
100 CAPSULE ORAL DAILY
Qty: 90 CAPSULE | Refills: 3 | Status: SHIPPED | OUTPATIENT
Start: 2024-01-25

## 2024-01-25 ASSESSMENT — ANXIETY QUESTIONNAIRES
GAD7 TOTAL SCORE: 0
GAD7 TOTAL SCORE: 0
7. FEELING AFRAID AS IF SOMETHING AWFUL MIGHT HAPPEN: NOT AT ALL
6. BECOMING EASILY ANNOYED OR IRRITABLE: NOT AT ALL
1. FEELING NERVOUS, ANXIOUS, OR ON EDGE: NOT AT ALL
3. WORRYING TOO MUCH ABOUT DIFFERENT THINGS: NOT AT ALL
2. NOT BEING ABLE TO STOP OR CONTROL WORRYING: NOT AT ALL
5. BEING SO RESTLESS THAT IT IS HARD TO SIT STILL: NOT AT ALL

## 2024-01-25 ASSESSMENT — PATIENT HEALTH QUESTIONNAIRE - PHQ9
SUM OF ALL RESPONSES TO PHQ QUESTIONS 1-9: 2
5. POOR APPETITE OR OVEREATING: NOT AT ALL

## 2024-01-25 NOTE — LETTER
1/25/2024       RE: Doris Fritz  [address]  [facility] [address]     Dear Colleague,    Thank you for referring your patient, Doris Fritz, to the [facility] [place] at [facility]. Please see a copy of my visit note below.        Subjective:  Doris Fritz is an 53 year old, No obstetric history on file., who requests an evaluation of menopause symptoms and annual preventative health exam.       Concerns today include: preventative visit, catch up on care  Needs pap, mammo, colonoscopy    Eula/menopause symptoms include:   Vaginal dryness, Vaginal pain/dyspareunia, and Brain fog  Takes trazodone at night  Adderrall interferes w sleep some  Took hormones during perimenopause in the past w some success, but was struggling with other medical issues that is was hard to separate symptoms    Gynecologic History  Patient is postmenopausal x 6-7 yrs     Hx of inpatient, then day treatment, then outpatient treatment for alcohol abuse at [facility]-very happy with the care and her experience. Doing well w her sobriety    Last Pap smear: today  History of abnormal Pap smear: Yes: HPV      Obstetric History  OB History   Obstetric Comments   2 step sons        Health Maintenance  Colonoscopy due  Mammogram due    Labs:  TSH   Date Value Ref Range Status   11/01/2021 0.95 0.40 - 4.00 mU/L Final   05/27/2020 1.32 0.40 - 4.00 mU/L Final     Lab Results   Component Value Date    CHOL 170 11/01/2021    CHOL 172.0 05/27/2020     Lab Results   Component Value Date    HDL 57 11/01/2021    HDL 64.0 05/27/2020     Lab Results   Component Value Date     11/01/2021    LDL 97.0 05/27/2020    LDL 81 08/05/2019     Lab Results   Component Value Date    TRIG 52 11/01/2021    TRIG 54.0 05/27/2020     Lab Results   Component Value Date    CHOLHDLRATIO 2.7 05/27/2020     Last fasting glucose:    Past Medical History  Past Medical History:    Diagnosis Date    ADHD (attention deficit hyperactivity disorder) 07/05/2023    adderrall    Endometriosis     found during surgery, stable    Family history of infection due to human papilloma virus (HPV)     Fibromyalgia     State disability,chronic pain, tight muscles    History of depression     2023, stable, no meds, sobriety x 2 yrs    Hx of trauma     childhood, undiagnosised learning  disabilities    Personal history of urinary tract infection        Past Surgical History  Past Surgical History:   Procedure Laterality Date    ABDOMEN SURGERY      laparoscopy check ovaries    AS REDUCTION OF LARGE BREAST      DENTAL SURGERY      DILATION AND CURETTAGE      miscarriage    MAMMOPLASTY REDUCTION         Medications  Current Outpatient Medications   Medication    ADDERALL XR 10 MG 24 hr capsule    estradiol (ESTRACE) 0.1 MG/GM vaginal cream    estradiol (VIVELLE-DOT) 0.025 MG/24HR bi-weekly patch    MAGNESIUM PO    multivitamin, therapeutic with minerals (THERA-VIT-M) TABS tablet    progesterone (PROMETRIUM) 100 MG capsule    traZODone (DESYREL) 50 MG tablet     No current facility-administered medications for this visit.       Allergies     Allergies   Allergen Reactions    Tramadol Other (See Comments)     serotonin syndrome       Social History  Social History     Socioeconomic History    Marital status:      Spouse name: Not on file    Number of children: Not on file    Years of education: Not on file    Highest education level: Not on file   Occupational History    Occupation: [employer]     Comment: part time, self employeed.   Tobacco Use    Smoking status: Never    Smokeless tobacco: Never   Vaping Use    Vaping Use: Never used   Substance and Sexual Activity    Alcohol use: Not Currently     Alcohol/week: 2.0 standard drinks of alcohol     Types: 1 Glasses of wine, 1 Shots of liquor per week     Comment: sober since 2022    Drug use: No    Sexual activity: Not Currently     Comment:  " x 14 yrs   Other Topics Concern    Parent/sibling w/ CABG, MI or angioplasty before 65F 55M? Not Asked   Social History Narrative    Not on file     Social Determinants of Health     Financial Resource Strain: Not on file   Food Insecurity: Not on file   Transportation Needs: Not on file   Physical Activity: Not on file   Stress: Not on file   Social Connections: Not on file   Interpersonal Safety: Low Risk  (1/25/2024)    Interpersonal Safety     Do you feel physically and emotionally safe where you currently live?: Yes     Within the past 12 months, have you been hit, slapped, kicked or otherwise physically hurt by someone?: No     Within the past 12 months, have you been humiliated or emotionally abused in other ways by your partner or ex-partner?: No   Recent Concern: Interpersonal Safety - High Risk (1/25/2024)    Interpersonal Safety     Do you feel physically and emotionally safe where you currently live?: No     Within the past 12 months, have you been hit, slapped, kicked or otherwise physically hurt by someone?: No     Within the past 12 months, have you been humiliated or emotionally abused in other ways by your partner or ex-partner?: No   Housing Stability: Not on file       Family History  Family History   Problem Relation Age of Onset    Pulmonary fibrosis Mother 65    Idiopathic pulmonary fibrosis Mother     Hyperthyroidism Mother     Chronic Obstructive Pulmonary Disease Father     Heart Disease Father         heart monitor, arrhythmia?    Anxiety Disorder Sister     Psoriasis Sister     Graves' disease Sister     Hypothyroidism Maternal Grandmother     Parkinsonism Maternal Grandfather     Heart Disease Paternal Grandfather      No family history of breast, uterine, ovarian or colon cancer.      Objective  EXAM:  Blood pressure 133/82, pulse 93, height 1.803 m (5' 11\"), weight 67.2 kg (148 lb 1.6 oz), not currently breastfeeding. Body mass index is 20.66 kg/m .  General - pleasant female in " no acute distress.  Skin - no suspicious lesions or rashes  EENT-  PERRLA, euthyroid without palpable nodules  Neck - supple without lymphadenopathy.  Lungs - clear to auscultation bilaterally.  Heart - regular rate and rhythm without murmur.  Abdomen - soft, nontender, nondistended, no masses or organomegaly noted.  Musculoskeletal - no gross deformities.  Neurological - normal strength, sensation, and mental status.    Breast Exam:  Breast: Without visible skin changes. No dimpling or lesions seen.   Breasts supple, non-tender with palpation, no dominant mass, nodularity, or nipple discharge noted bilaterally. Axillary nodes negative.     Pelvic Exam:  EG/BUS: Estrogen loss atrophy present. Normal genital architecture without lesions, erythema or abnormal secretions Bartholin's, Urethra, [place]'s normal   Urethral meatus: normal   Urethra: no masses, tenderness, or scarring   Bladder: no masses or tenderness   Vagina: moist, pink, rugae with creamy, white, and odorless  secretions  Cervix: no lesions and pink, moist, closed, without lesion or CMT  Rectum:anus normal    ASSESSMENT/Plan:  Doris Fritz is an 53 year old, No obstetric history on file., who requests an evaluation of menopause symptoms and annual preventative health exam.    ICD-10-CM    1. Visit for preventive health examination  Z00.00 Colonoscopy Screening  Referral     estradiol (VIVELLE-DOT) 0.025 MG/24HR bi-weekly patch     progesterone (PROMETRIUM) 100 MG capsule     Hemoglobin A1c     Glucose     Lipid Profile     TSH with free T4 reflex      2. Family history of infection due to human papilloma virus (HPV)  Z83.1       3. History of depression  Z86.59       4. Personal history of urinary tract infection  Z87.440       5. Screening for malignant neoplasm of cervix  Z12.4 Pap Smear Exam [] Do Not Remove     Pelvic and Breast Exam Procedure []     Pap imaged thin layer screen with HPV - recommended age 30 - 65 years (select  HPV order below)     HPV Hold (Lab Only)     HPV High Risk Types DNA Cervical      6. Encounter for gynecological examination without abnormal finding  Z01.419 Pap Smear Exam [] Do Not Remove     Pelvic and Breast Exam Procedure []     Pap imaged thin layer screen with HPV - recommended age 30 - 65 years (select HPV order below)     HPV Hold (Lab Only)     HPV High Risk Types DNA Cervical      7. Symptomatic menopausal or female climacteric states  N95.1 estradiol (VIVELLE-DOT) 0.025 MG/24HR bi-weekly patch     progesterone (PROMETRIUM) 100 MG capsule          PLAN:  Orders Placed This Encounter   Procedures    Pelvic and Breast Exam Procedure []    Pap Smear Exam [] Do Not Remove    Hemoglobin A1c    Glucose    Lipid Profile    TSH with free T4 reflex    HPV High Risk Types DNA Cervical    Colonoscopy Screening  Referral    Pap imaged thin layer screen with HPV - recommended age 30 - 65 years (select HPV order below)    HPV Hold (Lab Only)       -Reviewed risk and benefits of initiating systemic HT for bothersome VMS. -Discussed risk versus benefit with patient in initiating systemic HT  Doris Fritz is an 53 year old with No LMP recorded (lmp unknown). Patient is postmenopausal..x 6 yrs  -Patient does not have any absolute contraindications and requests to initiate treatment today using transdermal estradiol patch including nightly oral progesterone. Discussed medication regimen with patient continuous progesterone.     -Acupuncture, yoga, exercise, chiropractic work, and paced respirations may help to cope with VMS but have not shown evidence to alleviate them.   -Discussed ways to decrease vulvar irritation including wearing 100% cotton underwear, avoiding soaps or scented products, pat-drying and utilizing preservative-free emollients  -Reviewed use of vaginal moisterizer for GSM.      Return to clinic in prn.  Follow-up as needed.  Kaci Hancock, APRN CNM        Answers  submitted by the patient for this visit:  TJ-7 (Submitted on 1/24/2024)  TJ 7 TOTAL SCORE: 0     Well-developed, well nourished

## 2024-01-25 NOTE — PATIENT INSTRUCTIONS

## 2024-01-25 NOTE — PROGRESS NOTES
Subjective:  Doris Fritz is an 53 year old, No obstetric history on file., who requests an evaluation of menopause symptoms and annual preventative health exam.       Concerns today include: preventative visit, catch up on care  Needs pap, mammo, colonoscopy    Eula/menopause symptoms include:   Vaginal dryness, Vaginal pain/dyspareunia, and Brain fog  Takes trazodone at night  Adderrall interferes w sleep some  Took hormones during perimenopause in the past w some success, but was struggling with other medical issues that is was hard to separate symptoms    Gynecologic History  Patient is postmenopausal x 6-7 yrs     Hx of inpatient, then day treatment, then outpatient treatment for alcohol abuse at [facility]-very happy with the care and her experience. Doing well w her sobriety    Last Pap smear: today  History of abnormal Pap smear: Yes: HPV      Obstetric History  OB History   Obstetric Comments   2 step sons        Health Maintenance  Colonoscopy due  Mammogram due    Labs:  TSH   Date Value Ref Range Status   11/01/2021 0.95 0.40 - 4.00 mU/L Final   05/27/2020 1.32 0.40 - 4.00 mU/L Final     Lab Results   Component Value Date    CHOL 170 11/01/2021    CHOL 172.0 05/27/2020     Lab Results   Component Value Date    HDL 57 11/01/2021    HDL 64.0 05/27/2020     Lab Results   Component Value Date     11/01/2021    LDL 97.0 05/27/2020    LDL 81 08/05/2019     Lab Results   Component Value Date    TRIG 52 11/01/2021    TRIG 54.0 05/27/2020     Lab Results   Component Value Date    CHOLHDLRATIO 2.7 05/27/2020     Last fasting glucose:    Past Medical History  Past Medical History:   Diagnosis Date    ADHD (attention deficit hyperactivity disorder) 07/05/2023    adderrall    Endometriosis     found during surgery, stable    Family history of infection due to human papilloma virus (HPV)     Fibromyalgia     State disability,chronic pain, tight muscles    History of depression     2023, stable, no meds,  sobriety x 2 yrs    Hx of trauma     childhood, undiagnosised learning  disabilities    Personal history of urinary tract infection        Past Surgical History  Past Surgical History:   Procedure Laterality Date    ABDOMEN SURGERY      laparoscopy check ovaries    AS REDUCTION OF LARGE BREAST      DENTAL SURGERY      DILATION AND CURETTAGE      miscarriage    MAMMOPLASTY REDUCTION         Medications  Current Outpatient Medications   Medication    ADDERALL XR 10 MG 24 hr capsule    estradiol (ESTRACE) 0.1 MG/GM vaginal cream    estradiol (VIVELLE-DOT) 0.025 MG/24HR bi-weekly patch    MAGNESIUM PO    multivitamin, therapeutic with minerals (THERA-VIT-M) TABS tablet    progesterone (PROMETRIUM) 100 MG capsule    traZODone (DESYREL) 50 MG tablet     No current facility-administered medications for this visit.       Allergies     Allergies   Allergen Reactions    Tramadol Other (See Comments)     serotonin syndrome       Social History  Social History     Socioeconomic History    Marital status:      Spouse name: Not on file    Number of children: Not on file    Years of education: Not on file    Highest education level: Not on file   Occupational History    Occupation: [employer]     Comment: part time, self employeed.   Tobacco Use    Smoking status: Never    Smokeless tobacco: Never   Vaping Use    Vaping Use: Never used   Substance and Sexual Activity    Alcohol use: Not Currently     Alcohol/week: 2.0 standard drinks of alcohol     Types: 1 Glasses of wine, 1 Shots of liquor per week     Comment: sober since 2022    Drug use: No    Sexual activity: Not Currently     Comment:  x 14 yrs   Other Topics Concern    Parent/sibling w/ CABG, MI or angioplasty before 65F 55M? Not Asked   Social History Narrative    Not on file     Social Determinants of Health     Financial Resource Strain: Not on file   Food Insecurity: Not on file   Transportation Needs: Not on file   Physical Activity: Not on file  "  Stress: Not on file   Social Connections: Not on file   Interpersonal Safety: Low Risk  (1/25/2024)    Interpersonal Safety     Do you feel physically and emotionally safe where you currently live?: Yes     Within the past 12 months, have you been hit, slapped, kicked or otherwise physically hurt by someone?: No     Within the past 12 months, have you been humiliated or emotionally abused in other ways by your partner or ex-partner?: No   Recent Concern: Interpersonal Safety - High Risk (1/25/2024)    Interpersonal Safety     Do you feel physically and emotionally safe where you currently live?: No     Within the past 12 months, have you been hit, slapped, kicked or otherwise physically hurt by someone?: No     Within the past 12 months, have you been humiliated or emotionally abused in other ways by your partner or ex-partner?: No   Housing Stability: Not on file       Family History  Family History   Problem Relation Age of Onset    Pulmonary fibrosis Mother 65    Idiopathic pulmonary fibrosis Mother     Hyperthyroidism Mother     Chronic Obstructive Pulmonary Disease Father     Heart Disease Father         heart monitor, arrhythmia?    Anxiety Disorder Sister     Psoriasis Sister     Graves' disease Sister     Hypothyroidism Maternal Grandmother     Parkinsonism Maternal Grandfather     Heart Disease Paternal Grandfather      No family history of breast, uterine, ovarian or colon cancer.      Objective  EXAM:  Blood pressure 133/82, pulse 93, height 1.803 m (5' 11\"), weight 67.2 kg (148 lb 1.6 oz), not currently breastfeeding. Body mass index is 20.66 kg/m .  General - pleasant female in no acute distress.  Skin - no suspicious lesions or rashes  EENT-  PERRLA, euthyroid without palpable nodules  Neck - supple without lymphadenopathy.  Lungs - clear to auscultation bilaterally.  Heart - regular rate and rhythm without murmur.  Abdomen - soft, nontender, nondistended, no masses or organomegaly " noted.  Musculoskeletal - no gross deformities.  Neurological - normal strength, sensation, and mental status.    Breast Exam:  Breast: Without visible skin changes. No dimpling or lesions seen.   Breasts supple, non-tender with palpation, no dominant mass, nodularity, or nipple discharge noted bilaterally. Axillary nodes negative.     Pelvic Exam:  EG/BUS: Estrogen loss atrophy present. Normal genital architecture without lesions, erythema or abnormal secretions Bartholin's, Urethra, [place]'s normal   Urethral meatus: normal   Urethra: no masses, tenderness, or scarring   Bladder: no masses or tenderness   Vagina: moist, pink, rugae with creamy, white, and odorless  secretions  Cervix: no lesions and pink, moist, closed, without lesion or CMT  Rectum:anus normal    ASSESSMENT/Plan:  Doris Fritz is an 53 year old, No obstetric history on file., who requests an evaluation of menopause symptoms and annual preventative health exam.    ICD-10-CM    1. Visit for preventive health examination  Z00.00 Colonoscopy Screening  Referral     estradiol (VIVELLE-DOT) 0.025 MG/24HR bi-weekly patch     progesterone (PROMETRIUM) 100 MG capsule     Hemoglobin A1c     Glucose     Lipid Profile     TSH with free T4 reflex      2. Family history of infection due to human papilloma virus (HPV)  Z83.1       3. History of depression  Z86.59       4. Personal history of urinary tract infection  Z87.440       5. Screening for malignant neoplasm of cervix  Z12.4 Pap Smear Exam [] Do Not Remove     Pelvic and Breast Exam Procedure []     Pap imaged thin layer screen with HPV - recommended age 30 - 65 years (select HPV order below)     HPV Hold (Lab Only)     HPV High Risk Types DNA Cervical      6. Encounter for gynecological examination without abnormal finding  Z01.419 Pap Smear Exam [] Do Not Remove     Pelvic and Breast Exam Procedure []     Pap imaged thin layer screen with HPV - recommended age 30 - 65  years (select HPV order below)     HPV Hold (Lab Only)     HPV High Risk Types DNA Cervical      7. Symptomatic menopausal or female climacteric states  N95.1 estradiol (VIVELLE-DOT) 0.025 MG/24HR bi-weekly patch     progesterone (PROMETRIUM) 100 MG capsule          PLAN:  Orders Placed This Encounter   Procedures    Pelvic and Breast Exam Procedure []    Pap Smear Exam [] Do Not Remove    Hemoglobin A1c    Glucose    Lipid Profile    TSH with free T4 reflex    HPV High Risk Types DNA Cervical    Colonoscopy Screening  Referral    Pap imaged thin layer screen with HPV - recommended age 30 - 65 years (select HPV order below)    HPV Hold (Lab Only)       -Reviewed risk and benefits of initiating systemic HT for bothersome VMS. -Discussed risk versus benefit with patient in initiating systemic HT  Doris Fritz is an 53 year old with No LMP recorded (lmp unknown). Patient is postmenopausal..x 6 yrs  -Patient does not have any absolute contraindications and requests to initiate treatment today using transdermal estradiol patch including nightly oral progesterone. Discussed medication regimen with patient continuous progesterone.     -Acupuncture, yoga, exercise, chiropractic work, and paced respirations may help to cope with VMS but have not shown evidence to alleviate them.   -Discussed ways to decrease vulvar irritation including wearing 100% cotton underwear, avoiding soaps or scented products, pat-drying and utilizing preservative-free emollients  -Reviewed use of vaginal moisterizer for GSM.      Return to clinic in prn.  Follow-up as needed.  BRENTON Macdonald CNCECILLE        Answers submitted by the patient for this visit:  TJ-7 (Submitted on 1/24/2024)  TJ 7 TOTAL SCORE: 0

## 2024-01-29 ENCOUNTER — THERAPY VISIT (OUTPATIENT)
Dept: PHYSICAL THERAPY | Facility: CLINIC | Age: 54
End: 2024-01-29
Attending: OBSTETRICS & GYNECOLOGY
Payer: COMMERCIAL

## 2024-01-29 DIAGNOSIS — R10.2 PELVIC PAIN IN FEMALE: Primary | ICD-10-CM

## 2024-01-29 PROCEDURE — 97530 THERAPEUTIC ACTIVITIES: CPT | Mod: GP | Performed by: PHYSICAL THERAPIST

## 2024-01-29 PROCEDURE — 97112 NEUROMUSCULAR REEDUCATION: CPT | Mod: 59 | Performed by: PHYSICAL THERAPIST

## 2024-01-30 LAB
BKR LAB AP GYN ADEQUACY: NORMAL
BKR LAB AP GYN INTERPRETATION: NORMAL
BKR LAB AP HPV REFLEX: NORMAL
BKR LAB AP PREVIOUS ABNORMAL: NORMAL
PATH REPORT.COMMENTS IMP SPEC: NORMAL
PATH REPORT.COMMENTS IMP SPEC: NORMAL
PATH REPORT.RELEVANT HX SPEC: NORMAL

## 2024-02-01 ENCOUNTER — LAB (OUTPATIENT)
Dept: LAB | Facility: CLINIC | Age: 54
End: 2024-02-01
Attending: ADVANCED PRACTICE MIDWIFE
Payer: COMMERCIAL

## 2024-02-01 ENCOUNTER — ANCILLARY PROCEDURE (OUTPATIENT)
Dept: MAMMOGRAPHY | Facility: CLINIC | Age: 54
End: 2024-02-01
Attending: ADVANCED PRACTICE MIDWIFE
Payer: COMMERCIAL

## 2024-02-01 DIAGNOSIS — Z12.31 VISIT FOR SCREENING MAMMOGRAM: ICD-10-CM

## 2024-02-01 DIAGNOSIS — Z00.00 VISIT FOR PREVENTIVE HEALTH EXAMINATION: ICD-10-CM

## 2024-02-01 LAB
CHOLEST SERPL-MCNC: 187 MG/DL
FASTING STATUS PATIENT QL REPORTED: YES
FASTING STATUS PATIENT QL REPORTED: YES
GLUCOSE SERPL-MCNC: 110 MG/DL (ref 70–99)
HBA1C MFR BLD: 5.5 %
HDLC SERPL-MCNC: 67 MG/DL
HUMAN PAPILLOMA VIRUS 16 DNA: NEGATIVE
HUMAN PAPILLOMA VIRUS 18 DNA: NEGATIVE
HUMAN PAPILLOMA VIRUS FINAL DIAGNOSIS: NORMAL
HUMAN PAPILLOMA VIRUS OTHER HR: NEGATIVE
LDLC SERPL CALC-MCNC: 110 MG/DL
NONHDLC SERPL-MCNC: 120 MG/DL
TRIGL SERPL-MCNC: 50 MG/DL
TSH SERPL DL<=0.005 MIU/L-ACNC: 1.13 UIU/ML (ref 0.3–4.2)

## 2024-02-01 PROCEDURE — 83036 HEMOGLOBIN GLYCOSYLATED A1C: CPT

## 2024-02-01 PROCEDURE — 82947 ASSAY GLUCOSE BLOOD QUANT: CPT

## 2024-02-01 PROCEDURE — 77067 SCR MAMMO BI INCL CAD: CPT

## 2024-02-01 PROCEDURE — 36415 COLL VENOUS BLD VENIPUNCTURE: CPT

## 2024-02-01 PROCEDURE — 80061 LIPID PANEL: CPT

## 2024-02-01 PROCEDURE — 84443 ASSAY THYROID STIM HORMONE: CPT

## 2024-02-01 PROCEDURE — 77067 SCR MAMMO BI INCL CAD: CPT | Mod: 26 | Performed by: RADIOLOGY

## 2024-02-06 ENCOUNTER — MYC MEDICAL ADVICE (OUTPATIENT)
Dept: OBGYN | Facility: CLINIC | Age: 54
End: 2024-02-06

## 2024-02-06 DIAGNOSIS — N95.1 SYMPTOMATIC MENOPAUSAL OR FEMALE CLIMACTERIC STATES: Primary | ICD-10-CM

## 2024-02-07 ENCOUNTER — THERAPY VISIT (OUTPATIENT)
Dept: PHYSICAL THERAPY | Facility: CLINIC | Age: 54
End: 2024-02-07
Payer: COMMERCIAL

## 2024-02-07 DIAGNOSIS — R10.2 PELVIC PAIN IN FEMALE: Primary | ICD-10-CM

## 2024-02-07 PROCEDURE — 97110 THERAPEUTIC EXERCISES: CPT | Mod: GP | Performed by: PHYSICAL THERAPIST

## 2024-02-07 PROCEDURE — 97140 MANUAL THERAPY 1/> REGIONS: CPT | Mod: GP | Performed by: PHYSICAL THERAPIST

## 2024-02-09 RX ORDER — ESTRADIOL 10 UG/1
10 INSERT VAGINAL
Qty: 24 TABLET | Refills: 3 | Status: SHIPPED | OUTPATIENT
Start: 2024-02-12

## 2024-02-27 ENCOUNTER — THERAPY VISIT (OUTPATIENT)
Dept: PHYSICAL THERAPY | Facility: CLINIC | Age: 54
End: 2024-02-27
Payer: COMMERCIAL

## 2024-02-27 DIAGNOSIS — R10.2 PELVIC PAIN IN FEMALE: Primary | ICD-10-CM

## 2024-02-27 PROCEDURE — 97110 THERAPEUTIC EXERCISES: CPT | Mod: GP | Performed by: PHYSICAL THERAPIST

## 2024-02-27 PROCEDURE — 97140 MANUAL THERAPY 1/> REGIONS: CPT | Mod: GP | Performed by: PHYSICAL THERAPIST

## 2025-03-08 ENCOUNTER — HEALTH MAINTENANCE LETTER (OUTPATIENT)
Age: 55
End: 2025-03-08